# Patient Record
Sex: FEMALE | Race: WHITE | NOT HISPANIC OR LATINO | Employment: FULL TIME | URBAN - METROPOLITAN AREA
[De-identification: names, ages, dates, MRNs, and addresses within clinical notes are randomized per-mention and may not be internally consistent; named-entity substitution may affect disease eponyms.]

---

## 2017-05-18 ENCOUNTER — COMMUNICATION - HEALTHEAST (OUTPATIENT)
Dept: FAMILY MEDICINE | Facility: CLINIC | Age: 42
End: 2017-05-18

## 2018-05-25 ENCOUNTER — OFFICE VISIT - HEALTHEAST (OUTPATIENT)
Dept: FAMILY MEDICINE | Facility: CLINIC | Age: 43
End: 2018-05-25

## 2018-05-25 DIAGNOSIS — R21 RASH: ICD-10-CM

## 2018-05-25 DIAGNOSIS — R05.9 COUGH: ICD-10-CM

## 2018-05-25 DIAGNOSIS — J02.9 SORE THROAT: ICD-10-CM

## 2018-05-25 LAB — DEPRECATED S PYO AG THROAT QL EIA: NORMAL

## 2018-05-26 LAB — GROUP A STREP BY PCR: NORMAL

## 2018-08-14 ENCOUNTER — OFFICE VISIT - HEALTHEAST (OUTPATIENT)
Dept: SURGERY | Facility: CLINIC | Age: 43
End: 2018-08-14

## 2018-08-14 DIAGNOSIS — Z71.3 NUTRITIONAL COUNSELING: ICD-10-CM

## 2018-08-14 DIAGNOSIS — D25.2 FIBROIDS, SUBSEROUS: ICD-10-CM

## 2018-08-14 DIAGNOSIS — E66.9 OBESITY WITH BODY MASS INDEX OF 30.0-39.9: ICD-10-CM

## 2018-08-14 ASSESSMENT — MIFFLIN-ST. JEOR: SCORE: 1380.7

## 2018-10-16 ENCOUNTER — OFFICE VISIT - HEALTHEAST (OUTPATIENT)
Dept: FAMILY MEDICINE | Facility: CLINIC | Age: 43
End: 2018-10-16

## 2018-10-16 DIAGNOSIS — J02.9 VIRAL PHARYNGITIS: ICD-10-CM

## 2018-10-16 DIAGNOSIS — J02.9 SORE THROAT: ICD-10-CM

## 2018-10-16 DIAGNOSIS — B00.9 HERPES SIMPLEX VIRUS (HSV) INFECTION: ICD-10-CM

## 2018-10-16 LAB — DEPRECATED S PYO AG THROAT QL EIA: NORMAL

## 2018-10-17 LAB — GROUP A STREP BY PCR: NORMAL

## 2018-11-27 ENCOUNTER — OFFICE VISIT - HEALTHEAST (OUTPATIENT)
Dept: FAMILY MEDICINE | Facility: CLINIC | Age: 43
End: 2018-11-27

## 2018-11-27 ENCOUNTER — RECORDS - HEALTHEAST (OUTPATIENT)
Dept: GENERAL RADIOLOGY | Facility: CLINIC | Age: 43
End: 2018-11-27

## 2018-11-27 DIAGNOSIS — M79.641 PAIN OF RIGHT HAND: ICD-10-CM

## 2018-11-27 DIAGNOSIS — M79.641 PAIN IN RIGHT HAND: ICD-10-CM

## 2018-12-13 ENCOUNTER — OFFICE VISIT - HEALTHEAST (OUTPATIENT)
Dept: SURGERY | Facility: CLINIC | Age: 43
End: 2018-12-13

## 2018-12-13 DIAGNOSIS — E66.3 OVERWEIGHT (BMI 25.0-29.9): ICD-10-CM

## 2018-12-13 DIAGNOSIS — D25.2 FIBROIDS, SUBSEROUS: ICD-10-CM

## 2018-12-13 DIAGNOSIS — Z71.3 NUTRITIONAL COUNSELING: ICD-10-CM

## 2018-12-13 ASSESSMENT — MIFFLIN-ST. JEOR: SCORE: 1329.9

## 2019-02-25 ENCOUNTER — COMMUNICATION - HEALTHEAST (OUTPATIENT)
Dept: SCHEDULING | Facility: CLINIC | Age: 44
End: 2019-02-25

## 2019-04-01 ENCOUNTER — AMBULATORY - HEALTHEAST (OUTPATIENT)
Dept: SURGERY | Facility: CLINIC | Age: 44
End: 2019-04-01

## 2019-04-01 DIAGNOSIS — E66.9 OBESITY: ICD-10-CM

## 2019-04-01 DIAGNOSIS — R63.2 HYPERPHAGIA: ICD-10-CM

## 2019-04-04 ENCOUNTER — AMBULATORY - HEALTHEAST (OUTPATIENT)
Dept: SURGERY | Facility: CLINIC | Age: 44
End: 2019-04-04

## 2019-04-04 DIAGNOSIS — R63.2 HYPERPHAGIA: ICD-10-CM

## 2019-04-04 DIAGNOSIS — E66.9 OBESITY: ICD-10-CM

## 2019-04-04 DIAGNOSIS — B00.1 RECURRENT COLD SORES: ICD-10-CM

## 2019-05-17 ENCOUNTER — OFFICE VISIT - HEALTHEAST (OUTPATIENT)
Dept: FAMILY MEDICINE | Facility: CLINIC | Age: 44
End: 2019-05-17

## 2019-05-17 ENCOUNTER — COMMUNICATION - HEALTHEAST (OUTPATIENT)
Dept: FAMILY MEDICINE | Facility: CLINIC | Age: 44
End: 2019-05-17

## 2019-05-17 DIAGNOSIS — S99.912A ANKLE INJURY, LEFT, INITIAL ENCOUNTER: ICD-10-CM

## 2019-06-11 ENCOUNTER — AMBULATORY - HEALTHEAST (OUTPATIENT)
Dept: SURGERY | Facility: CLINIC | Age: 44
End: 2019-06-11

## 2019-06-11 ENCOUNTER — COMMUNICATION - HEALTHEAST (OUTPATIENT)
Dept: FAMILY MEDICINE | Facility: CLINIC | Age: 44
End: 2019-06-11

## 2019-06-11 DIAGNOSIS — G43.909 MIGRAINE: ICD-10-CM

## 2019-06-11 DIAGNOSIS — M54.2 NECK PAIN: ICD-10-CM

## 2019-06-11 DIAGNOSIS — B00.9 HERPES SIMPLEX VIRUS (HSV) INFECTION: ICD-10-CM

## 2019-06-26 ENCOUNTER — OFFICE VISIT - HEALTHEAST (OUTPATIENT)
Dept: FAMILY MEDICINE | Facility: CLINIC | Age: 44
End: 2019-06-26

## 2019-06-26 DIAGNOSIS — S99.912D INJURY OF LEFT ANKLE, SUBSEQUENT ENCOUNTER: ICD-10-CM

## 2019-06-26 ASSESSMENT — MIFFLIN-ST. JEOR: SCORE: 1313.57

## 2019-07-10 ENCOUNTER — RECORDS - HEALTHEAST (OUTPATIENT)
Dept: ADMINISTRATIVE | Facility: OTHER | Age: 44
End: 2019-07-10

## 2019-07-14 ENCOUNTER — OFFICE VISIT - HEALTHEAST (OUTPATIENT)
Dept: FAMILY MEDICINE | Facility: CLINIC | Age: 44
End: 2019-07-14

## 2019-07-14 DIAGNOSIS — R50.9 FEVER AND CHILLS: ICD-10-CM

## 2019-07-14 DIAGNOSIS — R52 BODY ACHES: ICD-10-CM

## 2019-07-14 DIAGNOSIS — R07.89 CHEST TIGHTNESS: ICD-10-CM

## 2019-07-14 DIAGNOSIS — J18.9 PNEUMONIA OF LEFT UPPER LOBE DUE TO INFECTIOUS ORGANISM: ICD-10-CM

## 2019-07-14 DIAGNOSIS — R05.9 COUGH: ICD-10-CM

## 2019-07-14 LAB
ALBUMIN SERPL-MCNC: 4.1 G/DL (ref 3.5–5)
ALBUMIN UR-MCNC: ABNORMAL MG/DL
ALP SERPL-CCNC: 64 U/L (ref 45–120)
ALT SERPL W P-5'-P-CCNC: 17 U/L (ref 0–45)
ANION GAP SERPL CALCULATED.3IONS-SCNC: 13 MMOL/L (ref 5–18)
APPEARANCE UR: CLEAR
AST SERPL W P-5'-P-CCNC: 19 U/L (ref 0–40)
BACTERIA #/AREA URNS HPF: ABNORMAL HPF
BASOPHILS # BLD AUTO: 0 THOU/UL (ref 0–0.2)
BASOPHILS NFR BLD AUTO: 1 % (ref 0–2)
BILIRUB SERPL-MCNC: 0.2 MG/DL (ref 0–1)
BILIRUB UR QL STRIP: NEGATIVE
BUN SERPL-MCNC: 8 MG/DL (ref 8–22)
C REACTIVE PROTEIN LHE: 7.2 MG/DL (ref 0–0.8)
CALCIUM SERPL-MCNC: 9.8 MG/DL (ref 8.5–10.5)
CHLORIDE BLD-SCNC: 106 MMOL/L (ref 98–107)
CO2 SERPL-SCNC: 22 MMOL/L (ref 22–31)
COLOR UR AUTO: YELLOW
CREAT SERPL-MCNC: 0.87 MG/DL (ref 0.6–1.1)
D DIMER PPP FEU-MCNC: 0.32 FEU UG/ML
EOSINOPHIL # BLD AUTO: 0.2 THOU/UL (ref 0–0.4)
EOSINOPHIL NFR BLD AUTO: 2 % (ref 0–6)
ERYTHROCYTE [DISTWIDTH] IN BLOOD BY AUTOMATED COUNT: 12.8 % (ref 11–14.5)
ERYTHROCYTE [SEDIMENTATION RATE] IN BLOOD BY WESTERGREN METHOD: 18 MM/HR (ref 0–20)
FLUAV AG SPEC QL IA: NORMAL
FLUBV AG SPEC QL IA: NORMAL
GFR SERPL CREATININE-BSD FRML MDRD: >60 ML/MIN/1.73M2
GLUCOSE BLD-MCNC: 90 MG/DL (ref 70–125)
GLUCOSE UR STRIP-MCNC: NEGATIVE MG/DL
HCT VFR BLD AUTO: 40.4 % (ref 35–47)
HGB BLD-MCNC: 13.6 G/DL (ref 12–16)
HGB UR QL STRIP: ABNORMAL
KETONES UR STRIP-MCNC: NEGATIVE MG/DL
LEUKOCYTE ESTERASE UR QL STRIP: NEGATIVE
LYMPHOCYTES # BLD AUTO: 1.6 THOU/UL (ref 0.8–4.4)
LYMPHOCYTES NFR BLD AUTO: 18 % (ref 20–40)
MCH RBC QN AUTO: 29.5 PG (ref 27–34)
MCHC RBC AUTO-ENTMCNC: 33.6 G/DL (ref 32–36)
MCV RBC AUTO: 88 FL (ref 80–100)
MONOCYTES # BLD AUTO: 0.8 THOU/UL (ref 0–0.9)
MONOCYTES NFR BLD AUTO: 9 % (ref 2–10)
MONOCYTES NFR BLD AUTO: NEGATIVE %
NEUTROPHILS # BLD AUTO: 6.3 THOU/UL (ref 2–7.7)
NEUTROPHILS NFR BLD AUTO: 71 % (ref 50–70)
NITRATE UR QL: NEGATIVE
PH UR STRIP: 5.5 [PH] (ref 5–8)
PLATELET # BLD AUTO: 285 THOU/UL (ref 140–440)
PMV BLD AUTO: 6.7 FL (ref 7–10)
POTASSIUM BLD-SCNC: 4.3 MMOL/L (ref 3.5–5)
PROT SERPL-MCNC: 7.2 G/DL (ref 6–8)
RBC # BLD AUTO: 4.6 MILL/UL (ref 3.8–5.4)
RBC #/AREA URNS AUTO: ABNORMAL HPF
SODIUM SERPL-SCNC: 141 MMOL/L (ref 136–145)
SP GR UR STRIP: 1.02 (ref 1–1.03)
SQUAMOUS #/AREA URNS AUTO: ABNORMAL LPF
UROBILINOGEN UR STRIP-ACNC: ABNORMAL
WBC #/AREA URNS AUTO: ABNORMAL HPF
WBC: 8.9 THOU/UL (ref 4–11)

## 2019-07-15 ENCOUNTER — COMMUNICATION - HEALTHEAST (OUTPATIENT)
Dept: FAMILY MEDICINE | Facility: CLINIC | Age: 44
End: 2019-07-15

## 2019-07-16 LAB — B BURGDOR IGG+IGM SER QL: 0.04 INDEX VALUE

## 2019-07-29 ENCOUNTER — RECORDS - HEALTHEAST (OUTPATIENT)
Dept: ADMINISTRATIVE | Facility: OTHER | Age: 44
End: 2019-07-29

## 2019-08-23 ENCOUNTER — OFFICE VISIT - HEALTHEAST (OUTPATIENT)
Dept: FAMILY MEDICINE | Facility: CLINIC | Age: 44
End: 2019-08-23

## 2019-08-23 DIAGNOSIS — L30.1 DYSHIDROTIC ECZEMA: ICD-10-CM

## 2019-08-23 DIAGNOSIS — B00.9 HERPES SIMPLEX VIRUS (HSV) INFECTION: ICD-10-CM

## 2019-08-23 DIAGNOSIS — T63.441D ANAPHYLACTIC REACTION TO BEE STING, ACCIDENTAL OR UNINTENTIONAL, SUBSEQUENT ENCOUNTER: ICD-10-CM

## 2019-08-23 DIAGNOSIS — Z00.00 ROUTINE GENERAL MEDICAL EXAMINATION AT A HEALTH CARE FACILITY: ICD-10-CM

## 2019-08-23 DIAGNOSIS — T78.2XXD ANAPHYLACTIC REACTION TO BEE STING, ACCIDENTAL OR UNINTENTIONAL, SUBSEQUENT ENCOUNTER: ICD-10-CM

## 2019-08-23 DIAGNOSIS — M54.2 NECK PAIN: ICD-10-CM

## 2019-08-23 DIAGNOSIS — G47.00 INSOMNIA, UNSPECIFIED TYPE: ICD-10-CM

## 2019-08-23 LAB
CHOLEST SERPL-MCNC: 173 MG/DL
FASTING STATUS PATIENT QL REPORTED: NO
HDLC SERPL-MCNC: 65 MG/DL
LDLC SERPL CALC-MCNC: 79 MG/DL
TRIGL SERPL-MCNC: 145 MG/DL

## 2019-08-23 ASSESSMENT — MIFFLIN-ST. JEOR: SCORE: 1308.12

## 2019-09-04 ENCOUNTER — RECORDS - HEALTHEAST (OUTPATIENT)
Dept: ADMINISTRATIVE | Facility: OTHER | Age: 44
End: 2019-09-04

## 2019-10-28 ENCOUNTER — RECORDS - HEALTHEAST (OUTPATIENT)
Dept: ADMINISTRATIVE | Facility: OTHER | Age: 44
End: 2019-10-28

## 2019-12-06 ENCOUNTER — RECORDS - HEALTHEAST (OUTPATIENT)
Dept: ADMINISTRATIVE | Facility: OTHER | Age: 44
End: 2019-12-06

## 2019-12-20 ENCOUNTER — RECORDS - HEALTHEAST (OUTPATIENT)
Dept: ADMINISTRATIVE | Facility: OTHER | Age: 44
End: 2019-12-20

## 2019-12-21 ENCOUNTER — AMBULATORY - HEALTHEAST (OUTPATIENT)
Dept: SURGERY | Facility: CLINIC | Age: 44
End: 2019-12-21

## 2019-12-21 DIAGNOSIS — E66.9 OBESITY: ICD-10-CM

## 2019-12-21 DIAGNOSIS — R63.2 HYPERPHAGIA: ICD-10-CM

## 2019-12-31 ENCOUNTER — OFFICE VISIT - HEALTHEAST (OUTPATIENT)
Dept: SURGERY | Facility: CLINIC | Age: 44
End: 2019-12-31

## 2019-12-31 DIAGNOSIS — E66.3 OVERWEIGHT (BMI 25.0-29.9): ICD-10-CM

## 2019-12-31 DIAGNOSIS — Z71.3 NUTRITIONAL COUNSELING: ICD-10-CM

## 2019-12-31 DIAGNOSIS — D25.2 FIBROIDS, SUBSEROUS: ICD-10-CM

## 2019-12-31 ASSESSMENT — MIFFLIN-ST. JEOR: SCORE: 1354.39

## 2020-01-27 ENCOUNTER — RECORDS - HEALTHEAST (OUTPATIENT)
Dept: ADMINISTRATIVE | Facility: OTHER | Age: 45
End: 2020-01-27

## 2020-02-03 ENCOUNTER — TRANSFERRED RECORDS (OUTPATIENT)
Dept: HEALTH INFORMATION MANAGEMENT | Facility: CLINIC | Age: 45
End: 2020-02-03

## 2020-02-10 ENCOUNTER — RECORDS - HEALTHEAST (OUTPATIENT)
Dept: ADMINISTRATIVE | Facility: OTHER | Age: 45
End: 2020-02-10

## 2020-05-04 ENCOUNTER — AMBULATORY - HEALTHEAST (OUTPATIENT)
Dept: SURGERY | Facility: CLINIC | Age: 45
End: 2020-05-04

## 2020-05-04 DIAGNOSIS — K12.0 RECURRENT ORAL APHTHAE: ICD-10-CM

## 2020-05-04 DIAGNOSIS — M54.2 NECK PAIN: ICD-10-CM

## 2020-10-13 ENCOUNTER — AMBULATORY - HEALTHEAST (OUTPATIENT)
Dept: NURSING | Facility: CLINIC | Age: 45
End: 2020-10-13

## 2020-11-06 ENCOUNTER — TRANSFERRED RECORDS (OUTPATIENT)
Dept: HEALTH INFORMATION MANAGEMENT | Facility: CLINIC | Age: 45
End: 2020-11-06

## 2020-11-22 ENCOUNTER — AMBULATORY - HEALTHEAST (OUTPATIENT)
Dept: SURGERY | Facility: CLINIC | Age: 45
End: 2020-11-22

## 2020-11-22 DIAGNOSIS — R63.2 HYPERPHAGIA: ICD-10-CM

## 2020-11-22 DIAGNOSIS — E66.9 OBESITY: ICD-10-CM

## 2021-01-29 ENCOUNTER — OFFICE VISIT - HEALTHEAST (OUTPATIENT)
Dept: FAMILY MEDICINE | Facility: CLINIC | Age: 46
End: 2021-01-29

## 2021-01-29 DIAGNOSIS — Z12.11 SCREENING FOR COLON CANCER: ICD-10-CM

## 2021-01-29 DIAGNOSIS — Z12.31 VISIT FOR SCREENING MAMMOGRAM: ICD-10-CM

## 2021-01-29 DIAGNOSIS — M25.572 PAIN IN JOINT, ANKLE AND FOOT, LEFT: ICD-10-CM

## 2021-01-29 DIAGNOSIS — Z00.00 ROUTINE GENERAL MEDICAL EXAMINATION AT A HEALTH CARE FACILITY: ICD-10-CM

## 2021-01-29 DIAGNOSIS — Z80.0 FAMILY HISTORY OF COLON CANCER: ICD-10-CM

## 2021-01-29 DIAGNOSIS — Z86.19 HISTORY OF COLD SORES: ICD-10-CM

## 2021-01-29 DIAGNOSIS — M54.2 NECK PAIN: ICD-10-CM

## 2021-01-29 LAB
CHOLEST SERPL-MCNC: 191 MG/DL
FASTING STATUS PATIENT QL REPORTED: NORMAL
FASTING STATUS PATIENT QL REPORTED: NORMAL
GLUCOSE BLD-MCNC: 84 MG/DL (ref 74–125)
HDLC SERPL-MCNC: 61 MG/DL
HGB BLD-MCNC: 14.4 G/DL (ref 12–16)
LDLC SERPL CALC-MCNC: 110 MG/DL
TRIGL SERPL-MCNC: 98 MG/DL

## 2021-01-29 ASSESSMENT — MIFFLIN-ST. JEOR: SCORE: 1307.44

## 2021-02-02 ENCOUNTER — OFFICE VISIT - HEALTHEAST (OUTPATIENT)
Dept: FAMILY MEDICINE | Facility: CLINIC | Age: 46
End: 2021-02-02

## 2021-02-02 ENCOUNTER — COMMUNICATION - HEALTHEAST (OUTPATIENT)
Dept: SCHEDULING | Facility: CLINIC | Age: 46
End: 2021-02-02

## 2021-02-02 DIAGNOSIS — M54.12 RIGHT CERVICAL RADICULOPATHY: ICD-10-CM

## 2021-02-02 ASSESSMENT — MIFFLIN-ST. JEOR: SCORE: 1312.21

## 2021-02-03 ENCOUNTER — COMMUNICATION - HEALTHEAST (OUTPATIENT)
Dept: PHYSICAL MEDICINE AND REHAB | Facility: CLINIC | Age: 46
End: 2021-02-03

## 2021-02-03 ENCOUNTER — OFFICE VISIT - HEALTHEAST (OUTPATIENT)
Dept: NEUROSURGERY | Facility: CLINIC | Age: 46
End: 2021-02-03

## 2021-02-03 ENCOUNTER — COMMUNICATION - HEALTHEAST (OUTPATIENT)
Dept: SCHEDULING | Facility: CLINIC | Age: 46
End: 2021-02-03

## 2021-02-03 ENCOUNTER — HOSPITAL ENCOUNTER (OUTPATIENT)
Dept: PHYSICAL MEDICINE AND REHAB | Facility: CLINIC | Age: 46
Discharge: HOME | End: 2021-02-03
Attending: EMERGENCY MEDICINE

## 2021-02-03 DIAGNOSIS — M54.12 CERVICAL RADICULITIS: ICD-10-CM

## 2021-02-03 DIAGNOSIS — R52 SEVERE PAIN: ICD-10-CM

## 2021-02-03 DIAGNOSIS — M50.20 CERVICAL DISC HERNIATION: ICD-10-CM

## 2021-02-03 DIAGNOSIS — M54.12 CERVICAL RADICULOPATHY: ICD-10-CM

## 2021-02-03 ASSESSMENT — MIFFLIN-ST. JEOR: SCORE: 1312.21

## 2021-02-04 ENCOUNTER — COMMUNICATION - HEALTHEAST (OUTPATIENT)
Dept: PHYSICAL MEDICINE AND REHAB | Facility: CLINIC | Age: 46
End: 2021-02-04

## 2021-02-04 ENCOUNTER — HOSPITAL ENCOUNTER (OUTPATIENT)
Dept: PHYSICAL MEDICINE AND REHAB | Facility: CLINIC | Age: 46
Discharge: HOME | End: 2021-02-04
Attending: PAIN MEDICINE

## 2021-02-04 DIAGNOSIS — R52 SEVERE PAIN: ICD-10-CM

## 2021-02-04 DIAGNOSIS — M54.12 CERVICAL RADICULITIS: ICD-10-CM

## 2021-02-04 DIAGNOSIS — M50.20 CERVICAL DISC HERNIATION: ICD-10-CM

## 2021-02-06 ENCOUNTER — COMMUNICATION - HEALTHEAST (OUTPATIENT)
Dept: NEUROLOGY | Facility: CLINIC | Age: 46
End: 2021-02-06

## 2021-02-06 DIAGNOSIS — M54.12 CERVICAL RADICULOPATHY: ICD-10-CM

## 2021-02-08 ENCOUNTER — COMMUNICATION - HEALTHEAST (OUTPATIENT)
Dept: NEUROSURGERY | Facility: CLINIC | Age: 46
End: 2021-02-08

## 2021-02-10 ENCOUNTER — OFFICE VISIT - HEALTHEAST (OUTPATIENT)
Dept: NEUROSURGERY | Facility: CLINIC | Age: 46
End: 2021-02-10

## 2021-02-10 ENCOUNTER — HOSPITAL ENCOUNTER (OUTPATIENT)
Dept: RADIOLOGY | Facility: HOSPITAL | Age: 46
Discharge: HOME | End: 2021-02-10
Attending: SURGERY

## 2021-02-10 ENCOUNTER — RECORDS - HEALTHEAST (OUTPATIENT)
Dept: ADMINISTRATIVE | Facility: OTHER | Age: 46
End: 2021-02-10

## 2021-02-10 ENCOUNTER — SURGERY - HEALTHEAST (OUTPATIENT)
Dept: NEUROSURGERY | Facility: CLINIC | Age: 46
End: 2021-02-10

## 2021-02-10 ENCOUNTER — AMBULATORY - HEALTHEAST (OUTPATIENT)
Dept: SURGERY | Facility: HOSPITAL | Age: 46
End: 2021-02-10

## 2021-02-10 ENCOUNTER — AMBULATORY - HEALTHEAST (OUTPATIENT)
Dept: LAB | Facility: HOSPITAL | Age: 46
End: 2021-02-10

## 2021-02-10 DIAGNOSIS — Z11.59 ENCOUNTER FOR SCREENING FOR OTHER VIRAL DISEASES: ICD-10-CM

## 2021-02-10 DIAGNOSIS — M54.12 CERVICAL RADICULOPATHY: ICD-10-CM

## 2021-02-10 LAB
ANION GAP SERPL CALCULATED.3IONS-SCNC: 9 MMOL/L (ref 5–18)
APTT PPP: 30 SECONDS (ref 24–37)
BUN SERPL-MCNC: 13 MG/DL (ref 8–22)
CALCIUM SERPL-MCNC: 9.1 MG/DL (ref 8.5–10.5)
CHLORIDE BLD-SCNC: 107 MMOL/L (ref 98–107)
CLOSURE TME COLL+EPINEP BLD: 139 SEC (ref 1–180)
CO2 SERPL-SCNC: 22 MMOL/L (ref 22–31)
CREAT SERPL-MCNC: 0.74 MG/DL (ref 0.6–1.1)
ERYTHROCYTE [DISTWIDTH] IN BLOOD BY AUTOMATED COUNT: 12.8 % (ref 11–14.5)
GFR SERPL CREATININE-BSD FRML MDRD: >60 ML/MIN/1.73M2
GLUCOSE BLD-MCNC: 96 MG/DL (ref 70–125)
HCT VFR BLD AUTO: 39.9 % (ref 35–47)
HGB BLD-MCNC: 13.2 G/DL (ref 12–16)
INR PPP: 0.85 (ref 0.9–1.1)
MCH RBC QN AUTO: 30.1 PG (ref 27–34)
MCHC RBC AUTO-ENTMCNC: 33.1 G/DL (ref 32–36)
MCV RBC AUTO: 91 FL (ref 80–100)
PLATELET # BLD AUTO: 330 THOU/UL (ref 140–440)
PMV BLD AUTO: 8.5 FL (ref 8.5–12.5)
POTASSIUM BLD-SCNC: 4 MMOL/L (ref 3.5–5)
RBC # BLD AUTO: 4.38 MILL/UL (ref 3.8–5.4)
SODIUM SERPL-SCNC: 138 MMOL/L (ref 136–145)
WBC: 10.2 THOU/UL (ref 4–11)

## 2021-02-10 ASSESSMENT — MIFFLIN-ST. JEOR: SCORE: 1312.21

## 2021-02-11 ENCOUNTER — COMMUNICATION - HEALTHEAST (OUTPATIENT)
Dept: NEUROSURGERY | Facility: CLINIC | Age: 46
End: 2021-02-11

## 2021-02-11 ENCOUNTER — AMBULATORY - HEALTHEAST (OUTPATIENT)
Dept: LAB | Facility: CLINIC | Age: 46
End: 2021-02-11

## 2021-02-11 ENCOUNTER — OFFICE VISIT - HEALTHEAST (OUTPATIENT)
Dept: FAMILY MEDICINE | Facility: CLINIC | Age: 46
End: 2021-02-11

## 2021-02-11 DIAGNOSIS — Z11.59 ENCOUNTER FOR SCREENING FOR OTHER VIRAL DISEASES: ICD-10-CM

## 2021-02-11 DIAGNOSIS — Z01.818 PREOP GENERAL PHYSICAL EXAM: ICD-10-CM

## 2021-02-11 DIAGNOSIS — M54.12 CERVICAL RADICULOPATHY: ICD-10-CM

## 2021-02-11 DIAGNOSIS — E04.1 THYROID CYST: ICD-10-CM

## 2021-02-11 ASSESSMENT — MIFFLIN-ST. JEOR: SCORE: 1312.21

## 2021-02-12 LAB
SARS-COV-2 PCR COMMENT: NORMAL
SARS-COV-2 RNA SPEC QL NAA+PROBE: NEGATIVE
SARS-COV-2 VIRUS SPECIMEN SOURCE: NORMAL

## 2021-02-13 ENCOUNTER — COMMUNICATION - HEALTHEAST (OUTPATIENT)
Dept: SCHEDULING | Facility: CLINIC | Age: 46
End: 2021-02-13

## 2021-02-15 ENCOUNTER — ANESTHESIA - HEALTHEAST (OUTPATIENT)
Dept: SURGERY | Facility: HOSPITAL | Age: 46
End: 2021-02-15

## 2021-02-15 ENCOUNTER — SURGERY - HEALTHEAST (OUTPATIENT)
Dept: SURGERY | Facility: HOSPITAL | Age: 46
End: 2021-02-15

## 2021-02-15 ASSESSMENT — MIFFLIN-ST. JEOR
SCORE: 1312.21
SCORE: 1362.1

## 2021-02-16 ENCOUNTER — COMMUNICATION - HEALTHEAST (OUTPATIENT)
Dept: NEUROSURGERY | Facility: CLINIC | Age: 46
End: 2021-02-16

## 2021-02-17 ENCOUNTER — COMMUNICATION - HEALTHEAST (OUTPATIENT)
Dept: NEUROSURGERY | Facility: CLINIC | Age: 46
End: 2021-02-17

## 2021-02-17 DIAGNOSIS — Z98.1 S/P CERVICAL SPINAL FUSION: ICD-10-CM

## 2021-02-22 ENCOUNTER — RECORDS - HEALTHEAST (OUTPATIENT)
Dept: ADMINISTRATIVE | Facility: OTHER | Age: 46
End: 2021-02-22

## 2021-02-22 ENCOUNTER — AMBULATORY - HEALTHEAST (OUTPATIENT)
Dept: NEUROSURGERY | Facility: CLINIC | Age: 46
End: 2021-02-22

## 2021-02-22 ENCOUNTER — COMMUNICATION - HEALTHEAST (OUTPATIENT)
Dept: NEUROSURGERY | Facility: CLINIC | Age: 46
End: 2021-02-22

## 2021-02-22 DIAGNOSIS — M54.2 NECK PAIN: ICD-10-CM

## 2021-02-22 DIAGNOSIS — Z98.1 S/P CERVICAL SPINAL FUSION: ICD-10-CM

## 2021-02-23 ENCOUNTER — COMMUNICATION - HEALTHEAST (OUTPATIENT)
Dept: NEUROSURGERY | Facility: CLINIC | Age: 46
End: 2021-02-23

## 2021-03-01 ENCOUNTER — COMMUNICATION - HEALTHEAST (OUTPATIENT)
Dept: NEUROSURGERY | Facility: CLINIC | Age: 46
End: 2021-03-01

## 2021-03-01 ENCOUNTER — RECORDS - HEALTHEAST (OUTPATIENT)
Dept: ADMINISTRATIVE | Facility: OTHER | Age: 46
End: 2021-03-01

## 2021-03-01 DIAGNOSIS — M54.2 NECK PAIN: ICD-10-CM

## 2021-03-02 ENCOUNTER — COMMUNICATION - HEALTHEAST (OUTPATIENT)
Dept: NEUROSURGERY | Facility: CLINIC | Age: 46
End: 2021-03-02

## 2021-03-03 ENCOUNTER — RECORDS - HEALTHEAST (OUTPATIENT)
Dept: ADMINISTRATIVE | Facility: OTHER | Age: 46
End: 2021-03-03

## 2021-03-03 ENCOUNTER — AMBULATORY - HEALTHEAST (OUTPATIENT)
Dept: FAMILY MEDICINE | Facility: CLINIC | Age: 46
End: 2021-03-03

## 2021-03-03 ENCOUNTER — HOSPITAL ENCOUNTER (OUTPATIENT)
Dept: ULTRASOUND IMAGING | Facility: CLINIC | Age: 46
Discharge: HOME | End: 2021-03-03
Attending: FAMILY MEDICINE

## 2021-03-03 DIAGNOSIS — E04.1 THYROID NODULE: ICD-10-CM

## 2021-03-03 DIAGNOSIS — M54.12 CERVICAL RADICULOPATHY: ICD-10-CM

## 2021-03-03 DIAGNOSIS — E04.1 THYROID CYST: ICD-10-CM

## 2021-03-04 ENCOUNTER — AMBULATORY - HEALTHEAST (OUTPATIENT)
Dept: FAMILY MEDICINE | Facility: CLINIC | Age: 46
End: 2021-03-04

## 2021-03-04 DIAGNOSIS — Z11.59 ENCOUNTER FOR SCREENING FOR OTHER VIRAL DISEASES: ICD-10-CM

## 2021-03-05 ENCOUNTER — COMMUNICATION - HEALTHEAST (OUTPATIENT)
Dept: NEUROSURGERY | Facility: CLINIC | Age: 46
End: 2021-03-05

## 2021-03-05 DIAGNOSIS — M54.12 CERVICAL RADICULOPATHY: ICD-10-CM

## 2021-03-05 DIAGNOSIS — Z98.1 S/P CERVICAL SPINAL FUSION: ICD-10-CM

## 2021-03-19 ENCOUNTER — RECORDS - HEALTHEAST (OUTPATIENT)
Dept: ADMINISTRATIVE | Facility: OTHER | Age: 46
End: 2021-03-19

## 2021-03-21 ENCOUNTER — AMBULATORY - HEALTHEAST (OUTPATIENT)
Dept: FAMILY MEDICINE | Facility: CLINIC | Age: 46
End: 2021-03-21

## 2021-03-21 DIAGNOSIS — Z11.59 ENCOUNTER FOR SCREENING FOR OTHER VIRAL DISEASES: ICD-10-CM

## 2021-03-23 ENCOUNTER — COMMUNICATION - HEALTHEAST (OUTPATIENT)
Dept: SCHEDULING | Facility: CLINIC | Age: 46
End: 2021-03-23

## 2021-03-23 ENCOUNTER — COMMUNICATION - HEALTHEAST (OUTPATIENT)
Dept: ADMINISTRATIVE | Facility: CLINIC | Age: 46
End: 2021-03-23

## 2021-03-23 DIAGNOSIS — M48.02 CERVICAL STENOSIS OF SPINE: ICD-10-CM

## 2021-03-23 DIAGNOSIS — M54.12 CERVICAL RADICULOPATHY: ICD-10-CM

## 2021-03-25 ENCOUNTER — HOSPITAL ENCOUNTER (OUTPATIENT)
Dept: ULTRASOUND IMAGING | Facility: CLINIC | Age: 46
Discharge: HOME | End: 2021-03-25
Attending: FAMILY MEDICINE | Admitting: RADIOLOGY

## 2021-03-25 DIAGNOSIS — E04.1 THYROID NODULE: ICD-10-CM

## 2021-03-29 ENCOUNTER — COMMUNICATION - HEALTHEAST (OUTPATIENT)
Dept: FAMILY MEDICINE | Facility: CLINIC | Age: 46
End: 2021-03-29

## 2021-03-29 LAB
CAP COMMENT: NORMAL
LAB AP CHARGES (HE HISTORICAL CONVERSION): NORMAL
LAB AP INITIAL CYTO EVAL (HE HISTORICAL CONVERSION): NORMAL
LAB MED GENERAL PATH INTERP (HE HISTORICAL CONVERSION): NORMAL
PATH REPORT.COMMENTS IMP SPEC: NORMAL
PATH REPORT.COMMENTS IMP SPEC: NORMAL
PATH REPORT.FINAL DX SPEC: NORMAL
PATH REPORT.RELEVANT HX SPEC: NORMAL
SPECIMEN DESCRIPTION: NORMAL

## 2021-03-30 ENCOUNTER — OFFICE VISIT - HEALTHEAST (OUTPATIENT)
Dept: NEUROSURGERY | Facility: CLINIC | Age: 46
End: 2021-03-30

## 2021-03-30 ENCOUNTER — RECORDS - HEALTHEAST (OUTPATIENT)
Dept: GENERAL RADIOLOGY | Facility: CLINIC | Age: 46
End: 2021-03-30

## 2021-03-30 DIAGNOSIS — Z98.1 ARTHRODESIS STATUS: ICD-10-CM

## 2021-03-30 DIAGNOSIS — M54.12 CERVICAL RADICULOPATHY: ICD-10-CM

## 2021-03-30 RX ORDER — METHOCARBAMOL 500 MG/1
500 TABLET, FILM COATED ORAL 3 TIMES DAILY
Qty: 56 TABLET | Refills: 0 | Status: SHIPPED | OUTPATIENT
Start: 2021-03-30 | End: 2021-09-10

## 2021-03-31 ENCOUNTER — COMMUNICATION - HEALTHEAST (OUTPATIENT)
Dept: NEUROSURGERY | Facility: CLINIC | Age: 46
End: 2021-03-31

## 2021-04-02 ENCOUNTER — RECORDS - HEALTHEAST (OUTPATIENT)
Dept: ADMINISTRATIVE | Facility: OTHER | Age: 46
End: 2021-04-02

## 2021-04-04 ENCOUNTER — COMMUNICATION - HEALTHEAST (OUTPATIENT)
Dept: NEUROSURGERY | Facility: CLINIC | Age: 46
End: 2021-04-04

## 2021-04-09 ENCOUNTER — COMMUNICATION - HEALTHEAST (OUTPATIENT)
Dept: NEUROSURGERY | Facility: CLINIC | Age: 46
End: 2021-04-09

## 2021-04-23 ENCOUNTER — HOSPITAL ENCOUNTER (OUTPATIENT)
Dept: MAMMOGRAPHY | Facility: CLINIC | Age: 46
Discharge: HOME | End: 2021-04-23
Attending: FAMILY MEDICINE

## 2021-04-23 DIAGNOSIS — Z00.00 ROUTINE GENERAL MEDICAL EXAMINATION AT A HEALTH CARE FACILITY: ICD-10-CM

## 2021-04-23 DIAGNOSIS — Z12.31 VISIT FOR SCREENING MAMMOGRAM: ICD-10-CM

## 2021-05-11 ENCOUNTER — COMMUNICATION - HEALTHEAST (OUTPATIENT)
Dept: SURGERY | Facility: CLINIC | Age: 46
End: 2021-05-11

## 2021-05-11 ENCOUNTER — RECORDS - HEALTHEAST (OUTPATIENT)
Dept: GENERAL RADIOLOGY | Facility: CLINIC | Age: 46
End: 2021-05-11

## 2021-05-11 ENCOUNTER — OFFICE VISIT - HEALTHEAST (OUTPATIENT)
Dept: NEUROSURGERY | Facility: CLINIC | Age: 46
End: 2021-05-11

## 2021-05-11 DIAGNOSIS — M54.12 CERVICAL RADICULOPATHY: ICD-10-CM

## 2021-05-11 DIAGNOSIS — Z98.1 S/P CERVICAL SPINAL FUSION: ICD-10-CM

## 2021-05-11 DIAGNOSIS — M54.12 RADICULOPATHY, CERVICAL REGION: ICD-10-CM

## 2021-05-11 DIAGNOSIS — Z86.19 HISTORY OF COLD SORES: ICD-10-CM

## 2021-05-11 RX ORDER — VALACYCLOVIR HYDROCHLORIDE 500 MG/1
TABLET, FILM COATED ORAL
Qty: 60 TABLET | Refills: 0 | Status: SHIPPED | OUTPATIENT
Start: 2021-05-11 | End: 2022-02-04

## 2021-05-11 RX ORDER — CYCLOBENZAPRINE HCL 10 MG
5-10 TABLET ORAL
Qty: 30 TABLET | Refills: 0 | Status: SHIPPED | OUTPATIENT
Start: 2021-05-11 | End: 2021-09-24

## 2021-05-11 ASSESSMENT — MIFFLIN-ST. JEOR: SCORE: 1362.1

## 2021-05-25 ENCOUNTER — RECORDS - HEALTHEAST (OUTPATIENT)
Dept: ADMINISTRATIVE | Facility: CLINIC | Age: 46
End: 2021-05-25

## 2021-05-26 ENCOUNTER — RECORDS - HEALTHEAST (OUTPATIENT)
Dept: ADMINISTRATIVE | Facility: CLINIC | Age: 46
End: 2021-05-26

## 2021-05-26 ENCOUNTER — RECORDS - HEALTHEAST (OUTPATIENT)
Dept: ADMINISTRATIVE | Facility: OTHER | Age: 46
End: 2021-05-26

## 2021-05-27 VITALS
HEART RATE: 96 BPM | BODY MASS INDEX: 29.41 KG/M2 | SYSTOLIC BLOOD PRESSURE: 126 MMHG | WEIGHT: 166 LBS | OXYGEN SATURATION: 97 % | HEIGHT: 63 IN | RESPIRATION RATE: 16 BRPM | DIASTOLIC BLOOD PRESSURE: 74 MMHG

## 2021-05-27 VITALS — SYSTOLIC BLOOD PRESSURE: 112 MMHG | HEART RATE: 68 BPM | RESPIRATION RATE: 18 BRPM | DIASTOLIC BLOOD PRESSURE: 72 MMHG

## 2021-05-27 VITALS — DIASTOLIC BLOOD PRESSURE: 84 MMHG | SYSTOLIC BLOOD PRESSURE: 123 MMHG | HEART RATE: 78 BPM | OXYGEN SATURATION: 98 %

## 2021-05-28 NOTE — PROGRESS NOTES
ASSESSMENT:  1. Ankle injury, left, initial encounter  X-ray negative for fracture, signs and symptoms consistent with an acute ankle sprain.  - XR Ankle Left 3 or More VWS        PLAN:  1.  X-ray of the left ankle discussed with the patient.  2.  Conservative measures, patient to be fitted with an ankle brace.  3.  Given her prior surgical history, if she is not experiencing significant improvement within the next 7 to 10 days or so then I think it is warranted that she see either her prior surgeon or a podiatrist.       Orders Placed This Encounter   Procedures     XR Ankle Left 3 or More VWS     Order Specific Question:   Reason for Exam (Describe Symptoms):     Answer:   Patient with prior left ankle surgery, twisted ankle yesterday, lateral ankle swelling     Order Specific Question:   Is the patient pregnant?     Answer:   No     Order Specific Question:   Can the procedure be changed per Radiologist protocol?     Answer:   Yes     Medications Discontinued During This Encounter   Medication Reason     phentermine 37.5 MG capsule Therapy completed       Return in about 1 week (around 5/24/2019) for w/ PCP if symptoms persist or worsen.    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 She has had problems in the past with this ankle        SUBJECTIVE:  Nora is a 44 y.o. female who presents with left ankle pain. 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 Her left ankle is swollen and she is using crutches to get around. She iced her ankle last night. She is able to bear some weight but it is painful. She notes that she had surgery to repair ligaments in her left ankle 4-6 years ago which was successful.     REVIEW OF SYSTEMS:   Patient complains of left ankle pain.  All other systems are negative.    PFSH:  Immunization History   Administered Date(s) Administered      DT (pediatric) 06/06/2000     Hep A, historic 12/14/2000, 08/13/2007     Influenza, Seasonal, Inj PF IIV3 10/10/2014     Influenza, seasonal,quad inj 36+ mos 10/14/2016     Influenza,seasonal quad, PF, 36+MOS 10/28/2015     Td,adult,historic,unspecified 06/26/2006     Tdap 10/14/2016     Social History     Socioeconomic History     Marital status: Single     Spouse name: Not on file     Number of children: 2     Years of education: Not on file     Highest education level: Not on file   Occupational History     Occupation:    Social Needs     Financial resource strain: Not on file     Food insecurity:     Worry: Not on file     Inability: Not on file     Transportation needs:     Medical: Not on file     Non-medical: Not on file   Tobacco Use     Smoking status: Former Smoker     Smokeless tobacco: Never Used   Substance and Sexual Activity     Alcohol use: No     Drug use: No     Sexual activity: Yes     Partners: Male   Lifestyle     Physical activity:     Days per week: Not on file     Minutes per session: Not on file     Stress: Not on file   Relationships     Social connections:     Talks on phone: Not on file     Gets together: Not on file     Attends Mosque service: Not on file     Active member of club or organization: Not on file     Attends meetings of clubs or organizations: Not on file     Relationship status: Not on file     Intimate partner violence:     Fear of current or ex partner: Not on file     Emotionally abused: Not on file     Physically abused: Not on file     Forced sexual activity: Not on file   Other Topics Concern     Not on file   Social History Narrative    Lives with family.      Past Medical History:   Diagnosis Date     Fibroids      No family history on file.    MEDICATIONS:  Current Outpatient Medications   Medication Sig Dispense Refill     aspirin-acetaminophen-caffeine (EXCEDRIN MIGRAINE) 250-250-65 mg per tablet Take 1 tablet by mouth every 6 (six) hours as  needed for pain.       cyclobenzaprine (FLEXERIL) 5 MG tablet 1 tablet at bedtime as needed for neck pain 30 tablet 0     EPINEPHrine (EPIPEN) 0.3 mg/0.3 mL (1:1,000) atIn Inject into the shoulder, thigh, or buttocks once.       hydrocortisone 2.5 % cream Use qd to bid to eczema prn 30 g 1     phentermine (ADIPEX-P) 37.5 mg tablet Take 1/2 to 1 tablet in the morning. 90 tablet 1     No current facility-administered medications for this visit.        TOBACCO USE:  Social History     Tobacco Use   Smoking Status Former Smoker   Smokeless Tobacco Never Used       VITALS:  Vitals:    05/17/19 0846   BP: 110/74   Pulse: 86   SpO2: 97%   Weight: 159 lb 12.8 oz (72.5 kg)     Wt Readings from Last 3 Encounters:   05/17/19 159 lb 12.8 oz (72.5 kg)   12/13/18 158 lb 14.4 oz (72.1 kg)   11/27/18 162 lb (73.5 kg)       PHYSICAL EXAM:  Constitutional:   Reveals a healthy appearing female.  Vitals: per nursing notes.  HEENT:  Ears:  External canals, TMs clear.    Eyes:  EOMs full, PERRL.  Lungs: Clear to A&P without rales or wheezes.  Respiratory effort normal.  Cardiac:   Regular rate and rhythm, normal S1, S2, no murmur or gallop.  Musculoskeletal: Lateral left ankle: Diffusely swollen and tender, tendinitis over the left achilles, patients ROM is guarded but still flexible.   Neuro:  Alert and oriented. Cranial nerves, motor, sensory exams are intact.  No gross focal deficits.  Psychiatric:  Memory intact, mood appropriate.    QUALITY MEASURES:      DATA REVIEWED:  Additional History from Old Records Summarized (2):   Decision to Obtain Records (1):   Radiology Tests Summarized or Ordered (1): Ordered X-RAY of left ankle.  Labs Reviewed or Ordered (1):   Medicine Test Summarized or Ordered (1):   Independent Review of EKG, X-RAY, or RAPID STREP (2 each): Reviewed X-RAY of left ankle: Preliminary interpretation is negative for fracture.    The visit lasted a total of 14 minutes face to face with the patient. Over 50% of the  time was spent counseling and educating the patient about her ankle pain.    By signing my name below, I, Cristofer Gates, attest that this documentation has been prepared under the direction and in the presence of Dr. Jony Horne.  Electronic Signature: Jazmín Alvarado. 5/17/2019 8:45 AM.    I, Dr. Horne, personally performed the services described in this documentation. All medical record entries made by the scribe were at my direction and in my presence. I have reviewed the chart and discharge instructions (if applicable) and agree that the record reflects my personal performance and is accurate and complete.      Total data points: 3

## 2021-05-28 NOTE — PATIENT INSTRUCTIONS - HE
If within 7 to 10 days you are not experiencing significant improvement with the left ankle, then I do think you should see your prior surgeon or we can refer if needed.

## 2021-05-29 ENCOUNTER — RECORDS - HEALTHEAST (OUTPATIENT)
Dept: ADMINISTRATIVE | Facility: CLINIC | Age: 46
End: 2021-05-29

## 2021-05-29 NOTE — TELEPHONE ENCOUNTER
RN cannot approve Refill Request    RN can NOT refill this medication overdue for office visits and/or labs.    Larry Campo, [facility] Triage/Med Refill 6/12/2019    Requested Prescriptions   Pending Prescriptions Disp Refills     valACYclovir (VALTREX) 500 MG tablet [Pharmacy Med Name: VALACYCLOVIR 500MG TABLETS] 12 tablet 0     Sig: TAKE 1 TABLET(500 MG) BY MOUTH TWICE DAILY FOR 3 DAYS       Antivirals Refill Protocol Failed - 6/11/2019  1:25 AM        Failed - Renal function done in last year     Creatinine   Date Value Ref Range Status   12/28/2016 0.79 0.60 - 1.10 mg/dL Final             Passed - Visit with PCP or prescribing provider visit in past 12 months or next 3 months     Last office visit with prescriber/PCP: 10/16/2018 Abby Denis NP OR same dept: 5/17/2019 Jony Horne MD OR same specialty: 5/17/2019 Jony Horne MD  Last physical: Visit date not found Last MTM visit: Visit date not found   Next visit within 3 mo: Visit date not found  Next physical within 3 mo: Visit date not found  Prescriber OR PCP: Abby Denis NP  Last diagnosis associated with med order: 1. Herpes Simplex Type I  - valACYclovir (VALTREX) 500 MG tablet [Pharmacy Med Name: VALACYCLOVIR 500MG TABLETS]; TAKE 1 TABLET(500 MG) BY MOUTH TWICE DAILY FOR 3 DAYS  Dispense: 12 tablet; Refill: 0    If protocol passes may refill for 12 months if within 3 months of last provider visit (or a total of 15 months).             Passed - Patient does not have active pregnancy episode        Passed - Patient has not had positive pregnancy test in last 280 days     Pregnancy Test, Urine   Date Value Ref Range Status   10/19/2015 Negative Negative Final

## 2021-05-30 ENCOUNTER — RECORDS - HEALTHEAST (OUTPATIENT)
Dept: ADMINISTRATIVE | Facility: CLINIC | Age: 46
End: 2021-05-30

## 2021-05-30 NOTE — PROGRESS NOTES
Walk In Care Note                                                                                 Date of Visit: 7/14/2019     Chief Complaint   Nora Jay is a(n) 44 y.o. Other female who presents to Walk In Care with the following complaint(s):  Illness (since 7/1/19, started to not feel good, was told had an infected tooth 7/8 tooth was taken out and try to get infection out, has been getting worse since then, fever 102-103, feeling pressure in chest and feels like needs to breath in shallow, body aches, still has pain in face and neck taking tylenol and ibuprofen)       Assessment and Plan   1. Pneumonia of left upper lobe due to infectious organism (H)  - doxycycline (MONODOX) 100 MG capsule; Take 1 capsule (100 mg total) by mouth 2 (two) times a day for 10 days.  Dispense: 20 capsule; Refill: 0    2. Fever and chills  - HM1(CBC and Differential)  - Comprehensive Metabolic Panel  - D-dimer, Quantitative  - Urinalysis-UC if Indicated  - XR Chest 2 Views  - C-Reactive Protein(CRP)  - Sedimentation Rate  - Influenza A/B Rapid Test- Nasal Swab  - Mononucleosis Screen  - Lyme Antibody Cascade  - HM1 (CBC with Diff)  - acetaminophen tablet 975 mg (TYLENOL)    3. Body aches  - HM1(CBC and Differential)  - Comprehensive Metabolic Panel  - D-dimer, Quantitative  - Urinalysis-UC if Indicated  - XR Chest 2 Views  - C-Reactive Protein(CRP)  - Sedimentation Rate  - Influenza A/B Rapid Test- Nasal Swab  - Mononucleosis Screen  - Lyme Antibody Cascade  - HM1 (CBC with Diff)  - acetaminophen tablet 975 mg (TYLENOL)    4. Cough  - HM1(CBC and Differential)  - Comprehensive Metabolic Panel  - D-dimer, Quantitative  - Urinalysis-UC if Indicated  - XR Chest 2 Views  - C-Reactive Protein(CRP)  - Sedimentation Rate  - Influenza A/B Rapid Test- Nasal Swab  - Mononucleosis Screen  - Lyme Antibody Cascade  - HM1 (CBC with Diff)    5. Chest tightness  - HM1(CBC and Differential)  - Comprehensive Metabolic Panel  - D-dimer,  Quantitative  - Urinalysis-UC if Indicated  - XR Chest 2 Views  - C-Reactive Protein(CRP)  - Sedimentation Rate  - Influenza A/B Rapid Test- Nasal Swab  - Mononucleosis Screen  - Lyme Antibody Cascade  - HM1 (CBC with Diff)      Extensive evaluation of febrile illness completed as listed above. CBC is normal. Urinalysis is negative. Influenza testing is negative. Mono Screen is negative. Lyme screening in process. CMP is normal. D-Dimer is negative, effectively ruling out pulmonary embolism. Inflammatory markers are in process at the time of discharge from clinic. Chest x-ray shows lingular pneumonia. Treating with doxycycline as listed above. Discussed symptomatic / supportive cares. A dose of acetaminophen was administered in clinic due to fever / chills.     Counseled patient regarding assessment and plan for evaluation and treatment. Questions were answered. See AVS for the specific written instructions and educational handout(s) regarding pneumonia that were provided at the conclusion of the visit.     Discussed signs / symptoms that warrant urgent / emergent medical attention.     Follow up with Primary Care for recheck within 48 hours.      History of Present Illness   Primary symptom: Flu / Cold / Cough  Onset: 2 weeks  Progression: Worsening  Fevers: Yes, for the past 3-4 days, Tmax 102.8 F (oral thermometer)  Chills: Yes, for the past 3-4 days  Sore throat: No  Nasal congestion: No  Rhinorrhea: No  Sinus pain / pressure: No, but has ongoing pain near the tooth extraction site on the left.   Ear pain: No  Headache: Yes  Body aches: Yes  Cough: Yes, for the past 2-3 days.   Shortness of breath: Yes, also feels tightness in the anterior mid chest with deep breaths.   Sputum production: No  Hemoptysis: No  Rash: No  GI symptoms: Was nauseated while taking clindamycin. No ongoing nausea, vomiting, or diarrhea.   Additional symptoms: None  Home therapies utilized: Alternating doses of acetaminophen and  ibuprofen.   History of asthma: No  History of pneumonia: Yes  Exposure to influenza: No  Exposure to strep: No  Other ill contacts: No  Recent travel: Flew to / from [place] in mid June. No calf pain or swelling noted.   Tobacco use / exposure: Former smoker  Additional pertinent history: Had a cracked / infected left upper molar last month. Was seen by her Endodontist on 7/1/2019 and had a CT scan completed that showed infection; was prescribed clindamycin. The tooth was extracted on 7/8/2019. Antibiotics were discontinued at that time. Has continued feeling more ill since then. Is taking alternating doses of acetaminophen and ibuprofen for pain and now for fever. No known tick bites.      Review of Systems   Review of Systems   All other systems reviewed and are negative.       Physical Exam   Vitals:    07/14/19 1148   BP: 120/79   Patient Site: Right Arm   Patient Position: Sitting   Cuff Size: Adult Regular   Pulse: (!) 109   Resp: 18   Temp: 99.5  F (37.5  C)   TempSrc: Oral   SpO2: 98%   Weight: 157 lb 14.4 oz (71.6 kg)     Physical Exam   Constitutional: She is oriented to person, place, and time. She appears well-developed and well-nourished.  Non-toxic appearance. No distress.   HENT:   Head: Normocephalic and atraumatic.   Right Ear: Tympanic membrane, external ear and ear canal normal.   Left Ear: Tympanic membrane, external ear and ear canal normal.   Nose: No mucosal edema or rhinorrhea. Right sinus exhibits no maxillary sinus tenderness and no frontal sinus tenderness. Left sinus exhibits no maxillary sinus tenderness and no frontal sinus tenderness.   Mouth/Throat: Uvula is midline, oropharynx is clear and moist and mucous membranes are normal. No oral lesions.   Left upper molar extraction site appears to be well healed without erythema or swelling. No swelling or erythema in the left cheek.    Eyes: Conjunctivae and lids are normal.   Neck: Neck supple. No edema and no erythema present.    Cardiovascular: Normal rate, regular rhythm, S1 normal and S2 normal. Exam reveals no gallop and no friction rub.   No murmur heard.  Pulmonary/Chest: Effort normal and breath sounds normal. No stridor. She has no wheezes. She has no rhonchi. She has no rales.   Lymphadenopathy:        Head (right side): No submental, no submandibular, no tonsillar and no preauricular adenopathy present.        Head (left side): No submental, no submandibular, no tonsillar and no preauricular adenopathy present.     She has no cervical adenopathy.   Neurological: She is alert and oriented to person, place, and time. She has normal strength. No cranial nerve deficit or sensory deficit.   Skin: Skin is warm and dry. No rash noted. She is not diaphoretic. No pallor.   Nursing note and vitals reviewed.       Diagnostic Studies   Laboratory:  Results for orders placed or performed in visit on 07/14/19   Influenza A/B Rapid Test- Nasal Swab   Result Value Ref Range    Influenza  A, Rapid Antigen No Influenza A antigen detected No Influenza A antigen detected    Influenza B, Rapid Antigen No Influenza B antigen detected No Influenza B antigen detected   Comprehensive Metabolic Panel   Result Value Ref Range    Sodium 141 136 - 145 mmol/L    Potassium 4.3 3.5 - 5.0 mmol/L    Chloride 106 98 - 107 mmol/L    CO2 22 22 - 31 mmol/L    Anion Gap, Calculation 13 5 - 18 mmol/L    Glucose 90 70 - 125 mg/dL    BUN 8 8 - 22 mg/dL    Creatinine 0.87 0.60 - 1.10 mg/dL    GFR MDRD Af Amer >60 >60 mL/min/1.73m2    GFR MDRD Non Af Amer >60 >60 mL/min/1.73m2    Bilirubin, Total 0.2 0.0 - 1.0 mg/dL    Calcium 9.8 8.5 - 10.5 mg/dL    Protein, Total 7.2 6.0 - 8.0 g/dL    Albumin 4.1 3.5 - 5.0 g/dL    Alkaline Phosphatase 64 45 - 120 U/L    AST 19 0 - 40 U/L    ALT 17 0 - 45 U/L   D-dimer, Quantitative   Result Value Ref Range    D-Dimer, Quant 0.32 <=0.50 FEU ug/mL   Urinalysis-UC if Indicated   Result Value Ref Range    Color, UA Yellow Colorless, Yellow,  Straw, Light Yellow    Clarity, UA Clear Clear    Glucose, UA Negative Negative    Bilirubin, UA Negative Negative    Ketones, UA Negative Negative    Specific Gravity, UA 1.025 1.005 - 1.030    Blood, UA Moderate (!) Negative    pH, UA 5.5 5.0 - 8.0    Protein, UA Trace (!) Negative mg/dL    Urobilinogen, UA 0.2 E.U./dL 0.2 E.U./dL, 1.0 E.U./dL    Nitrite, UA Negative Negative    Leukocytes, UA Negative Negative    Bacteria, UA Few (!) None Seen hpf    RBC, UA 5-10 (!) None Seen, 0-2 hpf    WBC, UA 0-5 None Seen, 0-5 hpf    Squam Epithel, UA 10-25 (!) None Seen, 0-5 lpf   Mononucleosis Screen   Result Value Ref Range    Mono Screen Negative Negative   HM1 (CBC with Diff)   Result Value Ref Range    WBC 8.9 4.0 - 11.0 thou/uL    RBC 4.60 3.80 - 5.40 mill/uL    Hemoglobin 13.6 12.0 - 16.0 g/dL    Hematocrit 40.4 35.0 - 47.0 %    MCV 88 80 - 100 fL    MCH 29.5 27.0 - 34.0 pg    MCHC 33.6 32.0 - 36.0 g/dL    RDW 12.8 11.0 - 14.5 %    Platelets 285 140 - 440 thou/uL    MPV 6.7 (L) 7.0 - 10.0 fL    Neutrophils % 71 (H) 50 - 70 %    Lymphocytes % 18 (L) 20 - 40 %    Monocytes % 9 2 - 10 %    Eosinophils % 2 0 - 6 %    Basophils % 1 0 - 2 %    Neutrophils Absolute 6.3 2.0 - 7.7 thou/uL    Lymphocytes Absolute 1.6 0.8 - 4.4 thou/uL    Monocytes Absolute 0.8 0.0 - 0.9 thou/uL    Eosinophils Absolute 0.2 0.0 - 0.4 thou/uL    Basophils Absolute 0.0 0.0 - 0.2 thou/uL     Radiology:  EXAM: XR CHEST 2 VIEWS  LOCATION: [facility]  DATE/TIME: 07/14/2019, 12:54 PM  INDICATION: Cough and fever.  COMPARISON: None.  FINDINGS: There is a small amount of patchy consolidation involving the lingula, consistent with pneumonia. The right lung is clear. No pleural effusion or pneumothorax. Heart and mediastinal size are normal.    Electrocardiogram:  N/A     Procedure Note   N/A     Pertinent History   The following portions of the patient's history were reviewed and updated as appropriate: allergies, current medications,  past family history, past medical history, past social history, past surgical history and problem list.     Trace Jett MD  [facility]

## 2021-05-30 NOTE — TELEPHONE ENCOUNTER
Patient Returning Call  Reason for call:  Returning VM   Information relayed to patient:  Informed patient Dr. Armas not accepting new patients, will have to reschedule appt.I explained we have openings with Abby Denis on this Thursday. Patient does not want to wait until Thursday. Patient hung up   Patient has additional questions:  No  If YES, what are your questions/concerns:  n/a  Okay to leave a detailed message?: Yes

## 2021-05-30 NOTE — PROGRESS NOTES
"Assessment:   1. Injury of left ankle, subsequent encounter  Recommend that patient continue to use the ankle brace and rest as prescribed. Follow up with orthopedic to review previous and current injury.   Provided work note for some restriction.  - Ambulatory referral to Orthopedics     Plan:   Natural history and expected course discussed. Questions answered.  Rest, ice, compression, and elevation (RICE)  therapy.  Educational materials distributed.  Reduced exercise program prescribed.  Home exercise plan outlined.  NSAIDs per medication orders.  OTC analgesics as needed.     Subjective:   Nora Jay is a 44 y.o. female who presents for follow up of left lower leg pain. This is a work injury. Patient was seen at the clinic 5 weeks ago by Dr Horne. 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 The ankle was swollen and was using crutches to get around. Xray was unremarkable and she has been using leg brace. 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 She reports that she has been applying ice. She is able to bear weight but it is painful. She is concerned that the injury might have affected her old ankle injury that lead to a surgery 4-6 years ago which was successful. She also noticed that her ankle is more swollen when she stands for a long time.     The following portions of the patient's history were reviewed and updated as appropriate: allergies, current medications, past family history, past medical history, past social history, past surgical history and problem list.    Review of Systems  Pertinent items are noted in HPI.       Objective:      /79 (Patient Site: Right Arm, Patient Position: Sitting, Cuff Size: Adult Regular)   Pulse (!) 102   Ht 5' 3\" (1.6 m)   Wt 155 lb 4.8 oz (70.4 kg)   LMP 10/23/2015   SpO2 98%   BMI 27.51 kg/m  "   Right leg:  negative exam findings: no effusion   Left leg:  positive exam findings: effusion

## 2021-05-31 ENCOUNTER — RECORDS - HEALTHEAST (OUTPATIENT)
Dept: ADMINISTRATIVE | Facility: CLINIC | Age: 46
End: 2021-05-31

## 2021-05-31 NOTE — PATIENT INSTRUCTIONS - HE
"MINDFULNESS    \"Mindfulness is about being fully awake in our lives. It is about perceiving the exquisite vividness of each moment.\"      - Jagdish Montiel  Mindfulness-Based Stress Reduction (MBSR) is a blend of meditation, body awareness, and yoga:   learning through practice and study how your body handles (and can resolve) stress neurologically.     Mindfulness Resources (all are free):  1. \"Calm\" scarlett- free trial has guided 10min sessions on anxiety, gratitude, sleep, happiness, managing stress, etc.   2. \"Smiling Minds\" scarlett- short guided sessions for children and adults  3. \"Curable Pain Relief\" scarlett- for chronic pain  4. \"Insight Timer\" scarlett- free meditation timer with musical or bell tones, can also stream guided meditations    5. Free 8 week MBSR program online: [email or web address]/      Thank you for discussing your sleep concerns in clinic today. In order to improve your sleep, I recommend:     --No pets in the bedroom  --No caffeine consumption after 4 p.m.  --Keep bedroom cool and conducive to sleep  --No watching the bedroom clock  --No nicotine use, especially in the evening  --No exercising within 2 to 3 hours before bedtime    Avoid excessive stimulation by doing the following:  --Go to bed only when sleepy  --Use the bedroom only for sleep and sex  --Go to another room if unable to fall asleep within 15 to 20 minutes  --Read or engage in other quiet activities and return to bed only when sleepy    It may be helpful to keep a sleep journal and record:  --How long it took to fall asleep the previous night  --Whether you took any sleep aid  --How many times you woke up during the night  --What time you woke up in the morning  --How restful you felt your sleep was the previous night  --When and if you took naps during the day    "

## 2021-06-01 VITALS — BODY MASS INDEX: 29.21 KG/M2 | WEIGHT: 164.9 LBS

## 2021-06-01 VITALS — HEIGHT: 63 IN | WEIGHT: 170.1 LBS | BODY MASS INDEX: 30.14 KG/M2

## 2021-06-02 VITALS — WEIGHT: 165.3 LBS | BODY MASS INDEX: 29.28 KG/M2

## 2021-06-02 VITALS — WEIGHT: 158.9 LBS | BODY MASS INDEX: 28.16 KG/M2 | HEIGHT: 63 IN

## 2021-06-02 VITALS — BODY MASS INDEX: 28.7 KG/M2 | WEIGHT: 162 LBS

## 2021-06-03 VITALS — BODY MASS INDEX: 28.31 KG/M2 | WEIGHT: 159.8 LBS

## 2021-06-03 VITALS — WEIGHT: 154.1 LBS | BODY MASS INDEX: 27.3 KG/M2 | HEIGHT: 63 IN

## 2021-06-03 VITALS — WEIGHT: 157.9 LBS | BODY MASS INDEX: 27.97 KG/M2

## 2021-06-03 VITALS — HEIGHT: 63 IN | WEIGHT: 155.3 LBS | BODY MASS INDEX: 27.52 KG/M2

## 2021-06-04 VITALS — HEIGHT: 63 IN | BODY MASS INDEX: 29.11 KG/M2 | WEIGHT: 164.3 LBS

## 2021-06-04 NOTE — PROGRESS NOTES
Medical  Weight Loss Follow-Up Diet Evaluation  Assessment:  Nora is presenting today for a follow up weight management nutrition consultation.   Weight loss medication: Phentermine.   Pt's Initial Weight: 170.1 lbs  Weight: 164 lb 4.8 oz (74.5 kg)  Weight loss from initial: 5.8  % Weight loss: 3.41 %    BMI: Body mass index is 29.1 kg/m .  IBW: 115 lbs    Estimated RMR ([place]-St Jeor equation):  1398 kcal  Recommended Protein Intake: 60-80 grams of protein/day  Patient Active Problem List:     Patient Active Problem List   Diagnosis     Herpes Simplex Type I     Migraine Headache     Ankle Sprain     Fibroids, subserous     Diabetes: No     Progress on goals from last visit: Pt reports high stress causing binge/emotional eating. 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 She's interested in learning about the 24 week program.     Dietary Recall:  Breakfast: none or banana  Snack:none   Lunch: fast food entree   Snack: none   Dinner:leftover meal  Snack: various items depending on emotion  Overnight eating: No  Eating out (frequency/week): 0-3x/week   Hydration (type/oz. per day):  Water: 50-60 oz   Caffeine:1 cup coffee, diet soda  Exercise:  Routine exercise established: No  Pt reports having ankle pain that limits exercise.      Nutrition Diagnosis:    Disordered Eating Pattern (NB 1.5) related to obsessive desire, intake of food exceeding RMR as evidenced by binge eating habits, frequent grazing, skipping meals, BMI 29.     Intervention:  1. Food and/or nutrient delivery: Discussed the 24 week program, with emphasis on using [employer] Meal Replacement products.   2. Nutrition education: Reviewed importance of mental health, stress management, and quality sleep for weight loss.   3. Nutrition counseling: Encouraged pt using motivational interviewing techniques.     Monitoring/Evaluation:    Goals:  1. Try bariatrix products (smoothie, pasta) a la carte  via pharmacy store. (Pt limited in usage d/t onion allergy)     Follow up:  Pt will follow up in 1 month(s) with bariatrician and 1 month(s) with dietitian.     Time spent with patient: 30 minutes  Joanne Voss RD     ABN signed: Yes

## 2021-06-05 VITALS
OXYGEN SATURATION: 97 % | WEIGHT: 155 LBS | SYSTOLIC BLOOD PRESSURE: 149 MMHG | RESPIRATION RATE: 18 BRPM | HEIGHT: 63 IN | HEART RATE: 88 BPM | DIASTOLIC BLOOD PRESSURE: 100 MMHG | BODY MASS INDEX: 27.46 KG/M2

## 2021-06-05 VITALS
TEMPERATURE: 98.3 F | HEART RATE: 86 BPM | RESPIRATION RATE: 16 BRPM | WEIGHT: 155 LBS | DIASTOLIC BLOOD PRESSURE: 66 MMHG | OXYGEN SATURATION: 97 % | SYSTOLIC BLOOD PRESSURE: 108 MMHG | BODY MASS INDEX: 27.46 KG/M2 | HEIGHT: 63 IN

## 2021-06-05 VITALS
DIASTOLIC BLOOD PRESSURE: 68 MMHG | HEIGHT: 63 IN | BODY MASS INDEX: 27.59 KG/M2 | SYSTOLIC BLOOD PRESSURE: 124 MMHG | HEART RATE: 76 BPM | WEIGHT: 155.7 LBS

## 2021-06-05 VITALS
HEART RATE: 100 BPM | RESPIRATION RATE: 18 BRPM | DIASTOLIC BLOOD PRESSURE: 82 MMHG | BODY MASS INDEX: 27.46 KG/M2 | OXYGEN SATURATION: 98 % | SYSTOLIC BLOOD PRESSURE: 132 MMHG | HEIGHT: 63 IN | WEIGHT: 155 LBS

## 2021-06-05 VITALS — HEIGHT: 63 IN | BODY MASS INDEX: 29.41 KG/M2 | WEIGHT: 166 LBS

## 2021-06-05 VITALS
WEIGHT: 155 LBS | DIASTOLIC BLOOD PRESSURE: 96 MMHG | SYSTOLIC BLOOD PRESSURE: 151 MMHG | HEIGHT: 63 IN | OXYGEN SATURATION: 100 % | HEART RATE: 90 BPM | BODY MASS INDEX: 27.46 KG/M2

## 2021-06-10 ENCOUNTER — COMMUNICATION - HEALTHEAST (OUTPATIENT)
Dept: SURGERY | Facility: CLINIC | Age: 46
End: 2021-06-10

## 2021-06-10 DIAGNOSIS — Z86.19 HISTORY OF COLD SORES: ICD-10-CM

## 2021-06-11 ENCOUNTER — AMBULATORY - HEALTHEAST (OUTPATIENT)
Dept: FAMILY MEDICINE | Facility: CLINIC | Age: 46
End: 2021-06-11

## 2021-06-11 DIAGNOSIS — Z86.19 HISTORY OF COLD SORES: ICD-10-CM

## 2021-06-11 RX ORDER — VALACYCLOVIR HYDROCHLORIDE 500 MG/1
1000 TABLET, FILM COATED ORAL 2 TIMES DAILY
Qty: 20 TABLET | Refills: 6 | Status: SHIPPED | OUTPATIENT
Start: 2021-06-11 | End: 2022-05-17

## 2021-06-14 NOTE — PATIENT INSTRUCTIONS - HE
Options for constipation:    1. Increase fluids (60-80oz fluids/24hr)  2. Increase dietary fiber (20-35 grams/day). Prunes, raisins or 4oz of prune/apple  juice daily are great options.  3. Try Medication options available over the counter at your pharmacy:    Start medication version of fiber: metamucil/psyllium once daily in a large 8oz glass of water    Miralax (laxative): Take 1 capful daily to maintain soft regular stools, and take up to 2-3 times daily for a few days,if constipation is worsening    Colace (stool softener): Take 1 or 2 times daily to keep stools soft    Ольга-colace (Colace softener + Senna stimulant) once daily- this would be used INSTEAD of the plain colace if you need stimulant      Milk of Magnesium or magnesium citrate as needed every few days    Bowel Cleanse option if severe:  Mix 64 ounces of Gatorade with 8.3 ounces of MiraLAX.  Drink 1 cup every 15 minutes until finished.  After the cleanse, please start MiraLAX 1 capful daily.        Podiatry options    [facility]: Dr. Larry Lemos, DPM  [facility]  [address]  [place], [place] [id number] [phone]

## 2021-06-14 NOTE — PATIENT INSTRUCTIONS - HE
Prescribed Gabapentin today, 300 mg tablets, to be titrated up to 3 tablets 3 times a day as tolerated for your nerve pain. Please follow Gabapentin dosing chart below.    Gabapentin 300mg Dosing Chart    DATE  MORNING AFTERNOON BEDTIME                                              Day 7 1 1 1    Day 8 1 1 1    Day 9 1 1 1    Day 10 1 1 2    Day 11 1 1 2    Day 12 1 1 2    Day 13 2 1 2    Day 14 2 1 2    Day 15 2 1 2    Day 16 2 2 2    Day 17 2 2 2    Day 18 2 2 2    Day 19 2 2 3    Day 20 2 2 3    Day 21 2 2 3    Day 22 3 2 3    Day 23 3 2 3    Day 24 3 2 3    Day 25 3 3 3    Day 26 3 3 3    Day 27 3 3 3     Continue medication, taking 3 capsules three times daily    Please call if you have any questions regarding how to take your medication  Clinic Phone # [phone]      Discussed the importance of core strengthening, ROM, stretching exercises with the patient and how each of these entities is important in decreasing pain.  Explained to the patient that the purpose of physical therapy is to teach the patient a home exercise program.  These exercises need to be performed every day in order to decrease pain and prevent future occurrences of pain.        You are scheduled with Dr. Mckeon in neurosurgery today.  Please be here at 320.    Is fine to take your other medications with the gabapentin.  Including cyclobenzaprine, the Medrol Dosepak, oxycodone acetaminophen and tramadol.    ~Please call Nurse Navigation line [phone] with any questions or concerns about your treatment plan, if symptoms worsen and you would like to be seen urgently, or if you have problems controlling bladder and bowel function.

## 2021-06-14 NOTE — PROGRESS NOTES
ASSESSMENT: Nora Jay is a 45 y.o. female presents for consultation at the request of HE PCP Amanda Lin MD, with past medical history significant for herpes simplex type I, migraine headache, ankle sprain, fibroids, who presents today for new patient evaluation of neck pain and right upper extremity pain:     -Patient is in severe pain.  Pain is likely secondary to right C6 radiculopathy.  She is in severe pain, however has normal reflexes and strength in her upper extremities.  I spoken with Dr. Mckeon and the patient will be seen by her this afternoon.    Patient is neurologically intact on exam. No myelopathic or red flag symptoms.      NDI Score: 74    WHO 5: 12     Diagnoses and all orders for this visit:    Cervical radiculitis  -     gabapentin (NEURONTIN) 300 MG capsule; Take 1 capsule (300 mg total) by mouth 3 (three) times a day. Increase to 3 tablets three times a day as instructed.  Dispense: 270 capsule; Refill: 1  -     Ambulatory referral to Physical Therapy  -     OPS TFESI C/T Spine Unilateral; Future; Expected date: 02/04/2021  -     Ambulatory referral to Neurosurgery    Cervical disc herniation  -     gabapentin (NEURONTIN) 300 MG capsule; Take 1 capsule (300 mg total) by mouth 3 (three) times a day. Increase to 3 tablets three times a day as instructed.  Dispense: 270 capsule; Refill: 1  -     Ambulatory referral to Physical Therapy  -     OPS TFESI C/T Spine Unilateral; Future; Expected date: 02/04/2021  -     Ambulatory referral to Neurosurgery    Severe pain  -     gabapentin (NEURONTIN) 300 MG capsule; Take 1 capsule (300 mg total) by mouth 3 (three) times a day. Increase to 3 tablets three times a day as instructed.  Dispense: 270 capsule; Refill: 1  -     Ambulatory referral to Physical Therapy  -     OPS TFESI C/T Spine Unilateral; Future; Expected date: 02/04/2021  -     Ambulatory referral to Neurosurgery       PLAN:  Reviewed spine anatomy and disease process.  Discussed diagnosis and treatment options with the patient today. A shared decision making model was used. The patient's values and choices were respected. The following represents what was discussed and decided upon by the provider and the patient.     -DIAGNOSTIC TESTS:  Images were personally reviewed and interpreted and explained to patient today using spine model.   --MRI of the cervical spine dated 2/2/2021 is personally viewed images interpreted discussed with patient.  There is a moderate sized right paracentral disc protrusion that is more rightward.  Cystlike lesion in the left thyroid with thyroid ultrasound recommended for further evaluation.    -PHYSICAL THERAPY: Ordered physical therapy for the patient.  Discussed the importance of core strengthening, ROM, stretching exercises with the patient and how each of these entities is important in decreasing pain.  Explained to the patient that the purpose of physical therapy is to teach the patient a home exercise program.  These exercises need to be performed every day in order to decrease pain and prevent future occurrences of pain.  Likened it to brushing one's teeth.      -MEDICATIONS: For gabapentin I will have her start at 300 mg 3 times a day and increase per chart to 5 given her.  It is fine for her to also take the oxycodone, tramadol, Medrol Dosepak and cyclobenzaprine that have been ordered from her.  -  Discussed multiple medication options today with patient. Discussed risks, side effects, and proper use of medications. Patient verbalized understanding.    -INTERVENTIONS: Ordered a right C5-6 transforaminal epidural steroid injection with Dr. Henderson for tomorrow.  Urgent approval will be needed.  Discussed risks and benefits of injections with patient today.    -PATIENT EDUCATION: We discussed pain management in a multimodal fashion including physical therapy, medication management, possible future injections.    -FOLLOW-UP:   Patient will  follow up after injection    Advised patient to call the [facility] if symptoms worsen or you have problems controlling bladder and bowel function.   ______________________________________________________________________    SUBJECTIVE:   Nora Jay  is a 45 y.o. female who presents today for new patient evaluation of neck pain and right upper extremity pain.  Patient is in severe pain.  She notes that this started 2 days ago.  She has been in constant pain since then.  It first started with neck pain and she has had this before where she will have a stiff neck, however after a few hours she started feeling pain down the back of her neck arm and into her hand.  She has numbness and tingling from her forearm into her hand.  Her pain today is 10/10 at its worst is 10/10 is best a 7/10.  She denies any bowel or bladder changes, fevers, chills, unintentional weight loss.  In emergency department she was giving oxycodone acetaminophen and also prescription for Medrol Dosepak and naproxen.  These have not been helpful as of yet.  He is also been to the urgent care prior to that and was prescribed cyclobenzaprine and tramadol.  She has not tried these yet.  The past has been on gabapentin with no side effects, however continue to be helpful for her migraines.  She is wearing a brace that was given to her in the emergency department.  Of asked her to take that off for now and let her know that she does not need to wear it at this time.  She denies any bowel or bladder changes, fevers, chills, unintentional weight loss.  She reports any sort of movement with her right arm or turning her head will increase her pain.    -Treatment to Date: MRI of the cervical spine dated 2/2/2021.    -Medications:    Current Outpatient Medications on File Prior to Encounter   Medication Sig Dispense Refill     aspirin-acetaminophen-caffeine (EXCEDRIN MIGRAINE) 250-250-65 mg per tablet Take 1 tablet by mouth every 6 (six) hours as needed  for pain.       cyclobenzaprine (FLEXERIL) 10 MG tablet Take 1 tablet (10 mg total) by mouth at bedtime for 10 days. 10 tablet 0     methylPREDNISolone (MEDROL DOSEPACK) 4 mg tablet Follow package directions 21 tablet 0     naproxen (NAPROSYN) 500 MG tablet Take 1 tablet (500 mg total) by mouth 2 (two) times a day with meals for 14 days. 28 tablet 0     omeprazole (PRILOSEC) 20 MG capsule Take 1 capsule (20 mg total) by mouth daily before breakfast. 14 capsule 0     oxyCODONE-acetaminophen (PERCOCET/ENDOCET) 5-325 mg per tablet Take 1 tablet by mouth every 6 (six) hours as needed for pain. 8 tablet 0     phentermine (ADIPEX-P) 37.5 mg tablet Take 1/2 to 1 tablet in the morning. 90 tablet 1     TiZANidine (ZANAFLEX) 2 MG capsule Take 1-2 capsules (2-4 mg total) by mouth at bedtime as needed for muscle spasms. 90 capsule 1     valACYclovir (VALTREX) 500 MG tablet Take 2 tablets (1,000 mg total) by mouth 2 (two) times a day. For 1 day. Save remaining tabs for subsequent flares 16 tablet 1     traMADoL (ULTRAM) 50 mg tablet Take 1 tablet (50 mg total) by mouth 4 (four) times a day for 3 days. Take with Tylenol for improved pain control 12 tablet 0     Current Facility-Administered Medications on File Prior to Encounter   Medication Dose Route Frequency Provider Last Rate Last Admin     [COMPLETED] HYDROmorphone injection 0.5 mg (DILAUDID)  0.5 mg Intravenous Once Sessions, Parth HALL MD   0.5 mg at 02/02/21 2032     [COMPLETED] ketorolac injection 60 mg (TORADOL)  60 mg Intramuscular Once Vinh Clemons PA-C   60 mg at 02/02/21 1708     [COMPLETED] LORazepam 0.5 mg injection (diluted concentration)  0.5 mg Intravenous Once Sessions, Parth HALL MD   0.5 mg at 02/02/21 1906     [COMPLETED] morphine injection 4 mg  4 mg Intravenous Once Sessions, Parth HALL MD   4 mg at 02/02/21 1835     [COMPLETED] ondansetron injection 4 mg (ZOFRAN)  4 mg Intravenous Once Sessions, Parth HALL MD   4 mg at 02/02/21 2465     [COMPLETED]  predniSONE tablet 60 mg (DELTASONE)  60 mg Oral Once Sessions, Parth HALL MD   60 mg at 21       Allergies   Allergen Reactions     Penicillins Hives     Venom-Honey Bee      Iohexol Rash     Post 10/29/15 CT IV contrast injection pt. Noted rash on stomach at home.  Unknown to what caused this/thought possible IV contrast reaction.       Past Medical History:   Diagnosis Date     Fibroids         Patient Active Problem List   Diagnosis     Herpes Simplex Type I     Migraine Headache     Ankle Sprain     Fibroids, subserous       Past Surgical History:   Procedure Laterality Date     ANKLE SURGERY Left     ligaments     HYSTERECTOMY       PA  DELIVERY ONLY      Description:  Section;  Recorded: 2008;     PA LAP,VAG HYST,UTERUS 250GMS/< N/A 10/27/2015    Procedure: LAPAROSCOPIC ASSISTED VAGINAL HYSTERECTOMY BILATERAL SALPINGECTOMY;  Surgeon: Av Lara MD;  Location: [facility];  Service: Gynecology       Family History   Problem Relation Age of Onset     Hypertension Father      Diabetes Father      Colon cancer Maternal Grandmother        Reviewed past medical, surgical, and family history with patient found on new patient intake packet located in EMR Media tab.     SOCIAL HX: She denies smoking, drinking alcohol or using recreational drugs.    ROS: Specifically negative for bowel/bladder dysfunction, balance changes, headache, dizziness, foot drop, fevers, chills, appetite changes, nausea/vomiting, unexplained weight loss. Otherwise 13 systems reviewed are negative. Please see the patient's intake questionnaire from today for details.    OBJECTIVE:  Pulse 96   Temp 98.2  F (36.8  C) (Oral)   LMP 10/23/2015   SpO2 97%     PHYSICAL EXAMINATION:  --CONSTITUTIONAL: Vital signs as above. No acute distress. The patient is well nourished and well groomed.  --PSYCHIATRIC: The patient is awake, alert, oriented to person, place, time and answering questions appropriately with  clear speech. Appropriate mood and affect   --HEENT: Sclera are non-injected.   --SKIN: Skin over the face, bilateral upper extremities, and posterior torso is clean, dry, intact without rashes.  --RESPIRATORY: Normal rhythm and effort. No abnormal accessory muscle breathing patterns noted.   --GROSS MOTOR: Easily arises from a seated position. Toe walking and heel walking are normal.    --CERVICAL SPINE: Inspection reveals no evidence of deformity. Range of motion is mildly limited in cervical flexion, extension, lateral rotation. No tenderness to palpation cervical spine.  Spurling maneuver negative bilaterally.  --SHOULDERS: Full range of motion bilaterally.  Increased pain with movement over her head or any movement of the arm.  --UPPER EXTREMITY MOTOR TESTING:  Wrist flexion left 5/5, right 5/5  Wrist extension left 5/5, right 5/5  Pronators left 5/5, right 5/5  Biceps left 5/5, right 5/5   Triceps left 5/5, right 5/5   Shoulder abduction left 5/5, right 5/5   left 5/5, right 5/5  --NEUROLOGIC: 2/4 symmetric biceps, brachioradialis, triceps reflexes bilaterally. Sensation to upper extremities is decreased in her right hand.  Negative Barone's bilaterally.    --VASCULAR: 2/4 radial pulses bilaterally.     RESULTS: Prior medical records from [facility] emergency department were reviewed today.     Imaging:  Cervical spine Imaging was personally reviewed and interpreted today. The images were shown to the patient and the findings were explained using a spine model.       Xr Cervical Spine 2 - 3 Vws    Result Date: 2/2/2021  EXAM: XR CERVICAL SPINE 2 - 3 VWS LOCATION: [facility] DATE/TIME: 2/2/2021 5:05 PM INDICATION: cervical radiculopathy right sided neck pain COMPARISON: None. TECHNIQUE: CR Cervical Spine.     Mild reversal normal lordotic curvature. Mild degenerative changes worse at C5-C6. No radiographic acute fractures. Vertebral heights maintained. No significant subluxations.  No prevertebral soft tissue swelling. Predental space within normal  limits. Limited evaluation dens appears intact.     Mr Cervical Spine Without Contrast    Result Date: 2/2/2021  EXAM: MR CERVICAL SPINE WO CONTRAST LOCATION: [facility] DATE/TIME: 2/2/2021 7:40 PM INDICATION: Neck pain, normal neuro exam. Sudden onset severe atraumatic neck pain with radiation to right arm. COMPARISON: 2/2/2021 cervical radiographs. 10/14/2016 cervical radiographs. TECHNIQUE: MRI Cervical Spine without IV contrast. FINDINGS: Normal vertebral body heights. Reversal of the normal cervical lordosis at C5-C6 noted. Chronic appearing Schmorl's node at the lower endplate of C5. Negative for bone or ligamentous edema. No abnormal cord signal. 13 mm cystlike area within the left thyroid lobe is present. Recommend dedicated ultrasound of the thyroid for further evaluation. This workup can be performed on an outpatient basis. Craniovertebral junction and C1-C2: Normal. C2-C3: Normal disc height. No herniation. Normal facets. No spinal canal or neural foraminal stenosis. C3-C4: Normal disc height. No herniation. Normal facets. No spinal canal or neural foraminal stenosis. C4-C5: Normal disc height. No herniation. Normal facets. No spinal canal or neural foraminal stenosis. C5-C6: Moderate sized right paracentral disc extrusion is present measuring 12 mm mediolateral by 5 mm AP by 6.5 mm cephalocaudad and extending above and below the adjacent endplates. Ventral cord flattening with moderate spinal central canal stenosis identified. No evidence for cord edema, syrinx or hemorrhage. Slight narrowing of the medial aspect of the right neural foramen with mild contact upon the exiting right C6 nerve. Left foramen patent. C6-C7: Disc desiccation and minimal posterior annular bulge without focal protrusion. Patent central canal and foramen. C7-T1: Normal disc height. No herniation. Normal facets. No spinal canal or neural  foraminal stenosis.     1.  Moderate-sized right paracentral C5-C6 disc extrusion with ventral cord flattening and central canal stenosis. This appears to contact the medial aspect of the exiting right C6 nerve. 2.  Cystlike lesion within the left thyroid lobe. Dedicated thyroid ultrasound recommended for further evaluation. (Outpatient).

## 2021-06-14 NOTE — TELEPHONE ENCOUNTER
Leila was seen at [facility] yesterday and then went to ER and had an MRI and has a cervical disc herniation in her neck.  Leila is in severe pain.  Pain medication she is taking is percocet.  FNA advised to return to ED and patient does not want to return.  Patient asking if we can contact MD at hospital.  FNA advised that MD at hospital does not follow patient once they are discharged.  Today Leila is requesting to speak with primary MD Amanda Lin about getting her pain under control.  Leila is going to reach out to a neurosurgeon that she was referred to.  Leila is requesting primary MD phone her back as soon as possible and is requesting a high priority message be sent.       COVID 19 Nurse Triage Plan/Patient Instructions    Please be aware that novel coronavirus (COVID-19) may be circulating in the community. If you develop symptoms such as fever, cough, or SOB or if you have concerns about the presence of another infection including coronavirus (COVID-19), please contact your health care provider or visit [email or web address].     Disposition/Instructions    In-Person Visit with provider recommended. Reference Visit Selection Guide.    Thank you for taking steps to prevent the spread of this virus.  o Limit your contact with others.  o Wear a simple mask to cover your cough.  o Wash your hands well and often.    Resources    [facility]: About COVID-19: [email or web address]    CDC: What to Do If You're Sick: www.cdc.gov/coronavirus/2019-ncov/about/steps-when-sick.html    CDC: Ending Home Isolation: www.cdc.gov/coronavirus/2019-ncov/hcp/disposition-in-home-patients.html     CDC: Caring for Someone: www.cdc.gov/coronavirus/2019-ncov/if-you-are-sick/care-for-someone.html     [facility]: Interim Guidance for Hospital Discharge to Home: www.health.[facility].[place]/diseases/coronavirus/hcp/hospdischarge.pdf    [facility] clinical trials (COVID-19 research studies):  [email or web address]     Below are the COVID-19 hotlines at the [place] Department of Health ([facility]). Interpreters are available.   o For health questions: Call 810-578-1727 or 1-730.872.9437 (7 a.m. to 7 p.m.)  o For questions about schools and childcare: Call 491-254-1371 or 1-644.739.9914 (7 a.m. to 7 p.m.)    Please phone Leila.      Reason for Disposition    SEVERE pain (e.g., excruciating, unable to do any normal activities)    Additional Information    Negative: Shock suspected (e.g., cold/pale/clammy skin, too weak to stand, low BP, rapid pulse)    Negative: Similar pain previously and it was from 'heart attack'    Negative: Similar pain previously from 'angina' and not relieved by nitroglycerin    Negative: Difficult to awaken or acting confused (e.g., disoriented, slurred speech)    Negative: Sounds like a life-threatening emergency to the triager    Negative: Difficulty breathing or unusual sweating (e.g., sweating without exertion)    Negative: Chest pain lasting longer than 5 minutes    Negative: Stiff neck (can't touch chin to chest) and has headache    Negative: Stiff neck (can't touch chin to chest) and fever    Negative: Weakness of an arm or hand    Negative: Problems with bowel or bladder control    Negative: Patient sounds very sick or weak to the triager    Protocols used: NECK PAIN OR [id number]

## 2021-06-14 NOTE — TELEPHONE ENCOUNTER
It looks like pt has appt scheduled with spine clinic today at 11am; appreciate they were able to get her in so quickly. Will wait eval/recs

## 2021-06-14 NOTE — TELEPHONE ENCOUNTER
Leila reports she has severe neck pain, has shooting pain to her right arm and fingers.    Pain started yesterday but got worse today. Took ibuprofen this morning which helped her at work.     She also states she just took 800 mg of ibuprofen at about 2-3 pm, but has not helped much.  Has tried warm packs but has not helped.    PLAN:  - clinic visit today per protocol, FNA advised [facility].  - care advice reviewed  - call back for further concerns  - patient verbalized understanding and will head to [facility]    Kecia Gibson RN/[place] Nurse Advisor      Reason for Disposition    SEVERE pain (e.g., excruciating, unable to do any normal activities)    Additional Information    Negative: Shock suspected (e.g., cold/pale/clammy skin, too weak to stand, low BP, rapid pulse)    Negative: Similar pain previously and it was from 'heart attack'    Negative: Similar pain previously from 'angina' and not relieved by nitroglycerin    Negative: Difficult to awaken or acting confused (e.g., disoriented, slurred speech)    Negative: Sounds like a life-threatening emergency to the triager    Negative: Difficulty breathing or unusual sweating (e.g., sweating without exertion)    Negative: Chest pain lasting longer than 5 minutes    Negative: Stiff neck (can't touch chin to chest) and has headache    Negative: Weakness of an arm or hand    Negative: Stiff neck (can't touch chin to chest) and fever    Negative: Problems with bowel or bladder control    Negative: Patient sounds very sick or weak to the triager    Protocols used: NECK PAIN OR [id number]

## 2021-06-14 NOTE — TELEPHONE ENCOUNTER
PSP:  New patient scheduled for tomorrow  Last clinic visit:  ER yesterday with MRI done  Reason for call: Severe pain and numbness in right arm  Clinical information:  Patient calling as she is not getting relief with medications she received through ER last night. Wondering what else she can do. Reviewed chart.  Advice given to patient: Discussed that she should take the Medrol Pack as prescribed. Typically don't take NSAIDs when on oral steroids so explained she should hold on that. Explained she could try taking 1 ES Tylenol with each dose of Percocet. Opening/cancellation noted on Dr. Espinoza' schedule for late this AM. Transferred to scheduling to change her appointment date.

## 2021-06-15 NOTE — PATIENT INSTRUCTIONS - HE
DISCHARGE INSTRUCTIONS    During office hours (8:00 a.m.- 4:00 p.m.) questions or concerns may be answered  by calling [facility] Navigation Nurses at  [phone].  Messages received after hours will be returned the following business day.      In the case of an emergency, please dial 911 or seek assistance at the nearest Emergency Room/Urgent Care facility.     All Patients:    ? You may experience an increase in your symptoms for the first 2 days (It may take anywhere between 2 days- 2 weeks for the steroid to have maximum effect).    ? You may use ice on the injection site, as frequently as 20 minutes each hour if needed.    ? You may take your pain medicine.    ? You may continue taking your regular medication after your injection. If you have had a Medial Branch Block you may resume pain medication once your pain diary is completed.    ? You may shower. No swimming, tub bath or hot tub for 48 hours.  You may remove your bandaid/bandage as soon as you are home.    ? You may resume light activities, as tolerated.    ? Resume your usual diet as tolerated.    ? It is strongly advised that you do not drive for 1-3 hours post injection.    ? If you have had oral sedation:  Do not drive for 8 hours post injection.      ? If you have had IV sedation:  Do not drive for 24 hours post injection.  Do not operate hazardous machinery or make important personal/business decisions for 24 hours.      POSSIBLE STEROID SIDE EFFECTS (If steroid/cortisone was used for your procedure)    -If you experience these symptoms, it should only last for a short period      Swelling of the legs                Skin redness (flushing)       Mouth (oral) irritation     Blood sugar (glucose) levels              Sweats                      Mood changes    Headache    Sleeplessness    Weakened immune system for up to 14 days, which could increase the risk of sudhir the COVID-19 virus and/or experiencing more severe symptoms of the  disease, if exposed.    Decreased effectiveness of the flu vaccine if given within 2 weeks of the steroid.         POSSIBLE PROCEDURE SIDE EFFECTS  -Call the Spine Center if you are concerned    Increased Pain             Increased numbness/tingling        Nausea/Vomiting            Bruising/bleeding at site        Redness or swelling                                                Difficulty walking        Weakness             Fever greater than 100.5    *In the event of a severe headache after an epidural steroid injection that is relieved by lying down, please call the [facility] to speak with a clinical staff member*

## 2021-06-15 NOTE — PATIENT INSTRUCTIONS - HE
A dressing is not required.    Keep the wound clean.    Wash your hands before touching the wound.  Ensure that anyone assisting you in the care of your wound washes her/his hands before touching the wound. Good handwashing can decrease the risk of serious infection.    If you are unable to see your wound, have someone check the wound daily for redness, swelling,or drainage. A small amount of drainage is normal.    You may shower.  Pat the wound dry. Do not rub.    No tub baths until the wound is well healed.  Usually 5-6 weeks.     If you develop redness, swelling, drainage, or temp 101 or greater, call our office at [phone].        * No lifting, pushing or pulling greater than 5-10 pound (this is about a gallon of milk) for the first 6 weeks after surgery .  *No repetitive bending, twisting, or jarring activities for 6 weeks.  *No overhead work  *No aerobic or strenuous activity  *No activities with increased risk of falls  *You may move about your home as tolerated  *You may walk up and down stairs as tolerated  *You may increase your activity slowly over the next 6 weeks    WALKING PROGRAM: As you can tolerate, walk daily-start with 5-10 minutes of continuous walking. This is in addition to the walking that you do as part of your daily activities. Increase the time that you walk by 5 minutes every couple of days. Do not exceed 30-45 minutes of continuous walking until seen in follow-up. Walking is the best exercise after surgery.  **Listen to your body, if you find that you are more painful or fatigued, you may need to proceed more slowly.    **Do not smoke or expose yourself to second hand smoke. Cigarette smoke can delay healing and cause complications.     DRIVING:  We recommend that you do not drive while taking medications for pain or muscle spasms. Always read and follow the advice on your prescription bottle. If you have questions, speak with your pharmacist.  We recommend that you do not drive  while wearing a brace, as it could limit your range of motion.    WORK: If you plan to return to work before you 4-6 weeks appointment, call and discuss with one of the nurses in the neurosurgery office.

## 2021-06-15 NOTE — PROGRESS NOTES
[address] [address]  Dept: [phone]  Dept Fax: [phone]  Primary Provider: Amanda Lin MD  PREOPERATIVE EVALUATION:  Today's date: 2/11/2021    Nora Jay is a 45 y.o. female who presents for a preoperative evaluation.    Surgical Information:  Surgery/Procedure: CERVICAL 5-CERVICAL 6 ANTERIOR CERVICAL DECOMPRESSION AND FUSION WITH PLATE  Surgery Location: [facility]  Surgeon: Dr. Savannah Mckeon  Surgery Date: 2/15/2021  Time of Surgery: 11:50 am  Where patient plans to recover: At home with family  Fax number for surgical facility: Note does not need to be faxed, will be available electronically in Epic.    Type of Anesthesia Anticipated: General    Assessment & Plan      The proposed surgical procedure is considered INTERMEDIATE risk.    Preop general physical exam  Cervical radiculopathy; severe  Medically optimized to proceed with surgery. Patient has not seen significant improvement wit multiple medications nor TFESI on 2/4.  Pre op labs done per neurosurgery yesterday.  Reviewed: normal BMP, INR 0.85, PTT 30, Hb 13.2, plts 330.  COVID test in process.    Thyroid cyst  Cystlike lesion within the left thyroid lobe seen incidentally on recent MRI; needs dedicated finding.  - US Thyroid             Risks and Recommendations:  The patient has the following additional risks and recommendations for perioperative complications:   - No identified additional risk factors other than previously addressed      RECOMMENDATION:  APPROVAL GIVEN to proceed with proposed procedure, without further diagnostic evaluation.        Subjective     HPI related to upcoming procedure:   Acute worsening of her chronic neck pain on 2/2/2021, with Right arm pain and numbness and tingling. Planning on C5-C6 anterior decompression of disc extrusion due to signfiicant pain not improving with conservative tx.     Tremendous pain affecting activities of daily life and  awakes her from sleep. No improvement on medrol dose pack. Gabapentin makes her too sedated/dizzy and not improved numbness. Currently taking flexeril or xanaflex (but not together), tyelnol and nsaids. Doing ice/heat. Thumb and arm in C6 distribution 90% numb.     MRI showed a right paracentral C5-6 disc extrusion with ventral cord flattening and moderate spinal canal stenosis and compression of the exiting C6 nerve. Xray shows mild reversal of the lordotic curve centered at C5-6.     Underwent TFESI on 2/4 with immediate relief but very short term only       Really horrible sleep lately- tramadol not helpful.      Preop Questions 2/11/2021   Have you ever had a heart attack or stroke? No   Have you ever had surgery on your heart or blood vessels, such as a stent placement, a coronary artery bypass, or surgery on an artery in your head, neck, heart, or legs? No   Do you have chest pain with activity? No   Do you have a history of  heart failure? No   Do you currently have a cold, bronchitis or symptoms of other infection? No   Do you have a cough, shortness of breath, or wheezing? No   Do you or anyone in your family have previous history of blood clots? No   Do you or does anyone in your family have a serious bleeding problem such as prolonged bleeding following surgeries or cuts? No   Have you ever had problems with anemia or been told to take iron pills? No   Have you had any abnormal blood loss such as black, tarry or bloody stools, or abnormal vaginal bleeding? No   Have you ever had a blood transfusion? No   Are you willing to have a blood transfusion if it is medically needed before, during, or after your surgery? Yes   Have you or any of your relatives ever had problems with anesthesia? No   Do you have sleep apnea, excessive snoring or daytime drowsiness? No   Do you have any artifical heart valves or other implanted medical devices like a pacemaker, defibrillator, or continuous glucose monitor? No   Do  you have artificial joints? No   Are you allergic to latex? No   Is there any chance that you may be pregnant? No         Health Care Directive:  Patient does not have a Health Care Directive or Living Will: Discussed advance care planning with patient; however, patient declined at this time.    Preoperative Review of :    reviewed - controlled substances reflected in medication list.    See problem list for active medical problems.  Problems all longstanding and stable, except as noted/documented.  See ROS for pertinent symptoms related to these conditions.      Review of Systems  CONSTITUTIONAL: NEGATIVE for fever, chills, change in weight  INTEGUMENTARY/SKIN: NEGATIVE for worrisome rashes (we discussed likely heat rash or folliculitis (mild) of the right arm as she was placing heat/ice packs there and not affecting her anywhere else), moles or lesions  EYES: NEGATIVE for vision changes or irritation  ENT/MOUTH: NEGATIVE for ear, mouth and throat problems  RESP: NEGATIVE for significant cough or SOB  CV: NEGATIVE for chest pain, palpitations or peripheral edema  GI: NEGATIVE for nausea, abdominal pain, heartburn, or change in bowel habits  : NEGATIVE for frequency, dysuria, or hematuria  MUSCULOSKELETAL: negative for significant arthralgias or myalgia  NEURO: POSITIVE for weakness, numbness of the right arm C6 distribution  ENDOCRINE: NEGATIVE for temperature intolerance, skin/hair changes  HEME: NEGATIVE for bleeding problems  PSYCHIATRIC: NEGATIVE for changes in mood or affect    Patient Active Problem List    Diagnosis Date Noted     Cervical radiculopathy 02/10/2021     Fibroids, subserous 10/27/2015     Herpes Simplex Type I      Migraine Headache      Ankle Sprain      Past Medical History:   Diagnosis Date     Fibroids      Past Surgical History:   Procedure Laterality Date     ANKLE SURGERY Left     ligaments     HYSTERECTOMY       [place]  DELIVERY ONLY      Description:  Section;   Recorded: 03/04/2008;     [place] LAP,VAG HYST,UTERUS 250GMS/< N/A 10/27/2015    Procedure: LAPAROSCOPIC ASSISTED VAGINAL HYSTERECTOMY BILATERAL SALPINGECTOMY;  Surgeon: Av Lara MD;  Location: [facility];  Service: Gynecology     Current Outpatient Medications   Medication Sig Dispense Refill     omeprazole (PRILOSEC) 20 MG capsule Take 1 capsule (20 mg total) by mouth daily before breakfast. 14 capsule 0     TiZANidine (ZANAFLEX) 2 MG capsule Take 1-2 capsules (2-4 mg total) by mouth at bedtime as needed for muscle spasms. 90 capsule 1     traMADoL (ULTRAM) 50 mg tablet Take 1 tablet (50 mg total) by mouth every 6 (six) hours as needed for pain. 20 tablet 0     aspirin-acetaminophen-caffeine (EXCEDRIN MIGRAINE) 250-250-65 mg per tablet Take 1 tablet by mouth every 6 (six) hours as needed for pain.       cyclobenzaprine (FLEXERIL) 10 MG tablet Take 1 tablet (10 mg total) by mouth at bedtime for 10 days. 10 tablet 0     gabapentin (NEURONTIN) 300 MG capsule Take 1 capsule (300 mg total) by mouth 3 (three) times a day. Increase to 3 tablets three times a day as instructed. 270 capsule 1     methylPREDNISolone (MEDROL DOSEPACK) 4 mg tablet Follow package directions 21 tablet 0     naloxone (NARCAN) 4 mg/actuation nasal spray 1 spray (4 mg dose) into one nostril for opioid reversal. Call 911. May repeat if no response in 3 minutes. 1 Box 0     naproxen (NAPROSYN) 500 MG tablet Take 1 tablet (500 mg total) by mouth 2 (two) times a day with meals for 14 days. 28 tablet 0     phentermine (ADIPEX-P) 37.5 mg tablet Take 1/2 to 1 tablet in the morning. 90 tablet 1     valACYclovir (VALTREX) 500 MG tablet Take 2 tablets (1,000 mg total) by mouth 2 (two) times a day. For 1 day. Save remaining tabs for subsequent flares 16 tablet 1     No current facility-administered medications for this visit.        Allergies   Allergen Reactions     Penicillins Hives     Venom-Honey Bee      Iohexol Rash     Post 10/29/15 CT IV  "contrast injection pt. Noted rash on stomach at home.  Unknown to what caused this/thought possible IV contrast reaction.       Social History     Tobacco Use     Smoking status: Former Smoker     Smokeless tobacco: Never Used   Substance Use Topics     Alcohol use: No        Social History     Substance and Sexual Activity   Drug Use No        Objective     /66 (Patient Site: Left Arm, Patient Position: Sitting, Cuff Size: Adult Regular)   Pulse 86   Temp 98.3  F (36.8  C) (Oral)   Resp 16   Ht 5' 3\" (1.6 m)   Wt 155 lb (70.3 kg)   LMP 10/23/2015   SpO2 97%   BMI 27.46 kg/m    Physical Exam    GENERAL APPEARANCE: healthy, alert and no distress     EYES: EOMI, PERRL     HENT: ear canals and TM's normal and nose and mouth without ulcers or lesions     NECK: no adenopathy, no asymmetry, masses, or scars and thyroid normal to palpation     RESP: lungs clear to auscultation - no rales, rhonchi or wheezes     CV: regular rates and rhythm, normal S1 S2, no S3 or S4 and no murmur, click or rub     ABDOMEN:  soft, nontender, no HSM or masses and bowel sounds normal     MS: Palpation of the cervical neck is nontender. extremities normal- no gross deformities noted, no evidence of inflammation in joints, FROM in all extremities.     SKIN: no suspicious lesions or rashes     NEURO: Normal strength and tone, sensory exam grossly normal, mentation intact and speech normal.  Right upper extremity numbness to superficial light touch in the C6 distribution.  Decreased  strength on the right side.  She is holding her arm carefully do not reproduce any pain.     PSYCH: mentation appears normal. and affect normal/bright     LYMPHATICS: No cervical adenopathy    Recent Labs   Lab Test 02/10/21  1451 02/02/21  1903 02/02/21  1831   HGB 13.2  --  12.8     --  350   INR 0.85*  --   --     139  --    K 4.0 4.1  --    CREATININE 0.74 0.76  --         PRE-OP Diagnostics:   Labs pending at this time. Results " will be reviewed when available.  No EKG required, no history of coronary heart disease, significant arrhythmia, peripheral arterial disease or other structural heart disease.    REVISED CARDIAC RISK INDEX (RCRI)   The patient has the following serious cardiovascular risks for perioperative complications:   - No serious cardiac risks = 0 points    RCRI INTERPRETATION: 0 points: Class I (very low risk - 0.4% complication rate)       Signed Electronically by: Amanda Lin MD    Copy of this evaluation report is provided to requesting physician.    [facility] Guidelines    Revised Cardiac Risk Index

## 2021-06-15 NOTE — TELEPHONE ENCOUNTER
Nora Jay is status post Anterior cervical discectomy and arthrodesis cervical 5-cervical 6 on 02/15/2021 with Dr. Mckeon.  Last seen on 02/22/2021 for wound check.      Today Leila is requesting another Oxycodone refill as she is still dealing with intermittent mild to moderate pain in her neck and in between shoulder blades. Denies radiculopathy. Would like to start on Flexeril because Robaxin is not beneficial for muscle spasms. Takes Oxycodone 5 mg q 6 hours. Uses ice packs.  F/u scarlett on 03/30/2021.    MNPMP query completed.  Printed and scanned into chart.     Elif Jones, RN, CNRN

## 2021-06-15 NOTE — PROGRESS NOTES
Patient states that she is still having moderate constant pain. The gabapentin she thinks is helping a little but it is also making her feel floaty and nauseas. She mentions that she has noticed on her right forearm she has a mild rash, not sure if it is related.  Ivet Odom,[facility],1:33 PM     Neck Disability Index Questionnaire    1. PAIN INTENSITY  3- The pain is moderate and does not vary much  2. PERSONAL CARE  2- It is painful to look after myself and I am slow and careful  3. LIFTING  1- I can lift heavy weights, but it gives me extra pain  4. READING  3- I cannot read as much as I want because of moderate pain in my neck.  5. HEADACHES  4- I have severe headaches, which come frequently  6. CONCENTRATION  3- I have a lot of difficulty in concentrating when I want to.  7. WORK  2-I can do most of my usual work, but no more  8. DRIVING  3- I cannot drive my car as long as I want because of moderate pain in my neck  9. SLEEPING  4- My sleep is greatly disturbed (3-5 hrs sleepless).  10. RECREATION  3- I am able to engage in a few of my usual recreational activities due to pain in my neck      SCORE:28x2=56%

## 2021-06-15 NOTE — TELEPHONE ENCOUNTER
Patient returning Elif's call. Patient stated she is isn't doing very good post-injection. Patient stated her arm is in a lot of pain and is affecting sleep.     Please call [phone].

## 2021-06-15 NOTE — TELEPHONE ENCOUNTER
"Patient calling for a refill of oxycodone.     DOS: 2/15/2021  Procedure: C5-6 ACDF  Surgeon: Mike    Current symptom(s): burning sensation \"zing\" at base of neck and shoulders, more on the left side started 3 days ago. Difficulty getting dressed. waking up once at night (4-5 AM) taking Oxy/tylenol at that time    Current pain management: oxycodone 5 mg tabs every 8 hours, 1 tab with 500 mg Tylenol, alternating with Zanaflex (initiated by PCP), icing frequently,      Last fill: 3/1 (21#)  Next visit: 3/30    Medication pended for your approval, if appropriate. Pharmacy verified. MNPMP query completed-no discrepancies.  Printed and scanned into chart.     Any patient questions or concerns: 3 tabs per day of oxycodone as prescribed, will still run out on 3/8 and is on vacation until 3/15 in [place]. Pt leaves at 7pm on Saturday 3/6.     Informed patient request will be forwarded to care team.     Becky Diallo RN    "

## 2021-06-15 NOTE — TELEPHONE ENCOUNTER
Prior Authorization obtained for Right C5-6 TFESI from 2/4/2021 to 2/17/2021.  The Authorization number is [id number].  Please have the patient scheduled with Dr. Henderson tomorrow, 2/5/2021.

## 2021-06-15 NOTE — PATIENT INSTRUCTIONS - HE

## 2021-06-15 NOTE — PROGRESS NOTES
"NEUROSURGERY FOLLOWUP  NOTE    Nora Jay comes today in f/u.  44 yo female who presents with right arm pain and numbness and tingling.  MRI showed a right paracentral C5-6 disc extrusion with ventral cord flattening and moderate spinal canal stenosis and compression of the exiting C6 nerve. Xray shows mild reversal of the lordotic curve centered at C5-6. Patient has not seen significant improvement wit multiple medications. In tremendous pain affecting ADLs and fairly profound numbess.Underwent TFESI on 2/4 with immediate relief but very short term only.  She returns in f/u.    Her pain and numbness is highly bothersome. Affecting activities of daily life and awakes her from sleep. No improvement on medrol dose pack. Gabapentin makes her too sedated and not improved numbness. Currently taking flexeril, tyelnol and nsaids. Thumb and arm in C6 distribution 90% numb.    PHYSICAL EXAM:   Constitutional: /82   Pulse 100   Resp 18   Ht 5' 3\" (1.6 m)   Wt 155 lb (70.3 kg)   LMP 10/23/2015   SpO2 98%   BMI 27.46 kg/m       Mental Status: A & O in no acute distress.  Affect is appropriate.  Speech is fluent.  Recent and remote memory are intact.  Attention span and concentration are normal.     Motor: Normal bulk and tone all muscle groups of upper and lower extremities.     Sensory: Sensation intact bilaterally to light touch. Diminished to LT in right C6 distriubtion 10% normal      Gait: intact     Reflexes; no hoffmans    IMAGING:   I personally reviewed all radiographic images     CONSULTATION ASSESSMENT AND PLAN:    44 yo female who presents with right arm pain and numbness and tingling.  MRI showed a right paracentral C5-6 disc extrusion with ventral cord flattening and moderate spinal canal stenosis and compression of the exiting C6 nerve. Xray shows mild reversal of the lordotic curve centered at C5-6. Patient has not seen significant improvement wit multiple medications.Short term relief only with " TFESI. Given how bothersome her symptoms are do not believe she would tolerate PT well. Discussed risks and benefits of a cervical 5-6 anterior cervical decompression and fusion. She agreed to proceed.     I spent more than 40 minutes in this apt, examining the pt, reviewing the scans, reviewing notes from chart, discussing treatment options with risks and benefits and coordinating care.       Savannah Mckeon      CC:     Amanda Lin MD  [id number]  [facility] [address]

## 2021-06-15 NOTE — PROGRESS NOTES
Nora Jay is status post Anterior cervical discectomy and arthrodesis cervical 5-cervical 6 on 02/15/2021 with Dr. Mckeon.  Preoperatively presented with right arm pain and numbness and tingling.  Today she returns in follow up fr wound check. She is doing reasonably well - reports intermittent pain in posterior neck, in between shoulder blades and shoulders associated with ROM. Denies arm pain, reports improved strength in RUE. No paresthesia. Gait and balance are normal. Takes Oxycodone 2.5 mg and Robaxin 750 mg prn. Uses Salonpas, and ice packs.   She requested a soft collar for comfort.      Surgical wound WNL - CDI, no signs of infection or skin breakdown.  Incision well-healed: good skin approximation, no redness or visible/palpable edema, no tenderness to palpation.  PT. AF, denies fever, chills or sweats.  Pt. reports that the symptoms are improved from pre-op.    Elif Jones RN, CNRN

## 2021-06-15 NOTE — TELEPHONE ENCOUNTER
PATIENT NAME:  Nora Jay  YOB: 1975  MRN: [id number]  SURGEON: Dr. Mckeon  DATE of SURGERY: 02/15/2021  PROCEDURE: ACDF at C6-7      FOLLOW-UP PLAN:  Wound Check:  7-10 days  Staples/Sutures Out : n/a  Post Op Visit: 6 weeks  Post-op Provider:  NP on Dr. Mckeon clinic day  DIAGNOSTICS:  XR  DISPOSITION:  Home 02/16/2021      ADDITIONAL INSTRUCTIONS FOR MEDICAL STAFF:      Elif Jones RN, CNRN

## 2021-06-15 NOTE — TELEPHONE ENCOUNTER
ORDER FROM: Dr. Mckeon    PRE AUTHORIZATION: PA Approved. Ok to schedule.    METHOD OF PATIENT CONTACT: Spoke with Leila on the phone. Best number to reach: [phone]    PROCEDURE: Cervical 5-cervical 6 anterior cervical decompression and fusion with plate.    SURGICAL DATE: 02/15/21 @ 11:50 AM - [facility]    READINESS VISIT: PLEASE CALL    PCP, CLINIC, PHONE #: Dr. Amanda Lin, [facility], [phone]    PRE-OP PHYSICAL: 21 @ 11:20 AM with Dr. Lin    COVID-19 TESTIN21 @ 11:00 AM at [facility]    FILM INFO: XRAY CERVICAL: 02/10/21 @ NANCY          MRI CERVICAL: 21 @ [facility]    SURGERY CONFIRMATION LETTER: E-mailed to patient on 21

## 2021-06-15 NOTE — PROGRESS NOTES
Neurosurgery consultation was requested by:  Pain: Neck   Radicular Pain is present: Right arm pain and burning   Lhermitte sign:   Motor complaints: Some weakness in the right arm/hand   Sensory complaints: Numbness in right hand   Gait and balance issues: None   Bowel or bladder issues: None   Duration of SX is: 3 days most severe   The symptoms are worse with: Any Movement   The symptoms are better with: Nothing   Injury: No   Severity is: Severe   Patient has tried the following conservative measures: None   Ivet Odom CMA,3:29 PM

## 2021-06-15 NOTE — ANESTHESIA CARE TRANSFER NOTE
Last vitals:   Vitals:    02/15/21 1548   BP:    Pulse: (P) 98   Resp:    Temp: (P) 36.5  C (97.7  F)   SpO2:    /88, RR 16, SpO2 99%  Patient's level of consciousness is drowsy  Spontaneous respirations: yes  Maintains airway independently: yes  Dentition unchanged: yes  Oropharynx: oropharynx clear of all foreign objects    QCDR Measures:  ASA# 20 - Surgical No data recorded  PQRS# 430 - Adult PONV Prevention: 4558F - Pt received => 2 anti-emetic agents (different classes) preop & intraop  ASA# 8 - Peds PONV Prevention: NA - Not pediatric patient, not GA or 2 or more risk factors NOT present  PQRS# 424 - Ольга-op Temp Management: 4559F - At least one body temp DOCUMENTED => 35.5C or 95.9F within required timeframe  PQRS# 426 - PACU Transfer Protocol: - Transfer of care checklist used  ASA# 14 - Acute Post-op Pain: ASA14B - Patient did NOT experience pain >= 7 out of 10

## 2021-06-15 NOTE — TELEPHONE ENCOUNTER
Leila called to ask about restrictions after surgery and returning to work timeline. She is scheduled for a C5-6 ACDF on 2/15/21. Please call Leila back at [phone].

## 2021-06-15 NOTE — TELEPHONE ENCOUNTER
"Pt has concerns about pain management and upcomming travel. On 3/6 pt will be traveling to [place] until 3/15. She is requesting that an additional prescription be sent to her pharmacy for more pain medication.     eRx for Oxycodone filled 3/1 for 21 tablets.   [place]MP query completed.  Printed and scanned into chart. No discrepancies.      Pt's medication instructions include: Oxycodone three times a day, flexeril three times a day. Tylenol three times a day. Pt reports that she takes medication around the clock and will run out of oxy and flexeril on day 2 of her trip.     Pt is waking herself up in the middle of the night to take pain meds. Strongly discouraged this. Pt was hesitant stating that she was told to \"stay ahead of her pain\". Clarified that she should only be taking medication when she is awake; and that \"take every 8 hours\" does not mean to set an alarm to wake up to take medication.      Pt is Icing frequently, every hour, using Lidocaine patches but reports they \"don't do anything.\"     Pt states she is still having severe shooting muscle spasms that start in her neck, radiated down into her shoulders/shoulder blades and go down her back. Denies any radiculopathy.     Message routed to DAVID for next steps.     Becky Diallo RN        "

## 2021-06-15 NOTE — ANESTHESIA PROCEDURE NOTES
Arterial Line  Reason for Procedure: hemodynamic monitoring  Patient location during procedure: Pre-op  Start time: 2/15/2021 11:11 AM  End time: 2/15/2021 11:16 AM  Staffing:  Performing  Anesthesiologist: Teresa Calvo MD  Sterile Precautions:  sterile barriers used during insertion: cap, mask, sterile gloves, large sheet, and hand hygiene used.  Arterial Line:     Laterality: left  Location: radial  Prepped with: ChloroPrep    Needle gauge: 20 G  Number of Attempts: 1  Secured with: tape and transparent dressing    1% lidocaine local anesthesia used for skin prep.   See MAR for additional medications given.  Ultrasound evaluation of access site: yes  Vessel patent by US exam    Concurrent real time visualization of needle entry    Permanent ultrasound image captured

## 2021-06-15 NOTE — TELEPHONE ENCOUNTER
Patient sent several messages via [employer] regarding post-op pain in the hospital and reached out wanting help.     Pt reported extreme difficulty with pain management at the hospital and had n/v and migraine last night with 10/10 pain. Hospital staff managed with tylenol and inadequate oxycodone dosing per pt repoprt. Pt was not offered oxycodone on a schedule, the RNs did not approach her overnight for pain medication/assessment.     This morning pt reported that her nausea and migraine are improved. Pain is better controlled and pt is hoping to go home later today.     Pt will be given the patient advocate phone number for [facility] via [employer] to further assist in documenting her experience at [facility].     Becky Diallo RN

## 2021-06-15 NOTE — ANESTHESIA POSTPROCEDURE EVALUATION
Patient: Nora Jay  Procedure(s):  CERVICAL 5-CERVICAL 6 ANTERIOR CERVICAL DECOMPRESSION AND FUSION WITH PLATE  Anesthesia type: general    Patient location: PACU  Last vitals:   Vitals Value Taken Time   /63 02/15/21 1815   Temp 37.2  C (98.9  F) 02/15/21 1815   Pulse 93 02/15/21 1815   Resp 20 02/15/21 1815   SpO2 96 % 02/15/21 1815     Post vital signs: stable  Level of consciousness: awake and responds to simple questions  Post-anesthesia pain: pain controlled  Post-anesthesia nausea and vomiting: no  Pulmonary: unassisted  Cardiovascular: stable  Hydration: adequate  Anesthetic events: no    QCDR Measures:  ASA# 11 - Ольга-op Cardiac Arrest: ASA11B - Patient did NOT experience unanticipated cardiac arrest  ASA# 12 - Ольга-op Mortality Rate: ASA12B - Patient did NOT die  ASA# 13 - PACU Re-Intubation Rate: ASA13B - Patient did NOT require a new airway mgmt  ASA# 10 - Composite Anes Safety: ASA10A - No serious adverse event    Additional Notes:

## 2021-06-15 NOTE — TELEPHONE ENCOUNTER
Called to inform pt that a refill for Percocet has been sent to [facility].     Pt requested that a return to work letter be faxed to [employer] attn: Anibal Robb [phone].     Pt also requested a letter be written stating that it is safe for her to have a massage, excluding her neck; cleared this with NP. Letter written and uploaded to [employer].     Becky Diallo RN

## 2021-06-15 NOTE — PROGRESS NOTES
NEUROSURGERY CONSULTATION NOTE    2/3/2021     Nora Jay is a 45 y.o. female who is sent to us in consultation by Amanda Lin for evaluation of cervical radiculopahy.         CONSULTATION ASSESSMENT AND PLAN:    44 yo female who presents with right arm pain and numbness and tingling. Denies myelopathy symptoms. No myelopath on exam.  MRI showed a right paracentral C5-6 disc extrusion with ventral cord flattening and compression of the exiting C6 nerve. Xray shows mild reversal of the lordotic curve. Patient has not seen significant improvement wit multiple medications. In tremendous pain affecting ADLs. TFESI scheduled for tomorrow. Discussed possible anterior cervical decompression and fusion  if no relief with TFESI. She will call our office if she no significant relief with conservative management.    I spent more than 30 minutes in this apt, examining the pt, reviewing the scans, reviewing notes from chart, discussing treatment options with risks and benefits and coordinating care.     Savannah Mckeon     HPI:  44 yo female who presents with right arm pain and numbness and tingling. This began two days ago when she awoke from sleep with these symptoms. She did not have precipitating events. She went to the ED yesterday and was given Toradol, tizanidine, medrol dose pack, percocet without relief. She is scheduled for TFESI tomorrow.      Pain radiates down her arm diffusely into her hand. This is accompanied by pain. No focal weakness but she is limited by using her hand. Hand feels swollen. No imbalance, bowel or bladder dysfunction left hand fine motor difficulties.       Prior Spine Surgery:no    Past Medical History:   Diagnosis Date     Fibroids      Past Surgical History:   Procedure Laterality Date     ANKLE SURGERY Left     ligaments     HYSTERECTOMY       LA  DELIVERY ONLY      Description:  Section;  Recorded: 2008;     LA LAP,VAG HYST,UTERUS 250GMS/< N/A  10/27/2015    Procedure: LAPAROSCOPIC ASSISTED VAGINAL HYSTERECTOMY BILATERAL SALPINGECTOMY;  Surgeon: Av Lara MD;  Location: [facility];  Service: Gynecology       REVIEW OF SYSTEMS:  ROS reviewed with pt as documented on pt health form of 2/3/2021.     No family hx of anesthetic reactions.  No family hx of hypercoagulability.       MEDICATIONS:  Current Outpatient Medications   Medication Sig Dispense Refill     aspirin-acetaminophen-caffeine (EXCEDRIN MIGRAINE) 250-250-65 mg per tablet Take 1 tablet by mouth every 6 (six) hours as needed for pain.       cyclobenzaprine (FLEXERIL) 10 MG tablet Take 1 tablet (10 mg total) by mouth at bedtime for 10 days. 10 tablet 0     gabapentin (NEURONTIN) 300 MG capsule Take 1 capsule (300 mg total) by mouth 3 (three) times a day. Increase to 3 tablets three times a day as instructed. 270 capsule 1     methylPREDNISolone (MEDROL DOSEPACK) 4 mg tablet Follow package directions 21 tablet 0     naproxen (NAPROSYN) 500 MG tablet Take 1 tablet (500 mg total) by mouth 2 (two) times a day with meals for 14 days. 28 tablet 0     omeprazole (PRILOSEC) 20 MG capsule Take 1 capsule (20 mg total) by mouth daily before breakfast. 14 capsule 0     oxyCODONE-acetaminophen (PERCOCET/ENDOCET) 5-325 mg per tablet Take 1 tablet by mouth every 6 (six) hours as needed for pain. 8 tablet 0     phentermine (ADIPEX-P) 37.5 mg tablet Take 1/2 to 1 tablet in the morning. 90 tablet 1     TiZANidine (ZANAFLEX) 2 MG capsule Take 1-2 capsules (2-4 mg total) by mouth at bedtime as needed for muscle spasms. 90 capsule 1     traMADoL (ULTRAM) 50 mg tablet Take 1 tablet (50 mg total) by mouth 4 (four) times a day for 3 days. Take with Tylenol for improved pain control 12 tablet 0     valACYclovir (VALTREX) 500 MG tablet Take 2 tablets (1,000 mg total) by mouth 2 (two) times a day. For 1 day. Save remaining tabs for subsequent flares 16 tablet 1     No current facility-administered medications  for this visit.          ALLERGIES/SENSITIVITIES:     Allergies   Allergen Reactions     Penicillins Hives     Venom-Honey Bee      Iohexol Rash     Post 10/29/15 CT IV contrast injection pt. Noted rash on stomach at home.  Unknown to what caused this/thought possible IV contrast reaction.       PERTINENT SOCIAL HISTORY:   Social History     Socioeconomic History     Marital status: Single     Spouse name: None     Number of children: 2     Years of education: None     Highest education level: None   Occupational History     Occupation:    Social Needs     Financial resource strain: None     Food insecurity     Worry: None     Inability: None     Transportation needs     Medical: None     Non-medical: None   Tobacco Use     Smoking status: Former Smoker     Smokeless tobacco: Never Used   Substance and Sexual Activity     Alcohol use: No     Drug use: No     Sexual activity: Yes     Partners: Male     Birth control/protection: Surgical     Comment: hysterectomy   Lifestyle     Physical activity     Days per week: None     Minutes per session: None     Stress: None   Relationships     Social connections     Talks on phone: None     Gets together: None     Attends Sabianist service: None     Active member of club or organization: None     Attends meetings of clubs or organizations: None     Relationship status: None     Intimate partner violence     Fear of current or ex partner: None     Emotionally abused: None     Physically abused: None     Forced sexual activity: None   Other Topics Concern     None   Social History Narrative    Lives with her dog. She is a single parent.     Daughter Khadra 22yo and her 19yo son just is off to college.     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     Also coaches gymnastics in the evenings.     Walks 5lb Yorkie dog for exercise. Otherwise walks or runs 3-5 miles a couple times per week. Former smoker. No alcohol.     Amanda  "MD Riley         FAMILY HISTORY:  Family History   Problem Relation Age of Onset     Hypertension Father      Diabetes Father      Colon cancer Maternal Grandmother         PHYSICAL EXAM:   Constitutional: BP (!) 149/100   Pulse 88   Resp 18   Ht 5' 3\" (1.6 m)   Wt 155 lb (70.3 kg)   LMP 10/23/2015   SpO2 97%   BMI 27.46 kg/m       Mental Status: A & O in no acute distress.  Affect is appropriate.  Speech is fluent.  Recent and remote memory are intact.  Attention span and concentration are normal.     Motor:  Normal bulk and tone all muscle groups of upper and lower extremities.      Sensory: Sensation intact bilaterally to light touch.      Coordination:  Heel/toe/  gait intact.   intact  tandem gait      Reflexes; supinator, biceps, triceps, knee/ ankle jerk intact. no hoffmans    IMAGING: I personally reviewed all radiographic images      Cc:   Amanda Lin MD  [address] [facility] [address]  "

## 2021-06-15 NOTE — ANESTHESIA PREPROCEDURE EVALUATION
Anesthesia Evaluation      No history of anesthetic complications     Airway   Mallampati: I  Neck ROM: limited   Pulmonary - negative ROS    breath sounds clear to auscultation  (+) sleep apnea,                          Cardiovascular   Exercise tolerance: > or = 4 METS  (-) hypertension  Rhythm: regular        Neuro/Psych      Comments: C5-6 disc herniation with central stenosis s/f C5-6 ACDF    MRI C/spine - .  Moderate-sized right paracentral C5-C6 disc extrusion with ventral cord flattening and central canal stenosis. This appears to contact the medial aspect of the exiting right C6 nerve.     2.  Cystlike lesion within the left thyroid lobe. Dedicated thyroid ultrasound recommended for further evaluation. (Outpatient).    Sx include 90% numbness of R thumb/2nd finger in C6 distribution.     Endo/Other - negative ROS   (-) hypothyroidism, hyperthyroidism     Comments: Thyroid nodule on the L     GI/Hepatic/Renal - negative ROS   (-) GERD     Other findings: Covid negative 2/11/21      Dental - normal exam                        Anesthesia Plan  Planned anesthetic: general endotracheal  GETA - glidescope for neutral intubation  IONM - propofol/remifentanil gtts  Ketamine 35mg post-induction (no repeat) + magnesium gtt for post op pain  Decadron 10, Zofran 4 for antiemesis   Pre-induction arterial line, MAP goal > 85mmHg per surgeon request  2 PIV given quantity of intraop infusions   ASA 2   Induction: intravenous   Anesthetic plan and risks discussed with: patient and child/children  Anesthesia plan special considerations: antiemetics,   Post-op plan: routine recovery

## 2021-06-15 NOTE — PATIENT INSTRUCTIONS - HE
Flex/Ex Xrays   C5-6 ACDF     Provided complete information about approaching surgery.  Discussed discharge planning and expected outcomes after surgery as well as follow-ups and restrictions.  Emphasized on stop taking ASA, NSAID's, vitamins and /or OTC herbal supplements within 10 days and any anticoagulant meds within 7 days prior to surgery.  Reminded patient to bring all pertinent films to hospital the day of surgery.    NPO after midnight, Please arrive 2.5 hours prior to scheduled surgery.    Using a washcloth and a bottle of provided Hibiclens, wash your body, avoiding your face and genitals. Preferably, shower the night before surgery and the morning of surgery using a half a bottle each time for your whole body shower. If you are unable to take a shower in the morning of surgery, please discuss your options with the nurse at your readiness visit.     Provided written pamphlets about surgery and Hibiclens bottle.  Answered all questions.  The  will call patient with all pre-op orders and instructions.  Patient to complete all required diagnostic tests prior to surgery.  If test are not completed this will cancel the surgery; contact the clinic nurses at [phone] if unable to complete test.

## 2021-06-15 NOTE — TELEPHONE ENCOUNTER
Nora reports almost a persistent C6 pain on the right associated with numbness. Denies arm weakness. Has had a short term pain relief after Right C5--6 TFESI on 02/4/2021. She would like to see Dr. Mike SIMPSON for discussion of proposed ACDF at C5-6 as the next step of her treatment.   Elif Jones RN, CNRN

## 2021-06-15 NOTE — TELEPHONE ENCOUNTER
"Nora Jay is status post  ACDF at C6-7 on 02/15/2021 with Dr. Mckeon.  Today she reports ongoing moderate to severe pain in between shoulder blades, shoulders and posterior neck. Denies sensory or motor disturbance in UE. Takes Dilaudid which makes her feel \"goofy and nauseated\". Would like to try a different pain med. Out of Valium. Uses ice packs.  eRx Lidocaine patches, Robaxin, Zofran.   F/u on 02/21/2021 for wound check.    MNPM/P query completed.  Printed and scanned into chart.   Elif Jones, RN, CNRN    "

## 2021-06-15 NOTE — TELEPHONE ENCOUNTER
Patient contacted and informed that Dr. Espinoza was successful in obtaining a PA for the patient's cervical epidural steroid injection from her insurance company and she could move forward with scheduling the procedure.      The patient was in agreement with the plan and was interested in moving forward with scheduling the procedure.  She was added to Dr. Henderson's schedule today (2/4/21).      Procedure requirements were reviewed to the patient's verbalized understanding.      She was encouraged to contact the [facility] if she had any questions or concerns prior to her appointment later this afternoon.

## 2021-06-15 NOTE — TELEPHONE ENCOUNTER
Called patient, discussed surgery, post-op course, expectations, follow up plan.    Reviewed H&P from 02/11/2021 - cleared for surgery  Labs - WNL    MRI done on 02/02/2021 - in [place]    To OR as planned.     Check in - 0930    Nothing to eat or drink after midnight the night before surgery.     Reviewed with patient: Arrive 2.5 -3 hours prior to scheduled surgery.    Bring all pertinent films to hospital the day of surgery.     Continue to refrain from NSAIDS (Ibuprofen, Aleve, Naprosyn), ASA, Over the counter herbal medications or supplements, anti-coagulants and blood thinners.     Patient confirmed they have help/assistance in place at home upon discharge    Instructions: using a washcloth and a bottle of provided Hibiclens, wash your body, avoiding your face and genitals. Preferably, shower the night before surgery and the morning of surgery using a half a bottle each time for your whole body shower.        Patient was informed that we will provide up to 1 week prescription of pain medication for post-operative pain.      Instructed patient about the following: After your surgery, if you will be staying in-patient, a nursing provider team will be monitoring you closely throughout your stay and communicate your health status to your surgeon and other providers.  You will be seen by Advanced Practice Providers (e.g., nurse practitioners, clinic nurse specialist, and physician assistants) who will check on you regularly to assess the status of your surgery.     Elif Jones RN, CNRN

## 2021-06-15 NOTE — TELEPHONE ENCOUNTER
Patient calling with report of C6 radiculopathy. Pain no longer constant but does get severe twinges of pain that shoot into her arm that are excruciating. She is two days post injection with Dr Naidu. She saw Dr Mckeon last week and may need fusion. Tramadol refilled. Short supply. No report of weakness. MN  reviewed. MRI reviewed - C5-6 disc extrusion with ventral cord flattening and central canal stenosis. She will follow up next week.     MICHELLE Myles-CNP  [facility]  O: [phone]

## 2021-06-16 PROBLEM — M48.02 CERVICAL STENOSIS OF SPINAL CANAL: Status: ACTIVE | Noted: 2021-02-15

## 2021-06-16 PROBLEM — M54.12 CERVICAL RADICULOPATHY: Status: ACTIVE | Noted: 2021-02-10

## 2021-06-16 PROBLEM — M48.02 CERVICAL STENOSIS OF SPINE: Status: ACTIVE | Noted: 2021-02-15

## 2021-06-16 NOTE — TELEPHONE ENCOUNTER
Pt requesting a medication to help with anxiety about her thyroid biopsy tomorrow.  Pt states she is having a lot of anxiety about the procedure tomorrow and is hoping for a medication to help with that.      Ok to leave detailed message.      Becky Sesay

## 2021-06-16 NOTE — TELEPHONE ENCOUNTER
Will send Rx for #2 tabs valium which she has had in the past- ok to take one tonight for sleep and 1/2 tab or full tab about 30min before procedure if needed.  Thanks,   Dr. Lin

## 2021-06-16 NOTE — PATIENT INSTRUCTIONS - HE
Patient has been advised to remain out of work while working with physical therapy to focus on her cervical mobility and strength. She has also been advised to being to increase her weight restrictions by 5 pounds per week until she is back to her normal amount. Follow up has otherwise been requested in 6 weeks with repeat cervical xray at that time. She understands to contact the office sooner should any symptoms return or worsen.

## 2021-06-16 NOTE — PROGRESS NOTES
NEUROSURGERY FOLLOW UP EVALUATION:  The patient's attending neurosurgeon is Dr. Mckeon    Assessment:   Postoperative C5-C6 anterior cervical discectomy and fusion on 2/15/2021    Plan:   Patient has been advised to remain out of work while working with physical therapy to focus on her cervical mobility and strength. She has also been advised to being to increase her weight restrictions by 5 pounds per week until she is back to her normal amount. Follow up has otherwise been requested in 6 weeks with repeat cervical xray at that time. She understands to contact the office sooner should any symptoms return or worsen.     HPI:   Leila is a pleasant 46 year old right handed female, who presents today postoperative C5-C6 anterior cervical discectomy and fusion on 2/15/2021 by Dr. Mckeon. She states that she is doing better than prior to surgery but notes continued posterior cervical pain of which is a constant throbbing pain that will increase if she feels that she over does it. She states that her severe right radicular upper extremity pain has resolved but notes continued C6 radicular numbness that remains unchanged.     Exam:   Heart Rate:  [78] 78  BP: (123)/(84) 123/84  SpO2:  [98 %] 98 %    Alert and oriented x3, speech normal  PERRL, EOMI, face symmetric, tongue midline, shoulder shrug equal   CN II-XII intact  Coordination: no pronator drift, finger to nose smooth and accurate bilaterally   Motor Strength:   Deltoids: 5/5 right, 5/5 left  Biceps: 5/5 right, 5/5 left   Triceps: 5/5 right, 5/5 left   Wrist extensors:5/5 right, 5/5 left  Finger flexion: 5/5 right, 5/5 left   Finger abduction:5/5 right, 5/5 left   Hand : 5/5 right, 5/5 left   Hip flexors: 5/5 right, 5/5 left   Quadriceps: 5/5 right, 5/5 left  Ankle dorsiflexion: 5/5 right, 5/5 left    Ankle plantar flexion:5/5 right, 5/5 left   Extensor hallicus longus: 5/5 right, 5/5 left   Bulk and tone: normal  Sensation: decreased right C6 distribution    Gait: normal  Incision: well healed without erythema, induration, or drainage, small amount of ecchymosis noted secondary to recent thyroid biopsy     Imaging:   The images were personally reviewed   Cervical xray reviewed with good hardware placement noted stable cervical alignment     Ivone Rasmussen PA-C  [facility]surgery   O: [phone]

## 2021-06-17 NOTE — PROGRESS NOTES
Patient is here for a post op visit. She states that she is doing much better than prior to surgery. However she is having frequent muscle spasms. She has a hard time sleeping at night. She is currently doing PT, which she states that it is going well.   Ivet Odom,[facility],10:44 AM

## 2021-06-17 NOTE — PROGRESS NOTES
NEUROSURGERY FOLLOW UP EVALUATION:    ASSESSMENT  Nora Jay is a 46 y.o. female, who presents today status post CERVICAL 5-CERVICAL 6 ANTERIOR CERVICAL DECOMPRESSION AND FUSION WITH PLATE with Dr Mckeon 2/15/2021. XR today reviewed and shown to patient. Compared to prior's. No concerns.     Today she reports annoying ache posterior neck. Most annoying at the end of the day. Stiff in the morning. Difficulty sleeping due to discomfort. Twinges of pain in her neck with certain movements. Her pain/discomfort is much improved since before surgery. Feels she is still getting used to swallowing but does not feel she chokes, coughs with food/fluids. Numbness right thumb present before surgery and is still there but she did respond to test PT did with her so she is hopeful its improving. We discussed this may take up to 12-18 months. Right bicep altered sensation also still present and was pre-op. She is working from home, doing computer work and tolerating that well. 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 Will revisit at her next follow up but sooner if she feels she is ready. She did not advance her weight restriction after her last visit. She can/should do so now. She has been working with therapy and feels stronger and feels its helping. Recommend continuing as long as she feels its beneficial. She has been taking intermittent tylenol and methocarbamol at bedtime which she is not sure helps. Will give one time Rx for flexeril to take 1/2 tablet to 1 tab of 10 mg at bedtime. Recommend considering massage and acupuncture.     PLAN:   1. Return in 3 months with XR  2. Continue abstaining from working with gymnasts. If prior to your next visit you are feeling great and want to go back, call us and we can discuss.   3. Continue physical therapy   4. Add 5lbs of weight per week until back to normal   5.  Avoid NSAIDS until 6 months post op  6. Increase activity as you tolerate   7. Take tylenol regularly on a daily basis. Flexeril sent to your pharmacy. 5-10 mg at bedtime as needed  8. Consider massage, acupuncture.       HPI: Presented with right arm pain, numbness and tingling. MRI with right paracentral C5-6 disc extrusion with ventral cord flattening and moderate spinal canal stenosis and compression of C6 nerve. XR with mild reversal of lordotic curve near C5-6. No relief with medications or injections. Reacts poorly to gabapentin.     PHYSICAL EXAM:  Heart Rate:  [96] 96  Resp:  [16] 16  BP: (126)/(74) 126/74  SpO2:  [97 %] 97 %    Alert and oriented x3, speech normal   PERRL, EOMI, face symmetric, tongue midline, shoulder shrug equal  Arm strength: 5/5  Leg strength: 5/5   Bulk and tone: normal   Coordination/Gait : normal   Incision: healed     IMAGING:  The imaging was personally reviewed by me and shared with patient.     MICHELLE Myles-CNP  [facility]surgery  O: [phone]

## 2021-06-17 NOTE — PATIENT INSTRUCTIONS - HE
Patient Instructions by Trace Jett MD at 7/14/2019 11:40 AM     Author: Trace Jett MD Service: -- Author Type: Physician    Filed: 7/14/2019  3:23 PM Encounter Date: 7/14/2019 Status: Addendum    : Trace Jett MD (Physician)    Related Notes: Original Note by Trace Jett MD (Physician) filed at 7/14/2019  3:22 PM       - Your chest x-ray shows left-sided pneumonia.   - Your D-Dimer is negative, effectively ruling out pulmonary embolism (blood clots in your lungs) as the cause of your chest symptoms.   - Your blood cell counts are normal.   - You have tested negative for Mono and Influenza.   - Your electrolytes, kidney function tests, and liver function tests are normal.   - Testing for Lyme Disease is in process.   - Inflammatory markers are in process.   - Urinalysis is not indicative of bladder infection.   - Take the full course of doxycycline as prescribed.   - Take a probiotic while taking this antibiotic. This can be purchased over the counter at your local pharmacy.   - Take acetaminophen 1000 mg every 6 hours as needed and / or ibuprofen 600 mg every 6 hours as needed for fever and pain.   - Follow up with Primary Care within 48 hours to review your symptoms and laboratory results.       Patient Education     When You Have Pneumonia  You have been diagnosed with pneumonia. This is a serious lung infection. Most cases of pneumonia are caused by bacteria. Pneumonia most often occurs in older adults, young children, and people with chronic health problems.  Home care    Take your medicine exactly as directed. Dont skip doses. Continue taking your antibiotics as until they are all gone, even if you start to feel better. This will prevent the pneumonia from coming back.    Drink at least 8 glasses of water daily, unless directed otherwise. This helps to loosen and thin secretions so that you can cough them up.    Use a cool-mist humidifier in your bedroom. Be sure to clean  the humidifier daily.    Dont use medicines to suppress your cough unless your cough is dry, painful, or interferes with your sleep. Coughing up mucus is normal. You may use an expectorant if your healthcare provider says its okay.    You can use warm compresses or a heating pad on the lowest setting to relieve chest discomfort. Use several times a day for 15-20 minutes at a time. To prevent injury to your skin, set the temperature to warm, not hot. Dont put the compress or pad directly on your skin. Make certain it has a cover or wrap it in a towel. This is to prevent skin burns.    Get plenty of rest until your fever, shortness of breath, and chest pain go away.    Plan to get a flu shot every year. The flu is a common cause of pneumonia. Getting a flu shot every year can help prevent both the flu and pneumonia.  Getting the pneumococcal vaccine  Talk with your healthcare provider about getting the pneumococcal vaccine. Pneumococcal pneumonia is caused by bacteria that spread from person to person. It can cause minor problems, such as ear infections. But it can also turn into life-threatening illnesses of the lungs (pneumonia), the covering of the brain and spinal cord (meningitis), and the blood (bacteremia).  Children under 2 years of age, adults over age 65, people with certain health conditions, and smokers are at the highest risk of pneumococcal disease. This vaccine can help prevent pneumococcal disease in both adults and children. Some people should not have the vaccine. Make sure to ask your healthcare provider if you should have the vaccine.   Follow-up care  Make a follow-up appointment as directed by our staff.  When to call your healthcare provider  Call your healthcare provider right away if you have any of the following:    Fever of 100.4 F (38 C) or higher, or as directed by your healthcare provider    Mucus from the lungs (sputum) thats yellow, green, bloody, or smells bad    A large amount of  sputum    Vomiting    Symptoms that get worse  Call 911  Call 911 right away if you have any of the following:    Chest pain    Trouble breathing    Blue lips or fingernails   Date Last Reviewed: 11/1/2016 2000-2017 [employer]. [address] [place] [address], [place] [address]. All rights reserved. This information is not intended as a substitute for professional medical care. Always follow your healthcare professional's instructions.

## 2021-06-17 NOTE — PATIENT INSTRUCTIONS - HE
1. Return in 3 months with XR  2. Continue abstaining from working with gymnasts. If prior to your next visit you are feeling great and want to go back, call us and we can discuss.   3. Continue physical therapy   4. Add 5lbs of weight per week until back to normal   5. Avoid NSAIDS until 6 months post op  6. Increase activity as you tolerate   7. Take tylenol regularly on a daily basis. Flexeril sent to your pharmacy. 5-10 mg at bedtime as needed  8. Consider massage, acupuncture.

## 2021-06-18 NOTE — PROGRESS NOTES
Assessment/Plan:        Diagnoses and all orders for this visit:    Sore throat  -     Rapid Strep A Screen-Throat  -     Group A Strep, RNA Direct Detection, Throat    Cough    Rash    Other orders  -     azithromycin (ZITHROMAX Z-LEANNA) 250 MG tablet; Take 2 tablets (500 mg) on  Day 1,  followed by 1 tablet (250 mg) once daily on Days 2 through 5.  Dispense: 6 tablet; Refill: 0        Rapid strep was done which came back negative.  Will send that for culture.  The rash could be related to infection.  Continue with the Benadryl.  Was advised to monitor symptoms and if it does get worse over the next couple of days, to start Z-Leanna.  Prescription was sent to her pharmacy.  Aware of risks of antibiotic use as well as resistance patterns.  Would like to start medication now.  Encouraged to monitor for a few more days to see if it would change.  Precautionary measures, fluid hydration.  Tylenol around-the-clock for the next couple of days for body aches, fever.  Recheck with PCP in a week if symptoms are persistent or worse.  She was agreeable with the plans.  Subjective:    Patient ID: Nora Jay is a 43 y.o. female.    HPI    Nora is here with concerns about fever, sore throat.  She started having sore throat, fever T-max of 101, body aches, joint pains, headaches 3 days ago.  She also has runny nose, postnasal drainage, cough productive of clear to yellowish secretions.  She started having a rash in her upper arms 2 days ago, itching.  No difficulty with swallowing or vomiting, hemoptysis.  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  She also was in the hospital frequently to visit her boyfriend's mom who passed away last week, stroke.  She has been taking Advil every 6 hours for her fever and has sweating.  She feels tight in her chest especially with breathing.  Occasional dizziness.  Denies any syncopal events.  Takes vitamin C, D.  She quit smoking a couple of months ago, 3  cigarettes per day for around 10 years.  She did smoke once last week because of for boyfriend's mother passing away.  She also notes joint aches and pains in just does not feel right.  Knows her body and feels that this is not typical.  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    Denies any new skin products associated with her rash.  No changes in her diet.  No exposure to anyone with skin infection.  No recent travel.  Rash is itchy.  Tried Benadryl.    Review of Systems  As above otherwise negative.          Objective:    Physical Exam  /70 (Patient Site: Left Arm, Patient Position: Sitting, Cuff Size: Adult Regular)  Pulse (!) 113  Temp 98.7  F (37.1  C) (Oral)  Comment: OTC Advil about 1 hour ago.  Wt 164 lb 14.4 oz (74.8 kg)  LMP 10/23/2015  SpO2 98%  BMI 29.21 kg/m2    Vital signs noted above. AAO ×3.  HEENT no nasal discharge, moist oral mucosa. Ears:TMs intact, no fluid collection. Neck: Supple neck, palpable cervical lymph nodes.  Lungs: Clear to auscultation bilateral.  Heart: S1-S2 initially tachycardic but better during examination, regular rate and rhythm, no murmurs were noted.  Abdomen:  with bowel sounds.  Extremities: pulses were full and equal.  Skin: Slightly pinkish macular lesions in her bilateral arms.  No discharge, skin breaks, scaliness.

## 2021-06-18 NOTE — PATIENT INSTRUCTIONS - HE
Patient Instructions by Vinh Clemons PA-C at 2/2/2021  4:30 PM     Author: Vinh Clemons PA-C Service: -- Author Type: Physician Assistant    Filed: 2/2/2021  5:18 PM Encounter Date: 2/2/2021 Status: Addendum    : Vinh Clemosn PA-C (Physician Assistant)    Related Notes: Original Note by Vinh Clemons PA-C (Physician Assistant) filed at 2/2/2021  5:17 PM       For the first 2 days ice the area 20 minutes on each hour while awake avoiding and taking precautions to damage the skin  Do not fall asleep with ice on the skin.  Over-the-counter ibuprofen 600 mg 3 times a day with food for analgesia and anti-inflammatory effect as long as there is no contraindication to taking the medications.  General risks and benefits of the medication were gone over.  Do not start the ibuprofen until tomorrow.   Take muscle relaxer and pain medication at nighttime.  Do not take during the daytime as he may become impaired and sleepy.  Do not take with alcohol.  Do not operate machinery, watch small children or do other attentional activities where you may hurt yourself or somebody else.  Follow-up with neurosurgery for evaluation and treatment, returning to the emergency room in the interim if pain or other complication or new symptoms arise in the interim.       Patient Education     Understanding Cervical Radiculopathy    Cervical radiculopathy is irritation or inflammation of a nerve root in the neck. It causes neck pain and other symptoms that may spread into the chest or down the arm. To understand this condition, it helps to understand the parts of the spine:    Vertebrae. These are bones that stack to form the spine. The cervical spine contains the 7 vertebrae in the neck.    Disks. These are soft pads of tissue between the vertebrae. They act as shock absorbers for the spine.    The spinal canal. This is a tunnel formed within the stacked vertebrae. The spinal cord runs through this canal.    Nerves. These branch off the  spinal cord. As they leave the spinal canal, nerves pass through openings between the vertebrae. The nerve root is the part of the nerve that is closest to the spinal cord.   With cervical radiculopathy, nerve roots in the neck become irritated. This leads to pain and symptoms that can travel to the nerves that go from the spinal cord down the arms and into the torso.  What causes cervical radiculopathy?  Aging, injury, poor posture, and other issues can lead to problems in the neck. These problems may then irritate nerve roots. These include:    Damage to a disk in the cervical spine. The damaged disk may then press on nearby nerve roots.    Degeneration from wear and tear, and aging. This can lead to narrowing (stenosis) of the openings between the vertebrae. The narrowed openings press on nerve roots as they leave the spinal canal.    An unstable spine. This is when a vertebra slips forward. It can then press on a nerve root.  There are other, less common causes of pressure on nerves in the neck. These include infection, cysts, and tumors.  Symptoms of cervical radiculopathy  These include:    Neck pain    Pain, numbness, tingling, or weakness that travels down the arm    Loss of neck movement    Muscle spasms  Treatment for cervical radiculopathy  In most cases, your healthcare provider will first try treatments that help relieve symptoms. These may include:    Prescription or over-the-counter pain medicines. These help relieve pain and swelling.    Cold packs. These help reduce pain.    Resting. This involves avoiding positions and activities that increase pain.    Neck brace (cervical collar). This can help relieve inflammation and pain.    Physical therapy, including exercises and stretches. This can help decrease pain and increase movement and function.    Shots of medicinesaround the nerve roots. This is done to help relieve symptoms for a time.  In some cases, your healthcare provider may advise surgery to  fix the underlying problem. This depends on the cause, the symptoms, and how long the pain has lasted.  Possible complications  Over time, an irritated and inflamed nerve may become damaged. This may lead to long-lasting (permanent) numbness or weakness. If symptoms change suddenly or get worse, be sure to let your healthcare provider know.     When to call your healthcare provider  Call your healthcare provider right away if you have any of these:    New pain or pain that gets worse    New or increasing weakness, numbness, or tingling in your arm or hand    Bowel or bladder changes   Date Last Reviewed: 3/10/2016    9966-8404 [employer]. [address] [place], [place] [address]. All rights reserved. This information is not intended as a substitute for professional medical care. Always follow your healthcare professional's instructions.           Patient Education     Pinched Nerve in the Neck  A pinched nerve in the neck (cervical radiculopathy) is caused when the nerve that goes from the spinal cord to the neck or arm is irritated or has pressure on it. This may be caused by a bulging spinal disk. A spinal disk is the cushion between each spinal bone. Or it may be caused by a narrowing of the spinal joint because of osteoarthritis and wear and tear from repeated injuries.  A pinched nerve can cause numbness, tingling, deep aching, or electrical shooting pain from the side of the neck all the way down to the fingers on one side.  A pinched nerve may start after a sudden turning or bending force (such as in a car accident) or after a simple awkward movement. In either case, muscle spasm is commonly present and adds to the pain.  Home care  Follow these guidelines when caring for yourself at home:    Rest and relax the muscles. Use a comfortable pillow that supports your head and keeps your spine in a natural (neutral) position. Your head shouldnt be tilted forward or backward. A rolled-up towel may  help for a custom fit. When standing or sitting, keep your neck in line with your body. Keep your head up and shoulders down. Stay away from activities that require you to move your neck a lot.    You can use heat and massage to help ease the pain. Take a hot shower or bath, or use a heating pad. You can also use a cold pack for relief. You can make a cold pack by wrapping a plastic bag of crushed or cubed ice in a thin towel. Try both heat and cold, and use the method that feels best. Do this for 20 minutes several times a day.    You may use acetaminophen or ibuprofen to control pain, unless another pain medicine was prescribed. If you have chronic liver or kidney disease, talk with your healthcare provider before using these medicines. Also talk with your provider if youve had a stomach ulcer or gastrointestinal bleeding.    Reduce stress. Stress can make it longer for your pain to go away.    Do any exercises or stretches that were given to you as part of your discharge plan.    Wear a soft collar, if prescribed.    Physical therapy and massages are known to help.    You may need surgery for a more serious injury.  Follow-up care  Follow up with your healthcare provider, or as advised, if you dont start to get better after 1 week. You may need more tests. Tell your provider about any fever, chills, or weight loss.  If X-rays were taken, a radiologist may look at them. You will be told of any new findings that may affect your care.  When to seek medical advice  Call your healthcare provider right away if any of these occur:    Pain becomes worse even after taking prescribed pain medicine    Weakness in the arm or legs    Numbness in the arm gets worse    Trouble breathing or swallowing  Date Last Reviewed: 5/1/2017 2000-2017 [employer]. [address] [place] [address], [place] [address]. All rights reserved. This information is not intended as a substitute for professional medical care. Always follow  your healthcare professional's instructions.

## 2021-06-19 NOTE — LETTER
Letter by Jony Horne MD at      Author: Jony Horne MD Service: -- Author Type: --    Filed:  Encounter Date: 5/17/2019 Status: (Other)         Nora Jay  [address] [facility] [id number]             May 17, 2019         Dear Ms. Jay,    Below are the results from your recent visit: The official radiology interpretation is also no fracture, they do note some swelling consistent with a sprain.    Resulted Orders   XR Ankle Left 3 or More VWS    Narrative    EXAM: XR ANKLE LEFT 3 OR MORE VWS  LOCATION: [facility]  DATE/TIME: 5/17/2019 9:02 AM    INDICATION: Patient with prior left ankle surgery, twisted ankle yesterday, lateral ankle swelling and pain  COMPARISON: 9/21/2014    FINDINGS: There is moderate soft tissue swelling over the lateral malleolus. Ankle mortise is intact. No acute fracture.    Interval placement of anchoring screws within the distal fibular tip. Tiny plantar calcaneal spur.            Please call with questions or contact us using [employer].    Sincerely,        Electronically signed by Jony Horne MD

## 2021-06-19 NOTE — PROGRESS NOTES
Non-surgical Weight Loss Initial Diet Evaluation     Assessment:  Pt is a 43 y.o. female being seen today for non-surgical RD nutritional evaluation. Today we reviewed current eating habits and level of physical activity, and instructed on the changes that are required for successful weight loss outcomes.    Pt desires weight loss d/t being at her highest weight and feeling fatigued. Pt has history of using phentermine and self restricted eating patterns to get to weight of 148 lb for a spring vacation with daughter, however, regained due to busy lifestyle. Pt is a single mother who works two jobs, one being a  in the evening.     Personal Goals: weight loss, improved energy.   Personal goal weight: 140 lb      Pt Active Problem List Diagnosis:     Patient Active Problem List   Diagnosis     Herpes Simplex Type I     Migraine Headache     Wrist Sprain     Foot Pain (Soft Tissue)     Ankle Sprain     Upper Back Pain (Between Shoulder Blades)     Fibroids, subserous       Phentermine: Currently not taking, but would like prescription.     Pt's Initial Weight: 170.1 lbs  Weight: 170 lb 1.6 oz (77.2 kg)  Weight loss from initial: 0  % Weight loss: 0 %  BMI: Body mass index is 30.13 kg/(m^2).  IBW: 115 lbs    Estimated RMR ([place]-St Jeor equation): 1398 calories  Protein requirements (.5grams to .9grams per pound IBW, 20-30% of calories, minimum of 60-80gm per day):   grams     Food allergies, intolerances, Jehovah's witness customs: none. Pt dislikes onions.     Vitamins/Mineral Supplementation: none     Biggest struggle with weight loss: busy schedule     Who does the grocery shopping for your household? Pt   Who prepares your meals at home? Pt     Diet Recall/Time: pt wakes up 6:00am   Breakfast: 9:00am- yogurt, granola, banana (10g)   Am Snack: none   Lunch: deli delivery pasta, salad (10g)   * 4:30-5pm drive from 1st to 2nd job  Dinner: none   HS Snack: 9:30 pm- snacking late of night. Leftover  dinner, grazing, etc.   11pm goes to bed    Protein: 20-30 grams    Fats used at home: olive oil    Fried Foods: 2-3 times per week    Meals per week away from home: 3-4x/week   Sit down: none   Fast Food: 3-4x/week   Take Out: none   Delivery: none  Buffet: none   Cafeteria: none   Specialty Coffee: stopped going to [employer] daily   Ice Cream/FrozenYogurt/Bakery: none   Comments: none     Recommended limiting eating out to no more than 2x/week.  Patient and I reviewed the importance of eating three consistent meals per day; as well as meal timing to be spaced 4-5 hours apart.  Snack choices: 100-150 calories (1-2x/day if physically hungry), incorporating a fruit/vegetable w/ protein source.    Portion Sizes problematic? yes per patient/diet recall  Encouraged slowing meal times down, 20-30 minutes, chewing to applesauce consistency.   To aid in proper portion control and slow meal time down discussed consuming meals off smaller plates, use toddler/children utensils and set utensils down after each bite.    Protein, vegetables/fruits, carbohydrates:   Reviewed lean protein sources today. Recommended consuming 20-30 gm protein at 3 meals daily.  The patient and I discussed the importance of including lean/low fat protein at each meal and limiting carbohydrate intake to less than 25% of plate volume.     Beverages (Type/Oz. per day)  Water: 25 oz   Coffee: 1 mug   Tea: ice tea raspberry   Milk: none   Regular soda: none   Diet soda: diet coke periodically   Juice: none   Miguel-Aid/lemonade/etc: none   Alcohol: socially     Discussed the importance of adequate hydration and the goal of 64+ oz of fluid daily.   The patient understands the importance of avoiding all alcoholic and sweetened drinks, and instead choosing 64 oz plain water.    Exercise  Pt coaches gymnastics 5 days a week.     Pt's understands that 45-60 minutes of daily activity is an important part of weight loss success.   Encouraged pt to incorporate upper  body strength training exercise, even if its lifting soup cans while watching tv at night, doing push ups/sit-ups, and abdominal work.    PES statement:    1. (NI-1.3)Excessive energy intake related to Food and nutrition related knowledge deficit concerning excessive energy/oral intake as evidenced by Intake of high caloric density foods at meals and/or snacks; large portions; frequent grazing; Estimated intake that exceeds estimated daily energy intake; Binge eating patterns; Frequent excessive fast food or restaurant intake; and BMI 30.       Intervention  Discussion:  1. Educated pt on benefit developing consistent eating habits, versus the mindset of intense dietary changes.   2. Recommended to consume 20-30 gm protein at 3 meals daily. 60-80 grams daily total.  3. Discussed using a protein supplement as a meal replacement for dinner.   4. Educated pt on food labels: keeping total fat grams <10, total sugar grams <10, fiber >3gm per serving.   5. Reviewed Plate Method and gave food journal homework.  6. Gave food journal homework, to be completed for f/u appointment.  7. Plate Method: The patient and I discussed the importance of including lean/low  fat protein at each meal and limiting carbohydrate intake to less  than 25% of plate volume.    Instructions/Goals:   1. Include 20-30 gm protein at each meal.  2. Increase vegetable/fruit intake, by having a vegetable or fruit with each meal daily. Recommended pt to increase vegetable/fruit intake to 4-5 servings daily.  3. Increase fluid intake to 64oz daily: choose plain or calorie/alcohol-free beverages.  4. Incorporate daily structured activity, 45-60 minutes most days of the week  5. Read food labels more consistently: keeping total fat grams <10, total sugar grams <10, fiber >3gm per serving.  6. Practice plate method: 1/2 plate lean/low fat protein source, vegetable/fruit, <25% of plate complex carbohydrates.  7. Practice eating off of smaller plates/bowls,  chewing to applesauce consistency, taking 20-30 minutes to eat in a calm/relaxed environment without distractions of tv/email/cell phone.    Goals set by patient:  1. Reduce ordering lunch to 2x a week  2. Try proteinsupplement  3. Work on meal planning by using provided handout.     Handouts Provided:  Plate Method  Food journal  Lean Protein sources  Food Label  Protein Supplement List      Monitor/Evaluation:    Pt will f/u in one month with RD.    Plan for next visit with RD:  Review goals  Discuss probiotic health  Hydration needs  Review carbohydrates/fiber  Exercise      Time In: 9:00am  Time Out: 10:00am      ABN signed: Yes

## 2021-06-19 NOTE — LETTER
Letter by Yolande Samuel FNP at      Author: Yolande Samuel FNP Service: -- Author Type: --    Filed:  Encounter Date: 6/26/2019 Status: (Other)         June 26, 2019     Patient: Nora Jay   YOB: 1975   Date of Visit: 6/26/2019       To Whom It May Concern:    Nora Jay was seen in my clinic on 6/26/2019. It is my medical opinion that Nora Jay may return to her regular duty with the following restrictions. Not standing more than one hour at a stretch. Accommodate a half an hour sitting break and left leg elevation after every hour of standing. Plan to be re-evaluated in two weeks by orthopedic specialist.     If you have any questions or concerns, please don't hesitate to call.    Sincerely,        Electronically signed by JAVI Scott

## 2021-06-19 NOTE — LETTER
Letter by Trace Jett MD at      Author: Trace Jett MD Service: -- Author Type: --    Filed:  Encounter Date: 7/14/2019 Status: (Other)         July 14, 2019     Patient: Nora Jay   YOB: 1975   Date of Visit: 7/14/2019       To Whom it May Concern:    Nora Jay was seen in my clinic on 7/14/2019. Please excuse her absence from work on Monday 7/15/2019 and Tuesday 7/16/2019 due to illness.     If you have any questions or concerns, please don't hesitate to call.    Sincerely,         Electronically signed by Tarce Jett MD

## 2021-06-21 NOTE — LETTER
Letter by Savannah Mckeon MD at      Author: Savannah Mckeon MD Service: -- Author Type: --    Filed:  Encounter Date: 2/22/2021 Status: (Other)         February 22, 2021     Patient: Nora Jay   YOB: 1975   Date of Visit: 2/22/2021       To Whom It May Concern:    Nora Jay has undergone neck surgery on 02/15/2021.    It is my medical opinion that Nora Jay should remain out of work until further notice  .    If you have any questions or concerns, please don't hesitate to call.    Sincerely,        Electronically signed by Savannah Mckeon MD

## 2021-06-21 NOTE — LETTER
Letter by Savannah Mckeon MD at      Author: Savannah Mckeon MD Service: -- Author Type: --    Filed:  Encounter Date: 3/5/2021 Status: (Other)         March 5, 2021     Patient: Nora Jay   YOB: 1975   Date of Visit: 3/5/2021       To Whom It May Concern:    It is my medical opinion that Nora Jay may have a massage, excluding any work on her neck. Discontinue massage if pain occurs.     If you have any questions or concerns, please don't hesitate to call.    Sincerely,        Electronically signed by Savannah Mckeon MD

## 2021-06-21 NOTE — LETTER
Letter by Savannah Mckeon MD at      Author: Savannah Mckeon MD Service: -- Author Type: --    Filed:  Encounter Date: 3/5/2021 Status: (Other)         March 5, 2021     Patient: Nora Jay   YOB: 1975   Date of Visit: 3/5/2021       To Whom It May Concern:    It is my medical opinion that Nora Jay should remain out of work until 3/30/2021 as she continues to recover from surgery. Her ability to return to work will be assessed on this date.     If you have any questions or concerns, please don't hesitate to call.    Sincerely,        Electronically signed by Savannah Mckeon MD

## 2021-06-21 NOTE — LETTER
Letter by Savannah Mckeon MD at      Author: Savannah Mckeon MD Service: -- Author Type: --    Filed:  Encounter Date: 4/9/2021 Status: (Other)         April 9, 2021     Patient: Nora Jay   YOB: 1975   Date of Visit: 4/9/2021       To Whom It May Concern:    It is my medical opinion that Nora Jay may return to work on 4/12/2021 with the following restrictions: no lifting, bending, twisting, pushing, or pulling greater than 5-10 pounds. No overhead reaching. These restrictions will remain in place until 5/11/2021          If you have any questions or concerns, please don't hesitate to call.    Sincerely,        Electronically signed by Savannah Mckeon MD

## 2021-06-21 NOTE — LETTER
Letter by Ivone Rasmussen PA-C at      Author: Ivone Rasmussen PA-C Service: -- Author Type: --    Filed:  Encounter Date: 3/30/2021 Status: (Other)         March 30, 2021     Patient: Nora Jay   YOB: 1975   Date of Visit: 3/30/2021       To Whom It May Concern:    It is my medical opinion that Nora Jay should remain out of work until until her next follow up visit to be re evaluated in 6 weeks, approx. 5/11/21.    If you have any questions or concerns, please don't hesitate to call.    Sincerely,        Electronically signed by Ivone Rasmussen PA-C

## 2021-06-21 NOTE — LETTER
Letter by Savannah Mckeon MD at      Author: Savannah Mckeon MD Service: -- Author Type: --    Filed:  Encounter Date: 2/11/2021 Status: (Other)       Dear Ms. Jay,    This letter will help in preparation for your upcoming surgery. Please contact us with any additional questions you may have regarding your surgery. Contact information for your surgery scheduler:   Blayne Valentine, and Ted: [phone] ~ Francoise Farias and Caryn: [phone] ~ Jacob    You are scheduled for: Cervical 5-6 Anterior Cervical Discectomy and Fusion with Plate  With: Dr. Savannah Mckeon  Date/Time: Monday, February 15, 2021 at 11:50 am  (time subject to change)  Location: [place] [address]    Check in at the Welcome Desk inside the main doors of the \[facility]\"". An escort will take you to the surgery waiting area. A nurse from [facility] (surgery admit unit) at the hospital will call you with your exact arrival time to the hospital - typically one-and-a-half to two-and-a-half hours prior to your scheduled surgery time.     In the event of an emergency surgery case, there may be an adjustment to your start time for surgery.     PREPARING FOR YOUR SURGERY    *Pre-op Physical: Done on 2/11/21 with Dr. Lin at the [facility].      *Please discuss the necessity of receiving a pneumococcal vaccine prior to surgery at your pre-op physical. Recommended for all patients over the age of 65, or based on certain medical conditions.     *After the pre-op physical is complete, please have your clinic fax the visit note to your surgery scheduler at [phone].    *Pre-op Lab Work: Done on 2/10/21 at the [facility] lab.     *COVID-19 Testing: Done on 2/11/21 at the [facility] lab.     *Readiness Visit: Dr. Mckeon's nurse will call you prior to your procedure to go over your Pre-op physical and lab results,  give Pre-surgery instructions and also answer any additional questions you may have about your upcoming surgery.     *Ensure that you have completed your pre-op physical, along with any other necessary tests/appointments (listed above), prior to your Readiness Visit.               ADDITIONAL INFORMATION REGARDING YOUR SURGERY    Medications    Bring a list ALL of your medications, including any over-the-counter vitamins and herbal supplements to your Readiness Visit, and on the day of surgery.    DO NOT bring your medications with you the day of surgery.    Please see attached third sheet for more details on medications/vitamins/herbal supplements that should be discontinued prior to your surgery date.     If you are unsure if you should discontinue a medication/ vitamin/herbal supplement, please call our office and discuss with a nurse.    Continue taking your medications/vitamins/herbal supplements unless they are on the attached list.     Failure to follow the instructions regarding medications/vitamins/herbal supplements will result in cancellation of your surgery.    Day BEFORE Surgery    DO NOT shave near your surgical site. This can cause irritation of the skin    Using a washcloth and provided bottle of Hibiclens, shower the night BEFORE surgery, using a half bottle of Hibiclens to wash your body, avoiding face and genitals. The morning OF surgery, shower and use the second half of the bottle to wash your body, avoiding face and genitals. If you are unable to take a shower the morning of surgery, please discuss your options with the nurse at your readiness visit.     NOTHING  to eat after 11:00 p.m. the night prior to your procedure    CLEAR LIQUIDS: May have the following liquids up to two (2) hours before your arrival time at the hospital: water, plain black coffee (no cream or milk), plain black tea or plain green tea (no cream or milk), Gatorade or Propel Water.    SMOKING: Stop smoking as far before  surgery as possible, or as directed by your surgeon. NO tobacco products of any kind (cigarettes, e-cigarettes, chewing tobacco) beginning at 11:00 p.m. the night prior to your procedure.     ALCOHOL: You should stop drinking alcohol beginning at 11:00 p.m. the night prior to your procedure    Contact our office if you have symptoms of illness such as a fever of 101 or greater, chills, cough, sore throat, or if you develop a rash or any open sore    Day OF Surgery    If youve been instructed to take a medication(s) on the morning of surgery, please take with a very small sip of water.    Wear loose & comfortable clothing and flat shoes, Leave jewelry/valuables at home. If you wear contact lenses, remove them at home and wear glasses. Remove any body piercings. Remove nail polish.     Planning for Discharge    Start planning for your care after discharge as soon as you receive this letter.    If you have not made arrangements to have someone take you home and stay with you for the first 48 hours after discharge, your surgery will be cancelled.        PRE-OPERATIVE MEDICATION INSTRUCTIONS  Review this information with your primary care physician prior to discontinuing any of the medications listed below.  Notify your primary care physician that you have been instructed to discontinue these medications.    TEN (10) Days Prior to Surgery, STOP the Following Medications   Isidra-Hartwick  Anacin  Aspirin  Excedrin  Pepto Bismol    **Before taking ANY over-the-counter medications, check the label for Aspirin   Non-steroidal   Anti-inflammatory Medications (NSAIDS)    Celebrex  Diclofenac (Cataflam)  Etodolac (Iodine)  Fenoprofen (Nalfon)  Ibuprofen (Advil, Motrin, Nuprin)  Indomethacin (Indocin)  Ketoprofen  Ketorolac (Toradol)  Melaxicam (Mobic)  Naproxen (AnaProx, Aleve, Naprosyn)  Relafen (Nabumetone)   Herbal Supplements (this is a partial list of herbals to be discontinued)    Kalyn Stout  Fish  Oil  Flaxseed Oil  Garlic  Noemí  Gingko  Ginseng  Goldenseal  Imitrex (Sumatriptan)  Kava  Krill Oil  Licorice  Multi Vitamins  Corson Wort  Valerian  Vitamin E  Yohimbe   CHECK WITH YOUR PRESCRIBING DOCTOR BEFORE STOPPING ANY BLOOD THINNERS (approximately 7 days prior to surgery)  (Coumadin, Plavix, Platel, Aggrenox, Effient (Prasugrel), Ticlid), Xarelto, and Pradaxa      ALWAYS CHECK WITH YOUR PRESCRIBING DOCTOR REGARDING THE MEDICATIONS LISTED BELOW; RECOMMENDED STOP TIME IS ALSO LISTED      If you are taking Lovenox, discontinue 24 HOURS prior to surgery    If you are taking weight loss medication, discontinue 7 days prior to surgery    If you are taking Metformin or Simvastatin, check with your primary care physician (or whoever has prescribed you this medication) regarding when to discontinue prior to surgery

## 2021-06-21 NOTE — LETTER
Letter by Savannah Mckeon MD at      Author: Savannah Mckeon MD Service: -- Author Type: --    Filed:  Encounter Date: 4/4/2021 Status: (Other)         April 5, 2021     Patient: Nora Jay   YOB: 1975   Date of Visit: 4/4/2021       To Whom It May Concern:    It is my medical opinion that Nora Jay may return to work on 5/1/2021 with the following restrictions: no lifting, bending, twisting, pushing, or pulling greater than 5-10 pounds. No overhead reaching. These restrictions will remain in place until 5/11/2021.     If you have any questions or concerns, please don't hesitate to call.    Sincerely,        Electronically signed by Savannah Mckeon MD

## 2021-06-21 NOTE — LETTER
Letter by Amanda Argueta DO at      Author: Amanda Argueta DO Service: -- Author Type: --    Filed:  Date of Service:  Status: (Other)       February 4, 2021     Patient: Nora Jay   YOB: 1975   Date of Visit: 2/4/2021       To Whom It May Concern:    It is my medical opinion that Nora Jay be off of work today, Thursday February 4th, 2021, due to a procedure that she had done at our facility.    If you have any questions or concerns, please don't hesitate to call.    Sincerely,        Dr. Amanda Henderson D.O.

## 2021-06-21 NOTE — PROGRESS NOTES
Assessment/Plan:     1. Viral pharyngitis  Rapid strep negative.  Symptoms likely viral.  Recommend symptomatic cares including rest and plenty of fluids.  Tylenol and/or ibuprofen as needed for pain.  Throat lozenges and warm salt water gargle.  Will notify patient if culture is positive.    2. Sore throat  - Rapid Strep A Screen-Throat  - Group A Strep, RNA Direct Detection, Throat    3. Herpes Simplex Type I  - valACYclovir (VALTREX) 500 MG tablet; Take 1 tablet (500 mg total) by mouth 2 (two) times a day for 3 days.  Dispense: 12 tablet; Refill: 3        Subjective:     Nora Jay is a 43 y.o. female who presents with complaints of a sore throat times 4 days.  Associated symptoms include chills and headache.  Patient endorses low-grade fevers.  No significant sinus congestion or cough.  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  She is wondering if this is causing her symptoms.  Denies any cough, wheezing, or shortness of breath.  Patient is utilizing Advil and hot tea for her symptoms.  No known strep contacts.  Patient also has some cold sores on her upper lip.  Symptoms started last week.  She does correlate these with increased stress and lack of sleep.  She has previously used valacyclovir for these, but does not have a current prescription.  She really does not get a lot of outbreaks.  Endorses lip swelling and pain.      The following portions of the patient's history were reviewed and updated as appropriate: allergies, current medications.    Review of Systems  A comprehensive review of systems was performed and was otherwise negative    Objective:     /72 (Patient Site: Right Arm, Patient Position: Sitting, Cuff Size: Adult Regular)  Pulse 91  Temp 98.4  F (36.9  C) (Oral)   Wt 165 lb 4.8 oz (75 kg)  LMP 10/23/2015  BMI 29.28 kg/m2    General Appearance: Alert, cooperative, no distress, appears stated age  Ears: Normal  TM's and external ear canals, both ears  Nose: Nares normal, septum midline,mucosa normal, no drainage  Throat: Mucosa, and tongue normal; teeth and gums normal. Posterior pharynx normal without tonsillar hypertrophy or exudate. 3 cold sore to the left upper lip.   Neck: Supple, symmetrical, trachea midline, no adenopathy  Lungs: Clear to auscultation bilaterally, respirations unlabored  Heart: Regular rate and rhythm, S1 and S2 normal, no murmur, rub, or gallop       Abby Denis, NP-C

## 2021-06-21 NOTE — PROGRESS NOTES
Assessment:   1. Pain of right hand  I suspect right hand strain. Applied a splint and encouraged the use of ice and NSAID for pain management.   RADIOLOGY  I have independently review and interpreted the  imaging, pending the final radiology read.     Plan:   Natural history and expected course discussed. Questions answered.  Rest, ice, compression, and elevation (RICE) therapy.  Reduction in offending activity discussed.  Dorsal finger splinting.  Plain film x-rays per orders.  NSAIDs per medication orders.  Follow up in 2 weeks.     Subjective:   Nora Jay is a 43 y.o. female who presents for evaluation of right hand/finger pain. Onset was sudden, not related to any specific activity. The pain is moderate, worsens with movement, and is relieved by movement. There is associated swelling in 2nd and 3rd metatarsal. Evaluation to date: none. Treatment to date: nothing specific.    The following portions of the patient's history were reviewed and updated as appropriate: allergies, current medications, past family history, past medical history, past social history, past surgical history and problem list.    Review of Systems  Pertinent items are noted in HPI.      Objective:      /78 (Patient Site: Right Arm, Patient Position: Sitting, Cuff Size: Adult Regular)   Pulse 90   Wt 162 lb (73.5 kg)   LMP 10/23/2015   SpO2 100%   BMI 28.70 kg/m    Right hand:  soft tissue tenderness and swelling at the Second and third metatarsal   Left hand:  normal exam, no swelling, tenderness, instability; ligaments intact, full ROM both hands, wrists, and finger joints     Imaging:  X-ray right hand: no fracture, dislocation, swelling or degenerative changes noted

## 2021-06-21 NOTE — LETTER
Letter by Amanda Lin MD at      Author: Amanda Lin MD Service: -- Author Type: --    Filed:  Encounter Date: 3/29/2021 Status: (Other)         Nora Jay  [address] [address]             March 29, 2021         Dear Ms. Jay,    Below are the results from your recent visit:     Your colonoscopy can be repeated in 10 years. See your report from [facility] for details of  the findings.     Please call with questions or contact us using [employer].    Sincerely,        Electronically signed by Amanda Lin MD

## 2021-06-24 NOTE — TELEPHONE ENCOUNTER
Patient notified she will need an appointment. Scheduled ofv with  for this afternoon, states she may call to cancel.  Jennifer Gonsalez [facility] CMT

## 2021-06-24 NOTE — TELEPHONE ENCOUNTER
Who is calling:  Patient   Reason for Call:  Wondering if she can get orders put in to be tested for mono. She stated that her son was diagnosed last week with mono and she has not been feeling good. She would like to come in for just lab work and not an OV.   Date of last appointment with primary care: 11.27.18  Has the patient been recently seen:  No  Okay to leave a detailed message: Yes

## 2021-06-27 NOTE — PROGRESS NOTES
Progress Notes by Amanda Lin MD at 8/23/2019  4:20 PM     Author: Amanda Lin MD Service: -- Author Type: Physician    Filed: 8/25/2019  9:10 PM Encounter Date: 8/23/2019 Status: Signed    : Amanda Lin MD (Physician)       FEMALE PREVENTATIVE EXAM    Assessment and Plan:     1. Routine general medical examination at a health care facility  Not fasting today- but she did have a glucose of 90 on 7/15/19 around noon, not fasting.   - Lipid Cascade.   - Pap not indicated w/ h/o hysterectomy  - reviewed mammogram every 1-2 years ages 40-50 to start screening  - declines HIV screening    2. Neck pain  3. Insomnia  Chronic neck pain. Thinks this may be contributing to poor sleep. No red flag sx/signs on exam to suggest meningitis or any acute pathology. Reviewed ergonomics, use of NSAIDs, and eventual PT if needed. Ok for trial of flexeril which has been helpful for her in the past. Reviewed sleep hygiene. Will return with sleep diary if desires further evaluation.  - cyclobenzaprine (FLEXERIL) 5 MG tablet; 1 tablet at bedtime as needed for neck pain  Dispense: 30 tablet; Refill: 0    4. Anaphylactic reaction to bee sting, accidental or unintentional, subsequent encounter  Patient describes symptoms consistent with anaphylaxis and would benefit from having an EpiPen available.  Reviewed indications for use of this life saving medication and reviewed technique using demonstrator pen today.  Patient was able to appropriately administer  pen under my supervision and understands risks and benefits of use should sx develop.   - EPINEPHrine (EPIPEN/ADRENACLICK/AUVI-Q) 0.3 mg/0.3 mL injection; Inject 0.3 mL (0.3 mg total) into the shoulder, thigh, or buttocks once for 1 dose.  Dispense: 2 Pre-filled Pen Syringe; Refill: 0    5. Herpes Simplex Type I  Cold sores intermittently, mary when sleep is poor. Rx sent should she need this.   - valACYclovir (VALTREX) 500 MG tablet; Take 1 tablet  "(500 mg total) by mouth 2 (two) times a day for 3 days.  Dispense: 12 tablet; Refill: 0    6. Dyshidrotic eczema  Hands with rough patches and papules likely c/w dyshidrotic eczema. Reviewed emollient use as well as steroid cream x 2 weeks. Wear gloves to prevent scratching.   - triamcinolone (KENALOG) 0.5 % ointment; Apply twice daily to the fingers for 2 weeks.  Dispense: 30 g; Refill: 0      I spent 25 minutes with the patient, >50% of which was in counseling regarding patient's medical issues as noted above, excluding the time spent on routine preventative health.      Next follow up:  Return in about 4 weeks (around 9/20/2019) for Recheck.    Immunization Review  Adult Imm Review: No immunizations due today    I discussed the following with the patient:   Adult Healthy Living: Importance of regular exercise  Healthy nutrition  Getting adequate sleep  Stress management  Use of seat belts  STI prevention  Supplement use      Subjective:   Chief Complaint: Nora Jay is an 44 y.o. female here for a preventative health visit.     HPI:      Daughter Khadra 20yo and her 19yo son just is off to college.  Difficult transition. Lives with her dog.     Having issues with insomnia. Hard to fall asleep. Some neck pains, had been helped by flexeril long ago and wondering if she can get a refill. Maybe 3-4 hours of sleep this past week. Ruminates a lot. Has 15hr work days so feels her brain is \"hard to shut off\". Keeps a night stand diary. Doesn't feel worried or anxious necessarily.   Walks 5lb [employer] dog for exercise. Otherwise walks or runs 3-5 miles a couple times per week.     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   Also coaches gymnastics in the evenings.     She takes phentermine for weight loss management. [facility].     Had a recent PNA (aspirated during recent tooth extraction). Feels completely better- no cough/SOB/fever. Finished abx as prescribed. " "      Healthy Habits  Are you taking a daily aspirin? No  Do you typically exercising at least 40 min, 3-4 times per week?  Yes  Do you usually eat at least 4 servings of fruit and vegetables a day, include whole grains and fiber and avoid regularly eating high fat foods? NO  Have you had an eye exam in the past two years? NO  Do you see a dentist twice per year? Yes  Do you have any concerns regarding sleep? YES    Safety Screen  If you own firearms, are they secured in a locked gun cabinet or with trigger locks? The patient does not own any firearms  Do you feel you are safe where you are living?: Yes (8/23/2019  4:13 PM)  Do you feel you are safe in your relationship(s)?: Yes (8/23/2019  4:13 PM)      Review of Systems:  Please see above.  The rest of the review of systems are negative for all systems.     Hysterectomy. No paps indicated.     Cancer Screening     Patient has no health maintenance due at this time          Patient Care Team:  Amanda Lin MD as PCP - General (Family Medicine)        History     Reviewed By Date/Time Sections Reviewed    Amanda Lin MD 8/25/2019  9:08 PM Social Documentation    Amanda Lin MD 8/25/2019  9:07 PM Social Documentation    Amanda Lin MD 8/25/2019  8:59 PM Medical, Surgical, Tobacco, Family    NicholasMichelle LPN 8/23/2019  4:13 PM Tobacco            Objective:   Vital Signs:   Visit Vitals  /70 (Patient Site: Left Arm, Patient Position: Sitting, Cuff Size: Adult Regular)   Pulse 88   Ht 5' 3\" (1.6 m)   Wt 154 lb 1.6 oz (69.9 kg)   LMP 10/23/2015   BMI 27.30 kg/m           PHYSICAL EXAM  Constitutional: Patient is oriented to person, place, and time. Patient appears well-developed and well-nourished. No distress.   Head: Normocephalic and atraumatic.   Ears: External ear and TMs normal bilaterally.  Nose: Nose normal.   Mouth/Throat: Oropharynx is clear and moist. No oropharyngeal exudate.   Eyes: Conjunctivae and EOM are " normal. Pupils are equal, round, and reactive to light. No discharge. No scleral icterus.   Neck: Neck supple. No midline tenderness. No paracervical spinal pain to palpation. Normal lateral flexion and rotation of the neck. No JVD present. No tracheal deviation present. No thyromegaly present.  Breasts: not indicated based on USPSTF recommendations for asymptomatic women  Cardiovascular: Normal rate, regular rhythm, normal heart sounds and intact distal pulses. No murmur heard.   Pulmonary/Chest: Effort normal and breath sounds normal. Patient has no wheezes, no rales, exhibits no tenderness.   Abdominal: Soft. Bowel sounds are normal. No masses. There is no tenderness.   Lymphadenopathy:  Patient has no cervical adenopathy.   Neurological: Patient is alert and oriented to person, place, and time. Patient has normal reflexes. No cranial nerve deficit. Coordination normal.   Skin: deep seated vesicles affecting lateral and dorsal aspects of the fingers of the right hand with some patches of thickened lichenified skin over her knuckles, c/w dyshidrotic eczema  Pelvic: not indicated based on USPSTF recommendations for asymptomatic women  Psychiatric: Patient has good eye contact without any psychomotor retardation or stereotypic behaviors.  normal mood and affect. Judgment and thought content normal.   Speech is regular rate and rhythm.          Medication List           Accurate as of 8/23/19 11:59 PM. If you have any questions, ask your nurse or doctor.               START taking these medications    triamcinolone 0.5 % ointment  Also known as:  KENALOG  INSTRUCTIONS:  Apply twice daily to the fingers for 2 weeks.  Started by:  Amanda Lin MD           CHANGE how you take these medications    EPINEPHrine 0.3 mg/0.3 mL injection  Also known as:  EPIPEN/ADRENACLICK/AUVI-Q  INSTRUCTIONS:  Inject 0.3 mL (0.3 mg total) into the shoulder, thigh, or buttocks once for 1 dose.  Doctor's comments:  Please dispense  product with lowest cost to patient  What changed:  how much to take  Changed by:  Amanda Lin MD        valACYclovir 500 MG tablet  Also known as:  VALTREX  INSTRUCTIONS:  Take 1 tablet (500 mg total) by mouth 2 (two) times a day for 3 days.  Stop taking on:  8/26/2019  What changed:  See the new instructions.  Changed by:  Amanda Lin MD           CONTINUE taking these medications    aspirin-acetaminophen-caffeine 250-250-65 mg per tablet  Also known as:  EXCEDRIN MIGRAINE  INSTRUCTIONS:  Take 1 tablet by mouth every 6 (six) hours as needed for pain.        cyclobenzaprine 5 MG tablet  Also known as:  FLEXERIL  INSTRUCTIONS:  1 tablet at bedtime as needed for neck pain        phentermine 37.5 mg tablet  Also known as:  ADIPEX-P  INSTRUCTIONS:  Take 1/2 to 1 tablet in the morning.  Doctor's comments:  Please disregard previous RX for capsule unless patient specifically prefers capsules           STOP taking these medications    hydrocortisone 2.5 % cream  Stopped by:  Amanda Lin MD           Where to Get Your Medications      These medications were sent to [facility] [address] [place] [address] [address] [address]    Phone:  [phone]     cyclobenzaprine 5 MG tablet    EPINEPHrine 0.3 mg/0.3 mL injection    triamcinolone 0.5 % ointment    valACYclovir 500 MG tablet         Additional Screenings Completed Today:

## 2021-06-30 NOTE — PROGRESS NOTES
Progress Notes by Vinh Clemons PA-C at 2/2/2021  4:30 PM     Author: Vinh Clemons PA-C Service: -- Author Type: Physician Assistant    Filed: 2/3/2021  5:44 PM Encounter Date: 2/2/2021 Status: Signed    : Vinh Clemons PA-C (Physician Assistant)       Chief Complaint   Patient presents with   ? Neck Pain     neck pain radiates to right arm sharp pain         Clinical Decision Making:  Multiple etiologies and diagnoses were considered to include, but not limited to, cervical radiculopathy, muscle entrapment with the rhomboids and the trapezius, rotator cuff tear or strain, cervical strain.  I do favor cervical radiculopathy based on the patient's history and physical findings.    Patient was given Toradol 60 mg IM and allowed to wait for 20 minutes. NO improvement of pain and identified pain 8/10 continuing with exacerbation to 10/10 with movement. Patient had used Ibuprofen 600 mg at home at 3 PM without relief. Patient instructed to not to use NSAID for next 12 hours. Patient is very uncomfortable with pain increasing and unable to rest. Is sent to next higher level of care for evaluation and treatment as well as treatment of her pain.  Patient is stable enough to go by personal vehicle the short distance to the emergency room.  I called and gave report to the emergency room physician and staff.  Questions were answered to patient's satisfaction before discharge.    A total of 60 minutes was spent reviewing the patient's chart, previous histories, history and physical findings, treating with Toradol for pain control in the office, allowing her to wait reassessing her pain control and level.  Review of pathophysiology and review of the treatment plan and her symptoms in the office with coordination of care and transfer to the emergency room speaking with the emergency room physician and answering patient questions.    1. Right cervical radiculopathy  XR Cervical Spine 4 - 5 VWS    ketorolac injection 60 mg  (TORADOL)    Ambulatory referral to Neurosurgery [facility]    XR Cervical Spine 2 - 3 VWS    traMADoL (ULTRAM) 50 mg tablet    cyclobenzaprine (FLEXERIL) 10 MG tablet         Patient Instructions     For the first 2 days ice the area 20 minutes on each hour while awake avoiding and taking precautions to damage the skin  Do not fall asleep with ice on the skin.  Over-the-counter ibuprofen 600 mg 3 times a day with food for analgesia and anti-inflammatory effect as long as there is no contraindication to taking the medications.  General risks and benefits of the medication were gone over.  Do not start the ibuprofen until tomorrow.   Take muscle relaxer and pain medication at nighttime.  Do not take during the daytime as he may become impaired and sleepy.  Do not take with alcohol.  Do not operate machinery, watch small children or do other attentional activities where you may hurt yourself or somebody else.  Follow-up with neurosurgery for evaluation and treatment, returning to the emergency room in the interim if pain or other complication or new symptoms arise in the interim.       Patient Education     Understanding Cervical Radiculopathy    Cervical radiculopathy is irritation or inflammation of a nerve root in the neck. It causes neck pain and other symptoms that may spread into the chest or down the arm. To understand this condition, it helps to understand the parts of the spine:    Vertebrae. These are bones that stack to form the spine. The cervical spine contains the 7 vertebrae in the neck.    Disks. These are soft pads of tissue between the vertebrae. They act as shock absorbers for the spine.    The spinal canal. This is a tunnel formed within the stacked vertebrae. The spinal cord runs through this canal.    Nerves. These branch off the spinal cord. As they leave the spinal canal, nerves pass through openings between the vertebrae. The nerve root is the part of the nerve that is closest to the spinal  cord.   With cervical radiculopathy, nerve roots in the neck become irritated. This leads to pain and symptoms that can travel to the nerves that go from the spinal cord down the arms and into the torso.  What causes cervical radiculopathy?  Aging, injury, poor posture, and other issues can lead to problems in the neck. These problems may then irritate nerve roots. These include:    Damage to a disk in the cervical spine. The damaged disk may then press on nearby nerve roots.    Degeneration from wear and tear, and aging. This can lead to narrowing (stenosis) of the openings between the vertebrae. The narrowed openings press on nerve roots as they leave the spinal canal.    An unstable spine. This is when a vertebra slips forward. It can then press on a nerve root.  There are other, less common causes of pressure on nerves in the neck. These include infection, cysts, and tumors.  Symptoms of cervical radiculopathy  These include:    Neck pain    Pain, numbness, tingling, or weakness that travels down the arm    Loss of neck movement    Muscle spasms  Treatment for cervical radiculopathy  In most cases, your healthcare provider will first try treatments that help relieve symptoms. These may include:    Prescription or over-the-counter pain medicines. These help relieve pain and swelling.    Cold packs. These help reduce pain.    Resting. This involves avoiding positions and activities that increase pain.    Neck brace (cervical collar). This can help relieve inflammation and pain.    Physical therapy, including exercises and stretches. This can help decrease pain and increase movement and function.    Shots of medicinesaround the nerve roots. This is done to help relieve symptoms for a time.  In some cases, your healthcare provider may advise surgery to fix the underlying problem. This depends on the cause, the symptoms, and how long the pain has lasted.  Possible complications  Over time, an irritated and inflamed  nerve may become damaged. This may lead to long-lasting (permanent) numbness or weakness. If symptoms change suddenly or get worse, be sure to let your healthcare provider know.     When to call your healthcare provider  Call your healthcare provider right away if you have any of these:    New pain or pain that gets worse    New or increasing weakness, numbness, or tingling in your arm or hand    Bowel or bladder changes   Date Last Reviewed: 3/10/2016    4801-4086 [employer]. [address] [place] [address]. All rights reserved. This information is not intended as a substitute for professional medical care. Always follow your healthcare professional's instructions.           Patient Education     Pinched Nerve in the Neck  A pinched nerve in the neck (cervical radiculopathy) is caused when the nerve that goes from the spinal cord to the neck or arm is irritated or has pressure on it. This may be caused by a bulging spinal disk. A spinal disk is the cushion between each spinal bone. Or it may be caused by a narrowing of the spinal joint because of osteoarthritis and wear and tear from repeated injuries.  A pinched nerve can cause numbness, tingling, deep aching, or electrical shooting pain from the side of the neck all the way down to the fingers on one side.  A pinched nerve may start after a sudden turning or bending force (such as in a car accident) or after a simple awkward movement. In either case, muscle spasm is commonly present and adds to the pain.  Home care  Follow these guidelines when caring for yourself at home:    Rest and relax the muscles. Use a comfortable pillow that supports your head and keeps your spine in a natural (neutral) position. Your head shouldnt be tilted forward or backward. A rolled-up towel may help for a custom fit. When standing or sitting, keep your neck in line with your body. Keep your head up and shoulders down. Stay away from activities that require  you to move your neck a lot.    You can use heat and massage to help ease the pain. Take a hot shower or bath, or use a heating pad. You can also use a cold pack for relief. You can make a cold pack by wrapping a plastic bag of crushed or cubed ice in a thin towel. Try both heat and cold, and use the method that feels best. Do this for 20 minutes several times a day.    You may use acetaminophen or ibuprofen to control pain, unless another pain medicine was prescribed. If you have chronic liver or kidney disease, talk with your healthcare provider before using these medicines. Also talk with your provider if youve had a stomach ulcer or gastrointestinal bleeding.    Reduce stress. Stress can make it longer for your pain to go away.    Do any exercises or stretches that were given to you as part of your discharge plan.    Wear a soft collar, if prescribed.    Physical therapy and massages are known to help.    You may need surgery for a more serious injury.  Follow-up care  Follow up with your healthcare provider, or as advised, if you dont start to get better after 1 week. You may need more tests. Tell your provider about any fever, chills, or weight loss.  If X-rays were taken, a radiologist may look at them. You will be told of any new findings that may affect your care.  When to seek medical advice  Call your healthcare provider right away if any of these occur:    Pain becomes worse even after taking prescribed pain medicine    Weakness in the arm or legs    Numbness in the arm gets worse    Trouble breathing or swallowing  Date Last Reviewed: 5/1/2017 2000-2017 [employer]. [address] [place] [address], [place] [address]. All rights reserved. This information is not intended as a substitute for professional medical care. Always follow your healthcare professional's instructions.                HPI:  Nora Jay is a 45 y.o. female who presents today complaining of right-sided cervical pain and  shoulder pain with radiation into the right upper extremity.  Patient is complaining of proximal-isms of sharp shooting pain that is up to a 10 out of 10 at times.  Without movement and sitting still she has pain that is constant and is 8 out of 10.  Patient was uncomfortable throughout the day and is now presenting to clinic for evaluation and treatment since she has not been able to find relief at home.  She states that the pain will radiate from the lateral neck to the shoulder and down into the right upper extremity and down into the fingertips.  Patient is describing pain into the C6-C7 dermatome on the right upper extremity and into the hand.  She is also describing a feels that she has subjective weakness secondary to pain.  She also is describing some pressure into the right anterior upper chest wall in the C5 T1 dermatome and it is reproducible with physical examination.  Does not have shortness of breath or left chest arm or jaw pain or other cardiac symptoms such as vomiting or risk factors for vascular disease. Intermittently she has some symptoms that are shooting into the left upper extremity but are not as consistent or as painful as the right.  She denies history of trauma, prior surgery, any precipitating event or injury.  She is a right-hand-dominant female.  Patient tried an NSAID, ibuprofen 600 mg, earlier in the day without relief.  Her symptoms are exacerbated by movement of her neck shoulder and right upper extremity.  Patient has had difficulty with doing any activities of daily living today.    History obtained from the patient.    Problem List:  2015-10: Fibroids, subserous  Herpes Simplex Type I  Migraine Headache  Wrist Sprain  Foot Pain (Soft Tissue)  Ankle Sprain  Upper Back Pain (Between Shoulder Blades)      Past Medical History:   Diagnosis Date   ? Fibroids        Social History     Tobacco Use   ? Smoking status: Former Smoker   ? Smokeless tobacco: Never Used   Substance Use  "Topics   ? Alcohol use: No       Review of Systems  As above in HPI otherwise negative.    Vitals:    02/02/21 1625   BP: (!) 151/96   Patient Site: Right Arm   Patient Position: Sitting   Cuff Size: Adult Regular   Pulse: 90   SpO2: 100%   Weight: 155 lb (70.3 kg)   Height: 5' 3\" (1.6 m)       Physical Exam    General: Patient is uncomfortable in the office and is crying.  She is also changing and switching positions and moving the right upper extremity to try to become comfortable.  Patient is in pain and is crying in the office.  HEENT: Head is normocephalic atraumatic   Rotation of the neck left and right is problematic for the patient does reproduce her symptoms onto the right side.  Does radiate into the shoulder of the right upper extremity.  Spurling maneuvers on the right is positive for pain into the right upper extremity.   Musculoskeletal: Spurling's is positive on right side.  Decreased ROM on the right side with range of motion testing on the neck.  Abduction and adduction of the right side of the neck is very problematic painful for the patient and is no longer attempted to be examined in the office.  DTRs are 3 out of 5 at the biceps on both left and right arms.  Strength is 3 out of 5 and is painful to the patient because of the right-sided arm pain.  Range of motion at the shoulder is severely decreased secondary to pain.  Ental internal and external rotation of the head of humerus is well-tolerated but abduction and flexion of the shoulder is poorly tolerated secondary to pain in the shoulder and neck.  She appears to be sensory intact to light touch in all dermatomes the right upper extremity.    Labs:  Recent Results (from the past 72 hour(s))   HM2 (CBC W/O DIFF)   Result Value Ref Range    WBC 8.8 4.0 - 11.0 thou/uL    RBC 4.31 3.80 - 5.40 mill/uL    Hemoglobin 12.8 12.0 - 16.0 g/dL    Hematocrit 38.8 35.0 - 47.0 %    MCV 90 80 - 100 fL    MCH 29.7 27.0 - 34.0 pg    MCHC 33.0 32.0 - 36.0 g/dL "    RDW 12.3 11.0 - 14.5 %    Platelets 350 140 - 440 thou/uL    MPV 9.1 8.5 - 12.5 fL   C-Reactive Protein   Result Value Ref Range    CRP 0.3 0.0 - 0.8 mg/dL   Basic Metabolic Panel   Result Value Ref Range    Sodium 139 136 - 145 mmol/L    Potassium 4.1 3.5 - 5.0 mmol/L    Chloride 106 98 - 107 mmol/L    CO2 27 22 - 31 mmol/L    Anion Gap, Calculation 6 5 - 18 mmol/L    Glucose 103 70 - 125 mg/dL    Calcium 8.6 8.5 - 10.5 mg/dL    BUN 7 (L) 8 - 22 mg/dL    Creatinine 0.76 0.60 - 1.10 mg/dL    GFR MDRD Af Amer >60 >60 mL/min/1.73m2    GFR MDRD Non Af Amer >60 >60 mL/min/1.73m2       Radiology:  I have personally ordered and preliminarily reviewed the following xray, I have noted no fractures or spondylolithesis.    Xr Cervical Spine 2 - 3 Vws    Result Date: 2/2/2021  EXAM: XR CERVICAL SPINE 2 - 3 VWS LOCATION: [facility] DATE/TIME: 2/2/2021 5:05 PM INDICATION: cervical radiculopathy right sided neck pain COMPARISON: None. TECHNIQUE: CR Cervical Spine.     Mild reversal normal lordotic curvature. Mild degenerative changes worse at C5-C6. No radiographic acute fractures. Vertebral heights maintained. No significant subluxations. No prevertebral soft tissue swelling. Predental space within normal  limits. Limited evaluation dens appears intact.

## 2021-06-30 NOTE — PROGRESS NOTES
Progress Notes by Amanda Lin MD at 1/29/2021  3:20 PM     Author: Amanda Lin MD Service: -- Author Type: Physician    Filed: 2/1/2021 10:03 PM Encounter Date: 1/29/2021 Status: Signed    : Amanda Lin MD (Physician)       FEMALE PREVENTATIVE EXAM    Assessment and Plan:      Patient has been advised of split billing requirements and indicates understanding: Yes    Nora was seen today for annual exam, neck pain and ankle injury.    Routine general medical examination at a health care facility  Recent several mental health stressors/grief reviewed today; denies need for additional services for coping.   -     Mammo Screening Bilateral; Future  -     Ambulatory referral for Colonoscopy - [facility]  -     Hemoglobin  -     Glucose  -     Lipid [place]    Visit for screening mammogram  -     Mammo Screening Bilateral; Future    Neck pain  Trial option for alternative muscle relaxant but advised further evaluation/follow up if sx continue  -     TiZANidine (ZANAFLEX) 2 MG capsule; Take 1-2 capsules (2-4 mg total) by mouth at bedtime as needed for muscle spasms.    Screening for colon cancer  Family history of colon cancer  -     Ambulatory referral for Colonoscopy - [facility]    History of cold sores  Needs refill, option for preventative tx if frequent flares   -     valACYclovir (VALTREX) 500 MG tablet; Take 2 tablets (1,000 mg total) by mouth 2 (two) times a day. For 1 day. Save remaining tabs for subsequent flares    Pain in joint, ankle and foot, left  Ongoing issues with left ankle/foot with possible tarsal tunnel syndrome. Has bene seen by orthopedics for months without significant improvement despite PT, injections, etc. Will consider referral to podiatry  -     Ambulatory referral to Podiatry - [facility] (includes [facility] groups)        Next follow up:  Return in about 1 year (around 1/29/2022) for Annual physical, or sooner if symptoms not  improving.    Immunization Review  Adult Imm Review: No immunizations due today    I discussed the following with the patient:   Adult Healthy Living: Importance of regular exercise  Healthy nutrition  Getting adequate sleep  Stress management  STI prevention  Contraception options  Supplement use  Herbal medications/alternative medical therapies      Subjective:   Chief Complaint: Nora Jay is an 45 y.o. female here for a preventative health visit.    Patient has been advised of split billing requirements and indicates understanding: Yes  HPI:    Chief Complaint   Patient presents with   ? Annual Exam     fasting, Fhx colon cancer in [place], hx of constipation   ? Neck Pain     would like to get a muscle relaxer to help her sleep   ? Ankle Injury     injured it 2 years ago in May, rolled ankle, Nothing broken and a normal MRI, hx of surgery, still swollen and aches       [place] [facility] [place] also has a hx of constipation and would like screening scope  She is also concerned as her  59yo Friend  of colon cancer (6 weeks after dx) as did another friend recently dx age 30s.     Her son's best friend committed suicide- he is going through therapy. Leila denies mood concerns for herself but obviously devastated at the loss of a family friend.    Would like a refill of the muscle relaxor- wants to trial different one from flexeril. For neck tension which she uses for neck pain to sleep at night.     Ankle injury. Has followed with [facility] for this issue.    4th and 5th toe numbness and radiates up the the leg  Had an MRI in the past    Hysterectomy for fibroids and endometriosis.     Taking phentermine for weight loss still; rx through Bariatric clinic.    Healthy Habits  Are you taking a daily aspirin? No  Do you typically exercising at least 40 min, 3-4 times per week?  Yes  Do you usually eat at least 4 servings of fruit and vegetables a day, include whole grains and fiber and avoid regularly  "eating high fat foods? Yes  Have you had an eye exam in the past two years? Yes  Do you see a dentist twice per year? Yes  Do you have any concerns regarding sleep? YES    Safety Screen  If you own firearms, are they secured in a locked gun cabinet or with trigger locks? The patient does not own any firearms  Do you feel you are safe where you are living?: Yes (1/29/2021  3:28 PM)  Do you feel you are safe in your relationship(s)?: Yes (1/29/2021  3:28 PM)      Review of Systems:  Please see above.  The rest of the review of systems are negative for all systems.       Cancer Screening       Status Date      MAMMOGRAM Overdue 12/15/2017      Done 12/15/2016 MAMMO SCREENING BILATERAL     Patient has more history with this topic...    PAP SMEAR This plan is no longer active.           Patient Care Team:  Amanda Lin MD as PCP - General (Family Medicine)  Yolande Samuel FNP as Assigned PCP        History     Reviewed By Date/Time Sections Reviewed    Amanda Lin MD 1/29/2021  3:48 PM Medical, Surgical, Tobacco, Family    Michelle Peterson LPN 1/29/2021  3:27 PM Tobacco            Objective:   Vital Signs:   Visit Vitals  /68 (Patient Site: Left Arm, Patient Position: Sitting, Cuff Size: Adult Regular)   Pulse 76   Ht 5' 2.5\" (1.588 m)   Wt 155 lb 11.2 oz (70.6 kg)   LMP 10/23/2015   BMI 28.02 kg/m           PHYSICAL EXAM  Constitutional: Patient is oriented to person, place, and time. Patient appears well-developed and well-nourished. No distress.   Head: Normocephalic and atraumatic.   Ears: External ear and TMs normal bilaterally.  Nose: Nose normal.   Mouth/Throat: Oropharynx is clear and moist. No oropharyngeal exudate.   Eyes: Conjunctivae and EOM are normal. Pupils are equal, round, and reactive to light. No discharge. No scleral icterus.   Neck: Neck supple. No JVD present. No tracheal deviation present. No thyromegaly present.  Breasts: not indicated based on USPSTF " recommendations for asymptomatic women  Cardiovascular: Normal rate, regular rhythm, normal heart sounds and intact distal pulses. No murmur heard.   Pulmonary/Chest: Effort normal and breath sounds normal. Patient has no wheezes, no rales, exhibits no tenderness.   Abdominal: Soft. Bowel sounds are normal. No masses. There is no tenderness.   Lymphadenopathy:  Patient has no cervical adenopathy.   Neurological: Patient is alert and oriented to person, place, and time. Patient has normal reflexes. No cranial nerve deficit. Coordination normal.   Skin: Skin is warm and dry. No rash noted. No pallor.   Pelvic: not indicated based on USPSTF recommendations for asymptomatic women  Psychiatric: Patient has good eye contact without any psychomotor retardation or stereotypic behaviors.  normal mood and affect. Judgment and thought content normal.   Speech is regular rate and rhythm.       The ASCVD Risk score (Prewitt DC Jr., et al., 2013) failed to calculate for the following reasons:    Unable to determine if patient is Non- African American         Medication List          Accurate as of January 29, 2021 11:59 PM. If you have any questions, ask your nurse or doctor.            START taking these medications    TiZANidine 2 MG capsule  Also known as: ZANAFLEX  INSTRUCTIONS: Take 1-2 capsules (2-4 mg total) by mouth at bedtime as needed for muscle spasms.  Started by: Amanda Lin MD           CHANGE how you take these medications    valACYclovir 500 MG tablet  Also known as: VALTREX  INSTRUCTIONS: Take 2 tablets (1,000 mg total) by mouth 2 (two) times a day. For 1 day. Save remaining tabs for subsequent flares  What changed:     how much to take    how to take this    when to take this    additional instructions  Changed by: Amanda Lin MD           CONTINUE taking these medications    aspirin-acetaminophen-caffeine 250-250-65 mg per tablet  Also known as: EXCEDRIN MIGRAINE  INSTRUCTIONS: Take 1  tablet by mouth every 6 (six) hours as needed for pain.        cyclobenzaprine 5 MG tablet  Also known as: FLEXERIL  INSTRUCTIONS: 1 tablet at bedtime as needed for neck pain        phentermine 37.5 mg tablet  Also known as: ADIPEX-P  INSTRUCTIONS: Take 1/2 to 1 tablet in the morning.           STOP taking these medications    triamcinolone 0.5 % ointment  Also known as: KENALOG  Stopped by: Amanda Lin MD           Where to Get Your Medications      These medications were sent to [facility] [address] [place] [id number]    Phone: [phone]     TiZANidine 2 MG capsule    valACYclovir 500 MG tablet         Additional Screenings Completed Today:   PHQ9 score No data recorded  Little interest or pleasure in doing things: Not at all  Feeling down, depressed, or hopeless: Not at all    GAD7 score No data recorded  No data recorded

## 2021-07-04 ENCOUNTER — HEALTH MAINTENANCE LETTER (OUTPATIENT)
Age: 46
End: 2021-07-04

## 2021-07-04 NOTE — ADDENDUM NOTE
Addendum Note by Ivone Rasmussen PA-C at 2/22/2021 12:07 PM     Author: Ivone Rasmussen PA-C Service: -- Author Type: Physician Assistant    Filed: 2/22/2021 12:07 PM Encounter Date: 2/22/2021 Status: Signed    : Ivone Rasmussen PA-C (Physician Assistant)    Addended by: IVONE RASMUSSEN on: 2/22/2021 12:07 PM        Modules accepted: Orders

## 2021-07-04 NOTE — ADDENDUM NOTE
Addendum Note by Ivone Rasmussen PA-C at 3/1/2021  2:06 PM     Author: Ivone Rasmussen PA-C Service: -- Author Type: Physician Assistant    Filed: 3/1/2021  2:06 PM Encounter Date: 3/1/2021 Status: Signed    : Ivone Rasmussen PA-C (Physician Assistant)    Addended by: IVONE RASMUSSEN on: 3/1/2021 02:06 PM        Modules accepted: Orders

## 2021-07-15 ENCOUNTER — TRANSFERRED RECORDS (OUTPATIENT)
Dept: NEUROSURGERY | Facility: CLINIC | Age: 46
End: 2021-07-15

## 2021-08-12 ENCOUNTER — OFFICE VISIT (OUTPATIENT)
Dept: NEUROSURGERY | Facility: CLINIC | Age: 46
End: 2021-08-12
Payer: COMMERCIAL

## 2021-08-12 ENCOUNTER — HOSPITAL ENCOUNTER (OUTPATIENT)
Dept: RADIOLOGY | Facility: HOSPITAL | Age: 46
Discharge: HOME | End: 2021-08-12
Attending: NURSE PRACTITIONER | Admitting: NURSE PRACTITIONER
Payer: COMMERCIAL

## 2021-08-12 VITALS
OXYGEN SATURATION: 98 % | SYSTOLIC BLOOD PRESSURE: 119 MMHG | BODY MASS INDEX: 29.41 KG/M2 | HEART RATE: 84 BPM | RESPIRATION RATE: 16 BRPM | HEIGHT: 63 IN | DIASTOLIC BLOOD PRESSURE: 84 MMHG | WEIGHT: 166 LBS

## 2021-08-12 DIAGNOSIS — Z98.1 S/P CERVICAL SPINAL FUSION: Primary | ICD-10-CM

## 2021-08-12 DIAGNOSIS — Z98.1 S/P CERVICAL SPINAL FUSION: ICD-10-CM

## 2021-08-12 DIAGNOSIS — M54.12 CERVICAL RADICULOPATHY: ICD-10-CM

## 2021-08-12 PROCEDURE — 20552 NJX 1/MLT TRIGGER POINT 1/2: CPT | Mod: 58 | Performed by: NURSE PRACTITIONER

## 2021-08-12 PROCEDURE — 72040 X-RAY EXAM NECK SPINE 2-3 VW: CPT

## 2021-08-12 PROCEDURE — 99024 POSTOP FOLLOW-UP VISIT: CPT | Performed by: NURSE PRACTITIONER

## 2021-08-12 ASSESSMENT — MIFFLIN-ST. JEOR: SCORE: 1362.1

## 2021-08-12 NOTE — PROGRESS NOTES
Neurosurgery progress note:    A/P:  Leila is a 45yo F who is approx 6 mos postop s/p Anterior cervical discectomy and arthrodesis cervical 5-cervical 6 by Dr Mckeon on 2/16/21. Preoperatively complained of right arm pain, numbness and tingling into the thumb.    Leila is overall improving. Her right arm pain has not returned. R thumb/hand weakness, numbness and tingling is a little better. She reports posterior neck soreness, worse with activity and over the course of the day. She has consistent difficulty getting to sleep at night. Taking tylenol prn, using ice. Found that posture tape helps. She is still doing PT once a week. Taking flexeril very sparingly because side effects do not agree with her. Cervical XRays today show stable hardware without complication. Does have some straightening of normal cervical lordosis suggestive of ongoing muscle spasms, but overall alignment is much improved from preop. Discussed with Dr Mckeon who recommends referral for Trigger Point Injections and ongoing PT to continue to build strength of her posterior neck muscles. Continue to hold off on NSAIDs at this point bc not fused yet.     Plan:  1. Spine center referral for trigger point injections  2. Continue PT exercises  3. Return in 3 mos with new cervical XR to see Dr Mckeon (Per Dr Mckeon)    Left voicemail on pt's machine to call us back to discuss final plan (above) or     Exam:  General: seated in NAD, appears comfortable    Strength:  Deltoids: 5/5 right, 5/5 left  Triceps: 5/5 right, 5/5 left  Biceps: 5/5 right, 5/5 left  Handgrips: 4+/5 right, 5/5 left  Intrinsics: 5/5 right, 5/5 left  Extensors: 5/5 right, 5/5 left  Thumb abduction/adduction 4/5 right, 5/5 left  Hip flexors: 5/5 right, 5/5 left  Quadriceps: 5/5 right, 5/5 left  Hamstrings: 5/5 right, 5/5 left  Dorsiflexion: 5/5 right, 5/5 left  Plantarflexion: 5/5 right, 5/5 left    Sensation is decreased to light touch in right thumb, anterior forearm. Otherwise  intact to light touch joan upper and lower extremities.    Neck ROM is normal  Incision is nicely healed      Imaging:  Personally reviewed cervical XRays. Images also reviewed by Dr Mckeon  Hardware appears stable without complication   Straightening of the normal cervical lordosis however compared to preop alignment is improved. Preop XR demonstrated kyphosis.       Sherry Franklin FNP-C  [facility]  O. [phone]

## 2021-08-12 NOTE — LETTER
8/12/2021         RE: Nora Jay  [address] [address]        Dear Colleague,    Thank you for referring your patient, Nora Jay, to the [facility]. Please see a copy of my visit note below.    Neurosurgery progress note:    A/P:  Leila is a 47yo F who is approx 6 mos postop s/p Anterior cervical discectomy and arthrodesis cervical 5-cervical 6 by Dr Mckeon on 2/16/21. Preoperatively complained of right arm pain, numbness and tingling into the thumb.    Leila is overall improving. Her right arm pain has not returned. R thumb/hand weakness, numbness and tingling is a little better. She reports posterior neck soreness, worse with activity and over the course of the day. She has consistent difficulty getting to sleep at night. Taking tylenol prn, using ice. Found that posture tape helps. She is still doing PT once a week. Taking flexeril very sparingly because side effects do not agree with her. Cervical XRays today show stable hardware without complication. Does have some straightening of normal cervical lordosis suggestive of ongoing muscle spasms, but overall alignment is much improved from preop. Discussed with Dr Mckeon who recommends referral for Trigger Point Injections and ongoing PT to continue to build strength of her posterior neck muscles. Continue to hold off on NSAIDs at this point bc not fused yet.     Plan:  1. Spine center referral for trigger point injections  2. Continue PT exercises  3. Return in 3 mos with new cervical XR to see Dr Mckeon (Per Dr Mckeon)    Left voicemail on pt's machine to call us back to discuss final plan (above) or     Exam:  General: seated in NAD, appears comfortable    Strength:  Deltoids: 5/5 right, 5/5 left  Triceps: 5/5 right, 5/5 left  Biceps: 5/5 right, 5/5 left  Handgrips: 4+/5 right, 5/5 left  Intrinsics: 5/5 right, 5/5 left  Extensors: 5/5 right, 5/5 left  Thumb abduction/adduction 4/5 right, 5/5  left  Hip flexors: 5/5 right, 5/5 left  Quadriceps: 5/5 right, 5/5 left  Hamstrings: 5/5 right, 5/5 left  Dorsiflexion: 5/5 right, 5/5 left  Plantarflexion: 5/5 right, 5/5 left    Sensation is decreased to light touch in right thumb, anterior forearm. Otherwise intact to light touch joan upper and lower extremities.    Neck ROM is normal  Incision is nicely healed      Imaging:  Personally reviewed cervical XRays. Images also reviewed by Dr Mckeon  Hardware appears stable without complication   Straightening of the normal cervical lordosis however compared to preop alignment is improved. Preop XR demonstrated kyphosis.       Sherry CALDWELL-C  [facility]  O. [phone]        Again, thank you for allowing me to participate in the care of your patient.        Sincerely,        PAOLA Manzanares CNP

## 2021-08-13 NOTE — PATIENT INSTRUCTIONS
F/u in 3 mos with new cervical XR to see Dr Mckeon  Referral to spine for trigger point injections to help loosen the tight posterior neck muscles

## 2021-08-17 ENCOUNTER — MEDICAL CORRESPONDENCE (OUTPATIENT)
Dept: NEUROSURGERY | Facility: CLINIC | Age: 46
End: 2021-08-17

## 2021-08-30 ENCOUNTER — OFFICE VISIT (OUTPATIENT)
Dept: PHYSICAL MEDICINE AND REHAB | Facility: CLINIC | Age: 46
End: 2021-08-30
Payer: COMMERCIAL

## 2021-08-30 VITALS — TEMPERATURE: 98.5 F | SYSTOLIC BLOOD PRESSURE: 136 MMHG | HEART RATE: 91 BPM | DIASTOLIC BLOOD PRESSURE: 77 MMHG

## 2021-08-30 DIAGNOSIS — Z98.1 S/P CERVICAL SPINAL FUSION: ICD-10-CM

## 2021-08-30 DIAGNOSIS — M54.12 CERVICAL RADICULOPATHY: ICD-10-CM

## 2021-08-30 DIAGNOSIS — M79.18 MYOFASCIAL PAIN: Primary | ICD-10-CM

## 2021-08-30 PROCEDURE — 99214 OFFICE O/P EST MOD 30 MIN: CPT | Performed by: PAIN MEDICINE

## 2021-08-30 ASSESSMENT — PAIN SCALES - GENERAL: PAINLEVEL: MODERATE PAIN (4)

## 2021-08-30 NOTE — PROGRESS NOTES
Assessment:     Diagnoses and all orders for this visit:  Myofascial pain  -     PAIN US Trigger Point Injection One to Two Muscles; Future  S/P cervical spinal fusion  -     Spine Referral  Cervical radiculopathy  -     Spine Referral     Nora Jay is a 46 year old y.o. female with past medical history significant for herpes simplex type I, migraine headache, ankle sprain, fibroids who presents today for follow-up regarding neck pain:     -Overall patient's physical exam is reassuring that she has normal strength in her upper extremities.  Pain is likely secondary to myofascial type pain.  She has had improvement of right arm pain from cervical radiculopathy with her surgery.    Plan:     A shared decision making plan was used.  The patient's values and choices were respected. Prior medical records from our last visit on 2/3/2021 as well as neurosurgery notes were reviewed today. The following represents what was discussed and decided upon by the provider and the patient.     -DIAGNOSTIC TESTS: Images were personally reviewed and interpreted.   --MRI of the cervical spine dated 2/2/2021 is personally viewed images interpreted discussed with patient.  There is a moderate sized right paracentral disc protrusion that is more rightward.  Cystlike lesion in the left thyroid with thyroid ultrasound recommended for further evaluation.     -INTERVENTIONS:  I ordered right trigger point injection of the trapezial muscle under ultrasound guidance.    -MEDICATIONS: No changes to medications.  -  Discussed side effects of medications and proper use. Patient verbalized understanding.    -PHYSICAL THERAPY: Recommend that she continue with physical therapy.  Discussed the importance of core strengthening, ROM, stretching exercises with the patient and how each of these entities is important in decreasing pain.  Explained to the patient that the purpose of physical therapy is to teach the patient a home exercise program.   These exercises need to be performed every day in order to decrease pain and prevent future occurrences of pain.        -PATIENT EDUCATION: We discussed pain management in a multiple fashion including physical therapy, medication management, possible future injections.    -FOLLOW UP: Patient will follow up 2 weeks after injections.  Advised to contact clinic if symptoms worsen or change.    Subjective:     Nora Jay is a 46 year old female who presents today for follow-up regarding right-sided neck pain.  Patient reports that she is doing much better with her arm pain after surgery, however she does have residual continuing neck pain that is worse on the right side.  She notes there is a spot if she pushes on that sensation zinger type sensation.  Her pain today is 4/10 is worst is 6/10 is best is 2/10.  She notes that she has been having trouble sleeping and finding comfortable position.  Also she is sitting or standing and working on posture for long amount of times she finds that her pain worsens.  Ice and Tylenol have been helpful.  Kinesiotape at physical therapy and physical therapy sessions have also been helpful.  She denies any bowel or bladder changes, fevers, chills, unintentional weight loss.    No myelopathic symptoms.     Evaluation to Date: MRI of the cervical spine dated 2/2/2021.    Treatment to Date: Right C5-6 transforaminal epidural steroid injection done on 2/4/2021.  Anterior right C5-6 fusion on 2/16/2021 with Dr. Mckeon    Patient Active Problem List   Diagnosis     Herpes Simplex Type I     Migraine Headache     Ankle Sprain     Fibroids, subserous     Cervical radiculopathy     Cervical stenosis of spine     Cervical stenosis of spinal canal       Current Outpatient Medications   Medication     acetaminophen (TYLENOL) 500 MG tablet     cyclobenzaprine (FLEXERIL) 10 MG tablet     methocarbamoL (ROBAXIN) 500 MG tablet     valACYclovir (VALTREX) 500 MG tablet     valACYclovir (VALTREX)  500 MG tablet     No current facility-administered medications for this visit.       Allergies   Allergen Reactions     Venom-Honey Bee [Bee Venom] Anaphylaxis     Penicillins Hives     Iohexol Rash     Post 10/29/15 CT IV contrast injection pt. Noted rash on stomach at home.  Unknown to what caused this/thought possible IV contrast reaction.       Past Medical History:   Diagnosis Date     Acute neck pain     radiating down right arm     Fibroids         Review of Systems  ROS: Specifically negative for bowel/bladder dysfunction, balance changes, headache, dizziness, foot drop, fevers, chills, appetite changes, nausea/vomiting, unexplained weight loss. Otherwise 13 systems reviewed are negative. Please see the patient's intake questionnaire from today for details.    Reviewed Social, Family, Past Medical and Past Surgical history with patient, no significant changes noted since prior visit.     Objective:     /77   Pulse 91   Temp 98.5  F (36.9  C)     PHYSICAL EXAMINATION:   --CONSTITUTIONAL: Vital signs as above. No acute distress. The patient is well nourished and well groomed.  --PSYCHIATRIC:  The patient is awake, alert, oriented to person, place, time and answering questions appropriately with clear speech. Appropriate mood and affect   --HEENT: Sclera are non-injected. Extraocular muscles are intact.   --SKIN: Skin over the face, bilateral upper extremities, and posterior torso is clean, dry, intact without rashes.  --RESPIRATORY: Normal rhythm and effort. No abnormal accessory muscle breathing patterns noted.   --GROSS MOTOR: Easily arises from a seated position.   --CERVICAL SPINE: Inspection reveals no evidence of deformity. Range of motion is mildly limited in cervical flexion, extension, lateral rotation.  Tenderness palpation over the right trapezial muscle.   --UPPER EXTREMITY MOTOR TESTING:  Wrist flexion left 5/5, right 5/5  Wrist extension left 5/5, right 5/5  Pronators left 5/5, right  5/5  Biceps left 5/5, right 5/5   Triceps left 5/5, right 5/5   Shoulder abduction left 5/5, right 5/5   left 5/5, right 5/5  --NEUROLOGIC: Decreased sensation to light touch over her right thumb and forearm.    Imaging of the cervical spine: Cervical spine imaging was reviewed today. The images were shown to the patient and the findings were explained using a spine model.    XR Cervical Spine 2/3 Views    Result Date: 8/12/2021  EXAM: XR CERVICAL SPINE 2/3 VWS LOCATION: [facility] DATE/TIME: 8/12/2021 10:51 AM INDICATION: S/P C5-6 ACDF; AP/LAT upright COMPARISON: Cervical spine x-ray series 3/30/2021. TECHNIQUE: CR Cervical Spine.     IMPRESSION: ACDF hardware at C5-C6 again noted. No evidence for hardware failure, loosening or migration since the comparison study. No definite solid bony fusion noted. There is normal alignment of the cervical vertebrae; however, there is straightening  of normal cervical lordosis. Vertebral body heights of the cervical spine are normal. Craniocervical alignment is normal. No evidence for fracture.

## 2021-08-30 NOTE — PATIENT INSTRUCTIONS
I ordered a right trapezial trigger point injection under ultrasound guidance.    ~Please call Nurse Navigation line [phone] with any questions or concerns about your treatment plan, if symptoms worsen and you would like to be seen urgently, or if you have problems controlling bladder and bowel function.

## 2021-08-30 NOTE — LETTER
8/30/2021         RE: Nora Jay  [address] [address]        Dear Colleague,    Thank you for referring your patient, Nora Jay, to the [facility] [place]. Please see a copy of my visit note below.      Assessment:     Diagnoses and all orders for this visit:  Myofascial pain  -     PAIN US Trigger Point Injection One to Two Muscles; Future  S/P cervical spinal fusion  -     Spine Referral  Cervical radiculopathy  -     Spine Referral     Nora Jay is a 46 year old y.o. female with past medical history significant for herpes simplex type I, migraine headache, ankle sprain, fibroids who presents today for follow-up regarding neck pain:     -Overall patient's physical exam is reassuring that she has normal strength in her upper extremities.  Pain is likely secondary to myofascial type pain.  She has had improvement of right arm pain from cervical radiculopathy with her surgery.    Plan:     A shared decision making plan was used.  The patient's values and choices were respected. Prior medical records from our last visit on 2/3/2021 as well as neurosurgery notes were reviewed today. The following represents what was discussed and decided upon by the provider and the patient.     -DIAGNOSTIC TESTS: Images were personally reviewed and interpreted.   --MRI of the cervical spine dated 2/2/2021 is personally viewed images interpreted discussed with patient.  There is a moderate sized right paracentral disc protrusion that is more rightward.  Cystlike lesion in the left thyroid with thyroid ultrasound recommended for further evaluation.     -INTERVENTIONS:  I ordered right trigger point injection of the trapezial muscle under ultrasound guidance.    -MEDICATIONS: No changes to medications.  -  Discussed side effects of medications and proper use. Patient verbalized understanding.    -PHYSICAL THERAPY: Recommend that she continue with physical therapy.  Discussed the  importance of core strengthening, ROM, stretching exercises with the patient and how each of these entities is important in decreasing pain.  Explained to the patient that the purpose of physical therapy is to teach the patient a home exercise program.  These exercises need to be performed every day in order to decrease pain and prevent future occurrences of pain.        -PATIENT EDUCATION: We discussed pain management in a multiple fashion including physical therapy, medication management, possible future injections.    -FOLLOW UP: Patient will follow up 2 weeks after injections.  Advised to contact clinic if symptoms worsen or change.    Subjective:     Nora Jay is a 46 year old female who presents today for follow-up regarding right-sided neck pain.  Patient reports that she is doing much better with her arm pain after surgery, however she does have residual continuing neck pain that is worse on the right side.  She notes there is a spot if she pushes on that sensation zinger type sensation.  Her pain today is 4/10 is worst is 6/10 is best is 2/10.  She notes that she has been having trouble sleeping and finding comfortable position.  Also she is sitting or standing and working on posture for long amount of times she finds that her pain worsens.  Ice and Tylenol have been helpful.  Kinesiotape at physical therapy and physical therapy sessions have also been helpful.  She denies any bowel or bladder changes, fevers, chills, unintentional weight loss.    No myelopathic symptoms.     Evaluation to Date: MRI of the cervical spine dated 2/2/2021.    Treatment to Date: Right C5-6 transforaminal epidural steroid injection done on 2/4/2021.  Anterior right C5-6 fusion on 2/16/2021 with Dr. Mckeon    Patient Active Problem List   Diagnosis     Herpes Simplex Type I     Migraine Headache     Ankle Sprain     Fibroids, subserous     Cervical radiculopathy     Cervical stenosis of spine     Cervical stenosis of spinal  canal       Current Outpatient Medications   Medication     acetaminophen (TYLENOL) 500 MG tablet     cyclobenzaprine (FLEXERIL) 10 MG tablet     methocarbamoL (ROBAXIN) 500 MG tablet     valACYclovir (VALTREX) 500 MG tablet     valACYclovir (VALTREX) 500 MG tablet     No current facility-administered medications for this visit.       Allergies   Allergen Reactions     Venom-Honey Bee [Bee Venom] Anaphylaxis     Penicillins Hives     Iohexol Rash     Post 10/29/15 CT IV contrast injection pt. Noted rash on stomach at home.  Unknown to what caused this/thought possible IV contrast reaction.       Past Medical History:   Diagnosis Date     Acute neck pain     radiating down right arm     Fibroids         Review of Systems  ROS: Specifically negative for bowel/bladder dysfunction, balance changes, headache, dizziness, foot drop, fevers, chills, appetite changes, nausea/vomiting, unexplained weight loss. Otherwise 13 systems reviewed are negative. Please see the patient's intake questionnaire from today for details.    Reviewed Social, Family, Past Medical and Past Surgical history with patient, no significant changes noted since prior visit.     Objective:     /77   Pulse 91   Temp 98.5  F (36.9  C)     PHYSICAL EXAMINATION:   --CONSTITUTIONAL: Vital signs as above. No acute distress. The patient is well nourished and well groomed.  --PSYCHIATRIC:  The patient is awake, alert, oriented to person, place, time and answering questions appropriately with clear speech. Appropriate mood and affect   --HEENT: Sclera are non-injected. Extraocular muscles are intact.   --SKIN: Skin over the face, bilateral upper extremities, and posterior torso is clean, dry, intact without rashes.  --RESPIRATORY: Normal rhythm and effort. No abnormal accessory muscle breathing patterns noted.   --GROSS MOTOR: Easily arises from a seated position.   --CERVICAL SPINE: Inspection reveals no evidence of deformity. Range of motion is mildly  limited in cervical flexion, extension, lateral rotation.  Tenderness palpation over the right trapezial muscle.   --UPPER EXTREMITY MOTOR TESTING:  Wrist flexion left 5/5, right 5/5  Wrist extension left 5/5, right 5/5  Pronators left 5/5, right 5/5  Biceps left 5/5, right 5/5   Triceps left 5/5, right 5/5   Shoulder abduction left 5/5, right 5/5   left 5/5, right 5/5  --NEUROLOGIC: Decreased sensation to light touch over her right thumb and forearm.    Imaging of the cervical spine: Cervical spine imaging was reviewed today. The images were shown to the patient and the findings were explained using a spine model.    XR Cervical Spine 2/3 Views    Result Date: 8/12/2021  EXAM: XR CERVICAL SPINE 2/3 VWS LOCATION: [facility] DATE/TIME: 8/12/2021 10:51 AM INDICATION: S/P C5-6 ACDF; AP/LAT upright COMPARISON: Cervical spine x-ray series 3/30/2021. TECHNIQUE: CR Cervical Spine.     IMPRESSION: ACDF hardware at C5-C6 again noted. No evidence for hardware failure, loosening or migration since the comparison study. No definite solid bony fusion noted. There is normal alignment of the cervical vertebrae; however, there is straightening  of normal cervical lordosis. Vertebral body heights of the cervical spine are normal. Craniocervical alignment is normal. No evidence for fracture.                       Again, thank you for allowing me to participate in the care of your patient.        Sincerely,        Anibal Espinoza, DO

## 2021-09-09 ENCOUNTER — TRANSFERRED RECORDS (OUTPATIENT)
Dept: NEUROSURGERY | Facility: CLINIC | Age: 46
End: 2021-09-09

## 2021-09-10 ENCOUNTER — ANCILLARY PROCEDURE (OUTPATIENT)
Dept: PHYSICAL MEDICINE AND REHAB | Facility: CLINIC | Age: 46
End: 2021-09-10
Attending: PAIN MEDICINE
Payer: COMMERCIAL

## 2021-09-10 VITALS
SYSTOLIC BLOOD PRESSURE: 112 MMHG | OXYGEN SATURATION: 99 % | HEART RATE: 109 BPM | TEMPERATURE: 98.6 F | DIASTOLIC BLOOD PRESSURE: 68 MMHG

## 2021-09-10 DIAGNOSIS — M79.18 MYOFASCIAL PAIN: ICD-10-CM

## 2021-09-10 DIAGNOSIS — M54.12 CERVICAL RADICULOPATHY: ICD-10-CM

## 2021-09-10 PROCEDURE — 76942 ECHO GUIDE FOR BIOPSY: CPT | Performed by: PAIN MEDICINE

## 2021-09-10 PROCEDURE — 20552 NJX 1/MLT TRIGGER POINT 1/2: CPT | Performed by: PAIN MEDICINE

## 2021-09-10 RX ORDER — METHOCARBAMOL 500 MG/1
500 TABLET, FILM COATED ORAL 3 TIMES DAILY
Qty: 90 TABLET | Refills: 1 | Status: SHIPPED | OUTPATIENT
Start: 2021-09-10 | End: 2021-12-22

## 2021-09-10 RX ORDER — METHYLPREDNISOLONE ACETATE 40 MG/ML
INJECTION, SUSPENSION INTRA-ARTICULAR; INTRALESIONAL; INTRAMUSCULAR; SOFT TISSUE
Status: COMPLETED | OUTPATIENT
Start: 2021-09-10 | End: 2021-09-10

## 2021-09-10 RX ORDER — LIDOCAINE HYDROCHLORIDE 10 MG/ML
INJECTION, SOLUTION EPIDURAL; INFILTRATION; INTRACAUDAL; PERINEURAL
Status: COMPLETED | OUTPATIENT
Start: 2021-09-10 | End: 2021-09-10

## 2021-09-10 RX ORDER — BUPIVACAINE HYDROCHLORIDE 2.5 MG/ML
INJECTION, SOLUTION EPIDURAL; INFILTRATION; INTRACAUDAL
Status: COMPLETED | OUTPATIENT
Start: 2021-09-10 | End: 2021-09-10

## 2021-09-10 RX ORDER — LIDOCAINE HYDROCHLORIDE 20 MG/ML
INJECTION, SOLUTION EPIDURAL; INFILTRATION; INTRACAUDAL; PERINEURAL
Status: COMPLETED | OUTPATIENT
Start: 2021-09-10 | End: 2021-09-10

## 2021-09-10 RX ADMIN — LIDOCAINE HYDROCHLORIDE 2 ML: 10 INJECTION, SOLUTION EPIDURAL; INFILTRATION; INTRACAUDAL; PERINEURAL at 09:23

## 2021-09-10 RX ADMIN — METHYLPREDNISOLONE ACETATE 10 MG: 40 INJECTION, SUSPENSION INTRA-ARTICULAR; INTRALESIONAL; INTRAMUSCULAR; SOFT TISSUE at 09:25

## 2021-09-10 RX ADMIN — LIDOCAINE HYDROCHLORIDE 0.75 ML: 20 INJECTION, SOLUTION EPIDURAL; INFILTRATION; INTRACAUDAL; PERINEURAL at 09:26

## 2021-09-10 RX ADMIN — BUPIVACAINE HYDROCHLORIDE 2 ML: 2.5 INJECTION, SOLUTION EPIDURAL; INFILTRATION; INTRACAUDAL at 09:27

## 2021-09-10 ASSESSMENT — PAIN SCALES - GENERAL: PAINLEVEL: MILD PAIN (2)

## 2021-09-10 NOTE — PATIENT INSTRUCTIONS
Please follow up two weeks post procedure with Dr Espinoza to evaluate your plan of care.      DISCHARGE INSTRUCTIONS    During office hours (8:00 a.m.- 4:00 p.m.) questions or concerns may be answered  by calling Spine Center Navigation Nurses at  [phone].  Messages received after hours will be returned the following business day.      In the case of an emergency, please dial 911 or seek assistance at the nearest Emergency Room/Urgent Care facility.     All Patients:  ? You may experience an increase in your symptoms for the first 2 days (It may take anywhere between 2 days- 2 weeks for the steroid to have maximum effect).    ? You may use ice on the injection site, as frequently as 20 minutes each hour if needed.    ? You may take your pain medicine.    ? You may continue taking your regular medication.    ? You may shower. No swimming, tub bath or hot tub for 48 hours.  You may remove your bandaid/bandage as soon as you are home.    ? You may resume light activities, as tolerated.    ? Resume your usual diet as tolerated.      POSSIBLE STEROID SIDE EFFECTS (If steroid/cortisone was used for your procedure)    -If you experience these symptoms, it should only last for a short period      Swelling of the legs                Skin redness (flushing)       Mouth (oral) irritation     Blood sugar (glucose) levels              Sweats                     Mood changes    Headache    Weakened immune system for up to 14 days, which could increase the risk of sudhir the COVID-19 virus and/or experiencing more severe symptoms of the disease, if exposed.         POSSIBLE PROCEDURE SIDE EFFECTS    -Call the Spine Center if you are concerned      Increased Pain             Increased numbness/tingling        Nausea/Vomiting            Bruising/bleeding at site        Redness or swelling                                                Difficulty walking        Weakness            Fever greater than 100.5    *In the  event of a severe headache after an epidural steroid injection that is relieved by lying down, please call the [facility] to speak with a clinical staff member*

## 2021-09-24 ENCOUNTER — OFFICE VISIT (OUTPATIENT)
Dept: PHYSICAL MEDICINE AND REHAB | Facility: CLINIC | Age: 46
End: 2021-09-24
Payer: COMMERCIAL

## 2021-09-24 VITALS — HEART RATE: 89 BPM | TEMPERATURE: 99 F | SYSTOLIC BLOOD PRESSURE: 138 MMHG | DIASTOLIC BLOOD PRESSURE: 73 MMHG

## 2021-09-24 DIAGNOSIS — M54.12 CERVICAL RADICULOPATHY: ICD-10-CM

## 2021-09-24 DIAGNOSIS — M79.18 MYOFASCIAL PAIN: Primary | ICD-10-CM

## 2021-09-24 DIAGNOSIS — Z98.1 S/P CERVICAL SPINAL FUSION: ICD-10-CM

## 2021-09-24 PROCEDURE — 99213 OFFICE O/P EST LOW 20 MIN: CPT | Performed by: PAIN MEDICINE

## 2021-09-24 ASSESSMENT — PAIN SCALES - GENERAL: PAINLEVEL: MILD PAIN (3)

## 2021-09-24 NOTE — PATIENT INSTRUCTIONS
I recommend that you continue with your physical therapy.    ~Please call Nurse Navigation line [phone] with any questions or concerns about your treatment plan, if symptoms worsen and you would like to be seen urgently, or if you have problems controlling bladder and bowel function.

## 2021-09-24 NOTE — LETTER
9/24/2021         RE: Nora Jay  [address] [facility] [address]        Dear Colleague,    Thank you for referring your patient, Nora Jay, to the [facility] [place]. Please see a copy of my visit note below.      Assessment:     Diagnoses and all orders for this visit:  Myofascial pain  S/P cervical spinal fusion  Cervical radiculopathy     Nora Jay is a 46 year old y.o. female with past medical history significant for herpes simplex type I, migraine headache, ankle sprain, fibroids who presents today for follow-up regarding neck pain:     -Patient received 65 to 70% relief with right trapezial muscle trigger point injection.  Residual neck pain is likely myofascial in nature.    Plan:     A shared decision making plan was used.  The patient's values and choices were respected. Prior medical records from our last visit on 8/30/2021 were reviewed today. The following represents what was discussed and decided upon by the provider and the patient.     -DIAGNOSTIC TESTS: Images were personally reviewed and interpreted.   --MRI of the cervical spine dated 2/2/2021 is personally viewed images interpreted discussed with patient.  There is a moderate sized right paracentral disc protrusion that is more rightward.  Cystlike lesion in the left thyroid with thyroid ultrasound recommended for further evaluation.         -INTERVENTIONS: No interventions at this time.    -MEDICATIONS: No changes to medications.  -  Discussed side effects of medications and proper use. Patient verbalized understanding.    -PHYSICAL THERAPY: Recommend that she continue with physical therapy sessions.  Discussed the importance of core strengthening, ROM, stretching exercises with the patient and how each of these entities is important in decreasing pain.  Explained to the patient that the purpose of physical therapy is to teach the patient a home exercise program.  These exercises need to be performed  every day in order to decrease pain and prevent future occurrences of pain.        -PATIENT EDUCATION: We discussed pain management in a multimodal fashion including physical therapy, medication management, possible future injections.    -FOLLOW UP: Patient will follow up in 3 months in person.  Advised to contact clinic if symptoms worsen or change.    Subjective:     Nora Jay is a 46 year old female who presents today for follow-up regarding neck pain.  Patient reports that she received 65 to 70% relief with her right trapezial muscle trigger point injection.  She notes that it took roughly 10 to 12 days before she started feeling relief, however thereafter she has been feeling much better.  She is not feeling a senior type pain that she was feeling.  Her pain today is 3/10 is worst is 7/10 is best is 3/10.  Her pain is worse with overuse as well as looking up first extending.  At the time.  Pain is improved with ice, Tylenol, methocarbamol.  Rest is also helpful.  She denies any bowel or bladder changes, fevers, chills, unintentional weight loss.  Pain is now aching in nature.    No myelopathic symptoms.     Evaluation to Date: MRI of the cervical spine dated 2/2/2021.     Treatment to Date: Right C5-6 transforaminal epidural steroid injection done on 2/4/2021.  Anterior right C5-6 fusion on 2/16/2021 with Dr. Mckeon.  Right trapezial muscle trigger point injection done on 9/10/2021.    Patient Active Problem List   Diagnosis     Herpes Simplex Type I     Migraine Headache     Ankle Sprain     Fibroids, subserous     Cervical radiculopathy     Cervical stenosis of spine     Cervical stenosis of spinal canal       Current Outpatient Medications   Medication     acetaminophen (TYLENOL) 500 MG tablet     methocarbamol (ROBAXIN) 500 MG tablet     valACYclovir (VALTREX) 500 MG tablet     valACYclovir (VALTREX) 500 MG tablet     No current facility-administered medications for this visit.       Allergies    Allergen Reactions     Venom-Honey Bee [Bee Venom] Anaphylaxis     Penicillins Hives     Iohexol Rash     Post 10/29/15 CT IV contrast injection pt. Noted rash on stomach at home.  Unknown to what caused this/thought possible IV contrast reaction.       Past Medical History:   Diagnosis Date     Acute neck pain     radiating down right arm     Fibroids         Review of Systems  ROS: Specifically negative for bowel/bladder dysfunction, balance changes, headache, dizziness, foot drop, fevers, chills, appetite changes, nausea/vomiting, unexplained weight loss. Otherwise 13 systems reviewed are negative. Please see the patient's intake questionnaire from today for details.    Reviewed Social, Family, Past Medical and Past Surgical history with patient, no significant changes noted since prior visit.     Objective:     /73   Pulse 89   Temp 99  F (37.2  C)     PHYSICAL EXAMINATION:   --CONSTITUTIONAL: Vital signs as above. No acute distress. The patient is well nourished and well groomed.  --PSYCHIATRIC:  The patient is awake, alert, oriented to person, place, time and answering questions appropriately with clear speech. Appropriate mood and affect   --HEENT: Sclera are non-injected.   --SKIN: Skin over the face is clean, dry, intact without rashes.  --RESPIRATORY: Normal rhythm and effort. No abnormal accessory muscle breathing patterns noted.   --GROSS MOTOR: Easily arises from a seated position.   --CERVICAL SPINE: Inspection reveals no evidence of deformity. Range of motion is mildly limited in cervical flexion, extension, lateral rotation.  Mild tenderness palpation across the trapezial muscles.  --UPPER EXTREMITY MOTOR TESTING:  Wrist flexion left 5/5, right 5/5  Wrist extension left 5/5, right 5/5  Pronators left 5/5, right 5/5  Biceps left 5/5, right 5/5   Triceps left 5/5, right 5/5   Shoulder abduction left 5/5, right 5/5   left 5/5, right 5/5  --NEUROLOGIC:  Sensation to upper extremities is  intact. Negative Barone's bilaterally.     Imaging of the cervical spine: Cervical spine imaging was reviewed today. The images were shown to the patient and the findings were explained using a spine model.    PAIN US Trigger Point Injection One to Two Muscles    Result Date: 9/10/2021  Procedure:  Ultrasound-guided bilateral trapezial muscle trigger point injection(s). Pre Procedure Diagnosis:  Myofascial pain Post Procedure Diagnosis:  Same Procedure Performed:  Lateral trapezial muscle trigger point injections with Ultrasound Guidance Clinical Scenario:  As per office notes Vital Signs:  As per rooming/preprocedure documentation Side Injected: Bilateral After discussing the risks, benefits, and alternatives to the procedure, the patient expressed understanding and wished to proceed.  The patient was brought to the procedure suite and placed in the prone position.  A procedural pause was conducted to verify:  correct patient identity, procedure to be performed and as applicable, correct side and site, correct patient position, and availability of implants, special equipment or special requirements.  A simple surgical tray was used.  Prior to the procedure, the trapezial muscles was examined with a 12 MHz linear transducer to visualize the bilateral trapezial muscles and determine the optimal needle path. Following this, the area was prepared with a ChloraPrep scrub, then re-examined using the same transducer, a sterile ultrasound transducer cover, and sterile ultrasound transducer gel.  Local anesthesia was obtained with 2 cc of 1% lidocaine. Thereafter, using ultrasound guidance, a 2 inch 25-gauge needle was advanced into the bilateral trapezial muscles. After visualization of the tip and negative aspiration for blood, a mixture of 10 mg of Depo-Medrol, 1.75 cc of 2% lidocaine and 2 cc of 0.25% bupivacaine was injected into the lateral trapezial muscles and split equally. Following the injection, the needle was  withdrawn.  The patient tolerated the procedure well and there were no apparent complications.  After an appropriate amount of observation, the patient was dismissed from the clinic in good condition under their own power. Pre-Pain Score:  4/10 Post-Pain Score:  2/10 The patient will follow up with Dr. Espinoza in 2-4 weeks.                       Again, thank you for allowing me to participate in the care of your patient.        Sincerely,        Anibal Espinoza, DO

## 2021-09-24 NOTE — PROGRESS NOTES
Assessment:     Diagnoses and all orders for this visit:  Myofascial pain  S/P cervical spinal fusion  Cervical radiculopathy     Nora Jay is a 46 year old y.o. female with past medical history significant for herpes simplex type I, migraine headache, ankle sprain, fibroids who presents today for follow-up regarding neck pain:     -Patient received 65 to 70% relief with right trapezial muscle trigger point injection.  Residual neck pain is likely myofascial in nature.    Plan:     A shared decision making plan was used.  The patient's values and choices were respected. Prior medical records from our last visit on 8/30/2021 were reviewed today. The following represents what was discussed and decided upon by the provider and the patient.     -DIAGNOSTIC TESTS: Images were personally reviewed and interpreted.   --MRI of the cervical spine dated 2/2/2021 is personally viewed images interpreted discussed with patient.  There is a moderate sized right paracentral disc protrusion that is more rightward.  Cystlike lesion in the left thyroid with thyroid ultrasound recommended for further evaluation.         -INTERVENTIONS: No interventions at this time.    -MEDICATIONS: No changes to medications.  -  Discussed side effects of medications and proper use. Patient verbalized understanding.    -PHYSICAL THERAPY: Recommend that she continue with physical therapy sessions.  Discussed the importance of core strengthening, ROM, stretching exercises with the patient and how each of these entities is important in decreasing pain.  Explained to the patient that the purpose of physical therapy is to teach the patient a home exercise program.  These exercises need to be performed every day in order to decrease pain and prevent future occurrences of pain.        -PATIENT EDUCATION: We discussed pain management in a multimodal fashion including physical therapy, medication management, possible future injections.    -FOLLOW UP:  Patient will follow up in 3 months in person.  Advised to contact clinic if symptoms worsen or change.    Subjective:     Nora Jay is a 46 year old female who presents today for follow-up regarding neck pain.  Patient reports that she received 65 to 70% relief with her right trapezial muscle trigger point injection.  She notes that it took roughly 10 to 12 days before she started feeling relief, however thereafter she has been feeling much better.  She is not feeling a senior type pain that she was feeling.  Her pain today is 3/10 is worst is 7/10 is best is 3/10.  Her pain is worse with overuse as well as looking up first extending.  At the time.  Pain is improved with ice, Tylenol, methocarbamol.  Rest is also helpful.  She denies any bowel or bladder changes, fevers, chills, unintentional weight loss.  Pain is now aching in nature.    No myelopathic symptoms.     Evaluation to Date: MRI of the cervical spine dated 2/2/2021.     Treatment to Date: Right C5-6 transforaminal epidural steroid injection done on 2/4/2021.  Anterior right C5-6 fusion on 2/16/2021 with Dr. Mckeon.  Right trapezial muscle trigger point injection done on 9/10/2021.    Patient Active Problem List   Diagnosis     Herpes Simplex Type I     Migraine Headache     Ankle Sprain     Fibroids, subserous     Cervical radiculopathy     Cervical stenosis of spine     Cervical stenosis of spinal canal       Current Outpatient Medications   Medication     acetaminophen (TYLENOL) 500 MG tablet     methocarbamol (ROBAXIN) 500 MG tablet     valACYclovir (VALTREX) 500 MG tablet     valACYclovir (VALTREX) 500 MG tablet     No current facility-administered medications for this visit.       Allergies   Allergen Reactions     Venom-Honey Bee [Bee Venom] Anaphylaxis     Penicillins Hives     Iohexol Rash     Post 10/29/15 CT IV contrast injection pt. Noted rash on stomach at home.  Unknown to what caused this/thought possible IV contrast reaction.        Past Medical History:   Diagnosis Date     Acute neck pain     radiating down right arm     Fibroids         Review of Systems  ROS: Specifically negative for bowel/bladder dysfunction, balance changes, headache, dizziness, foot drop, fevers, chills, appetite changes, nausea/vomiting, unexplained weight loss. Otherwise 13 systems reviewed are negative. Please see the patient's intake questionnaire from today for details.    Reviewed Social, Family, Past Medical and Past Surgical history with patient, no significant changes noted since prior visit.     Objective:     /73   Pulse 89   Temp 99  F (37.2  C)     PHYSICAL EXAMINATION:   --CONSTITUTIONAL: Vital signs as above. No acute distress. The patient is well nourished and well groomed.  --PSYCHIATRIC:  The patient is awake, alert, oriented to person, place, time and answering questions appropriately with clear speech. Appropriate mood and affect   --HEENT: Sclera are non-injected.   --SKIN: Skin over the face is clean, dry, intact without rashes.  --RESPIRATORY: Normal rhythm and effort. No abnormal accessory muscle breathing patterns noted.   --GROSS MOTOR: Easily arises from a seated position.   --CERVICAL SPINE: Inspection reveals no evidence of deformity. Range of motion is mildly limited in cervical flexion, extension, lateral rotation.  Mild tenderness palpation across the trapezial muscles.  --UPPER EXTREMITY MOTOR TESTING:  Wrist flexion left 5/5, right 5/5  Wrist extension left 5/5, right 5/5  Pronators left 5/5, right 5/5  Biceps left 5/5, right 5/5   Triceps left 5/5, right 5/5   Shoulder abduction left 5/5, right 5/5   left 5/5, right 5/5  --NEUROLOGIC:  Sensation to upper extremities is intact. Negative Barone's bilaterally.     Imaging of the cervical spine: Cervical spine imaging was reviewed today. The images were shown to the patient and the findings were explained using a spine model.    PAIN US Trigger Point Injection One to Two  Muscles    Result Date: 9/10/2021  Procedure:  Ultrasound-guided bilateral trapezial muscle trigger point injection(s). Pre Procedure Diagnosis:  Myofascial pain Post Procedure Diagnosis:  Same Procedure Performed:  Lateral trapezial muscle trigger point injections with Ultrasound Guidance Clinical Scenario:  As per office notes Vital Signs:  As per rooming/preprocedure documentation Side Injected: Bilateral After discussing the risks, benefits, and alternatives to the procedure, the patient expressed understanding and wished to proceed.  The patient was brought to the procedure suite and placed in the prone position.  A procedural pause was conducted to verify:  correct patient identity, procedure to be performed and as applicable, correct side and site, correct patient position, and availability of implants, special equipment or special requirements.  A simple surgical tray was used.  Prior to the procedure, the trapezial muscles was examined with a 12 MHz linear transducer to visualize the bilateral trapezial muscles and determine the optimal needle path. Following this, the area was prepared with a ChloraPrep scrub, then re-examined using the same transducer, a sterile ultrasound transducer cover, and sterile ultrasound transducer gel.  Local anesthesia was obtained with 2 cc of 1% lidocaine. Thereafter, using ultrasound guidance, a 2 inch 25-gauge needle was advanced into the bilateral trapezial muscles. After visualization of the tip and negative aspiration for blood, a mixture of 10 mg of Depo-Medrol, 1.75 cc of 2% lidocaine and 2 cc of 0.25% bupivacaine was injected into the lateral trapezial muscles and split equally. Following the injection, the needle was withdrawn.  The patient tolerated the procedure well and there were no apparent complications.  After an appropriate amount of observation, the patient was dismissed from the clinic in good condition under their own power. Pre-Pain Score:  4/10 Post-Pain  Score:  2/10 The patient will follow up with Dr. Espinoza in 2-4 weeks.

## 2021-10-24 ENCOUNTER — HEALTH MAINTENANCE LETTER (OUTPATIENT)
Age: 46
End: 2021-10-24

## 2021-10-28 ENCOUNTER — MEDICAL CORRESPONDENCE (OUTPATIENT)
Dept: NEUROSURGERY | Facility: CLINIC | Age: 46
End: 2021-10-28

## 2021-11-10 ENCOUNTER — TELEPHONE (OUTPATIENT)
Dept: PHYSICAL MEDICINE AND REHAB | Facility: CLINIC | Age: 46
End: 2021-11-10
Payer: COMMERCIAL

## 2021-11-10 DIAGNOSIS — M79.18 MYOFASCIAL PAIN: Primary | ICD-10-CM

## 2021-11-10 DIAGNOSIS — M54.12 CERVICAL RADICULOPATHY: ICD-10-CM

## 2021-11-10 NOTE — TELEPHONE ENCOUNTER
Phone call to patient to discuss her symptoms and which injection she is referring to. Left message to return call.

## 2021-11-10 NOTE — TELEPHONE ENCOUNTER
Patient would like a repeat injection. Same area. She stated it worked well but her pain is returning.     She has 3 mo s/p injection follow up scheduled w Dr. Espinoza on 12/17/21.

## 2021-11-11 NOTE — TELEPHONE ENCOUNTER
"Patient returned call. Reports she has had a return of the spasms \"electrical zingers at the base of neck where shoulders meet the neck (like before)\" . It is not intolerable, but it is becoming more frequent and noticeable.\" Reports she noticed the return about 2 weeks ago. \"It took about 10 days for the last injections to help, but then it was amazing. I would say at least 80% better.\" Rates pain/spasms 6/10 at present. Is taking the Methocarbamol only at HS. Hoping to have repeat TPIs before end of year.     Explained PSP will be notified of the update and request. Patient will be called with his response. Stated understanding. Per patient, detailed message ok upon call back.      Patient does have a follow up with surgery on 11/16 from her surgery in February. Also has follow up with Spine provider on 12/17/21.   "

## 2021-11-11 NOTE — TELEPHONE ENCOUNTER
Order placed in Epic for repeat right trapezial muscle trigger point injections under ultrasound guidance. Call placed to patient to assist in getting scheduled. Injection requirements reviewed in full with patient. Discussed steroids have immunosuppressant properties which could potentially put patient at a higher risk for a worsened course of any infection including COVID.   Stated understanding. Transferred to scheduling to make appointment.      She reports had flu vaccine in October and is hoping to schedule her COVID vaccination soon. She is aware of the 2 weeks before or after the steroid injection.

## 2021-11-11 NOTE — TELEPHONE ENCOUNTER
Recommend that we repeat right trapezial muscle trigger point injections under ultrasound guidance.  Please order this and have her scheduled.

## 2021-11-16 ENCOUNTER — OFFICE VISIT (OUTPATIENT)
Dept: NEUROSURGERY | Facility: CLINIC | Age: 46
End: 2021-11-16
Payer: COMMERCIAL

## 2021-11-16 VITALS
DIASTOLIC BLOOD PRESSURE: 90 MMHG | RESPIRATION RATE: 16 BRPM | BODY MASS INDEX: 29.41 KG/M2 | SYSTOLIC BLOOD PRESSURE: 140 MMHG | OXYGEN SATURATION: 100 % | HEIGHT: 63 IN | HEART RATE: 92 BPM | WEIGHT: 166 LBS

## 2021-11-16 DIAGNOSIS — Z98.1 S/P CERVICAL SPINAL FUSION: Primary | ICD-10-CM

## 2021-11-16 PROCEDURE — 99213 OFFICE O/P EST LOW 20 MIN: CPT | Performed by: SURGERY

## 2021-11-16 ASSESSMENT — MIFFLIN-ST. JEOR: SCORE: 1362.1

## 2021-11-16 NOTE — NURSING NOTE
Patient states that she is still having trouble sleeping at night. She has been working with PT also.   Ivet Odom,[facility],11:21 AM

## 2021-11-16 NOTE — LETTER
11/16/2021         RE: Nora Jay  [address] [facility] [address]        Dear Colleague,    Thank you for referring your patient, Nora Jay, to the [facility]. Please see a copy of my visit note below.    NEUROSURGERY FOLLOWUP  NOTE    Nora Jay comes today in f/u. Patient is a 47 yo female who is s/p cervical 5-6 anterior cervical decompression and fusion on 2/16/21.S/p trigger point injections on 9/10/21 with Dr Espinoza. Had some initial worsening of pain but after two weeks had more relexation and msucle spasms subsided. Having some return of spasmss. Thumb numnbess remians. Still improved from prior to surgery. No significant change on neuro exam. She will f/u wt Dr Espinoza for repeat trigger point injections. Follow-up in 3 months with a CT of her cervical spine to assess for fusion.     I spent more than 20 minutes in this apt, examining the pt, reviewing the scans, reviewing notes from chart, discussing treatment options with risks and benefits and coordinating care.     Savannah Mckeon MD      CC:     Amanda Lin  [address] [facility] [address]        Again, thank you for allowing me to participate in the care of your patient.        Sincerely,        Savannah Mckeon MD

## 2021-11-16 NOTE — PROGRESS NOTES
NEUROSURGERY FOLLOWUP  NOTE    Nora Jay comes today in f/u. Patient is a 45 yo female who is s/p cervical 5-6 anterior cervical decompression and fusion on 2/16/21.S/p trigger point injections on 9/10/21 with Dr Espinoza. Had some initial worsening of pain but after two weeks had more relexation and msucle spasms subsided. Having some return of spasmss. Thumb numnbess remians. Still improved from prior to surgery. No significant change on neuro exam. She will f/u wt Dr Espinoza for repeat trigger point injections. Follow-up in 3 months with a CT of her cervical spine to assess for fusion.     I spent more than 20 minutes in this apt, examining the pt, reviewing the scans, reviewing notes from chart, discussing treatment options with risks and benefits and coordinating care.     Savannah Mckeon MD      CC:     Amanda Lin  [address] [address]

## 2021-12-09 ENCOUNTER — ANCILLARY PROCEDURE (OUTPATIENT)
Dept: PHYSICAL MEDICINE AND REHAB | Facility: CLINIC | Age: 46
End: 2021-12-09
Attending: PAIN MEDICINE
Payer: COMMERCIAL

## 2021-12-09 VITALS
TEMPERATURE: 98.5 F | OXYGEN SATURATION: 98 % | DIASTOLIC BLOOD PRESSURE: 70 MMHG | SYSTOLIC BLOOD PRESSURE: 118 MMHG | HEART RATE: 83 BPM

## 2021-12-09 DIAGNOSIS — M79.18 MYOFASCIAL PAIN: ICD-10-CM

## 2021-12-09 DIAGNOSIS — M54.12 CERVICAL RADICULOPATHY: ICD-10-CM

## 2021-12-09 PROCEDURE — 76942 ECHO GUIDE FOR BIOPSY: CPT | Performed by: PAIN MEDICINE

## 2021-12-09 PROCEDURE — 20552 NJX 1/MLT TRIGGER POINT 1/2: CPT | Performed by: PAIN MEDICINE

## 2021-12-09 RX ORDER — LIDOCAINE HYDROCHLORIDE 10 MG/ML
INJECTION, SOLUTION EPIDURAL; INFILTRATION; INTRACAUDAL; PERINEURAL
Status: COMPLETED | OUTPATIENT
Start: 2021-12-09 | End: 2021-12-09

## 2021-12-09 RX ORDER — LIDOCAINE HYDROCHLORIDE 20 MG/ML
INJECTION, SOLUTION EPIDURAL; INFILTRATION; INTRACAUDAL; PERINEURAL
Status: COMPLETED | OUTPATIENT
Start: 2021-12-09 | End: 2021-12-09

## 2021-12-09 RX ORDER — METHYLPREDNISOLONE ACETATE 40 MG/ML
INJECTION, SUSPENSION INTRA-ARTICULAR; INTRALESIONAL; INTRAMUSCULAR; SOFT TISSUE
Status: COMPLETED | OUTPATIENT
Start: 2021-12-09 | End: 2021-12-09

## 2021-12-09 RX ORDER — BUPIVACAINE HYDROCHLORIDE 2.5 MG/ML
INJECTION, SOLUTION EPIDURAL; INFILTRATION; INTRACAUDAL
Status: COMPLETED | OUTPATIENT
Start: 2021-12-09 | End: 2021-12-09

## 2021-12-09 RX ADMIN — LIDOCAINE HYDROCHLORIDE 2 ML: 10 INJECTION, SOLUTION EPIDURAL; INFILTRATION; INTRACAUDAL; PERINEURAL at 16:02

## 2021-12-09 RX ADMIN — LIDOCAINE HYDROCHLORIDE 0.75 ML: 20 INJECTION, SOLUTION EPIDURAL; INFILTRATION; INTRACAUDAL; PERINEURAL at 16:02

## 2021-12-09 RX ADMIN — METHYLPREDNISOLONE ACETATE 10 MG: 40 INJECTION, SUSPENSION INTRA-ARTICULAR; INTRALESIONAL; INTRAMUSCULAR; SOFT TISSUE at 16:03

## 2021-12-09 RX ADMIN — BUPIVACAINE HYDROCHLORIDE 2 ML: 2.5 INJECTION, SOLUTION EPIDURAL; INFILTRATION; INTRACAUDAL at 16:02

## 2021-12-09 ASSESSMENT — PAIN SCALES - GENERAL
PAINLEVEL: SEVERE PAIN (6)
PAINLEVEL: SEVERE PAIN (6)

## 2021-12-09 NOTE — PATIENT INSTRUCTIONS
Follow-up visit as scheduled in 2 weeks with Dr. Espinoza to discuss injection outcome and determine care plan going forward.       DISCHARGE INSTRUCTIONS    During office hours (8:00 a.m.- 4:00 p.m.) questions or concerns may be answered  by calling Spine Center Navigation Nurses at  [phone].  Messages received after hours will be returned the following business day.      In the case of an emergency, please dial 911 or seek assistance at the nearest Emergency Room/Urgent Care facility.     All Patients:    ? You may experience an increase in your symptoms for the first 2 days (It may take anywhere between 2 days- 2 weeks for the steroid to have maximum effect).    ? You may use ice on the injection site, as frequently as 20 minutes each hour if needed.    ? You may take your pain medicine.    ? You may continue taking your regular medication after your injection.     ? You may shower. No swimming, tub bath or hot tub for 48 hours.  You may remove your bandaid/bandage as soon as you are home.    ? You may resume light activities, as tolerated.    ? Resume your usual diet as tolerated.      POSSIBLE STEROID SIDE EFFECTS (If steroid/cortisone was used for your procedure)    -If you experience these symptoms, it should only last for a short period      Swelling of the legs                Skin redness (flushing)       Mouth (oral) irritation     Blood sugar (glucose) levels              Sweats                      Mood changes    Headache    Sleeplessness    Weakened immune system for up to 14 days, which could increase the risk of sudhir the COVID-19 virus and/or experiencing more severe symptoms of the disease, if exposed.    Decreased effectiveness of the flu vaccine if given within 2 weeks of the steroid.         POSSIBLE PROCEDURE SIDE EFFECTS  -Call the Spine Center if you are concerned    Increased Pain             Increased numbness/tingling        Nausea/Vomiting            Bruising/bleeding at site         Redness or swelling                                                Difficulty walking        Weakness             Fever greater than 100.5    *In the event of a severe headache after an epidural steroid injection that is relieved by lying down, please call the [facility] to speak with a clinical staff member*

## 2021-12-09 NOTE — LETTER
12/9/2021         RE: Nora Jay  [address] [facility] [address]        Dear Colleague,    Thank you for referring your patient, Nora Jay, to the [facility]. Please see a copy of my visit note below.    Patient was here for an injection today.        Again, thank you for allowing me to participate in the care of your patient.        Sincerely,        Anibal Espinoza, DO

## 2021-12-09 NOTE — LETTER
December 9, 2021      Nora Jay  [address] [facility] [address]              To Whom It May Concern,    Nora Jay was seen in our clinic today 12/9/2021 for a medical procedure. Please excuse her from work today.           Sincerely,      Anibal Espinoza

## 2021-12-22 ENCOUNTER — OFFICE VISIT (OUTPATIENT)
Dept: PHYSICAL MEDICINE AND REHAB | Facility: CLINIC | Age: 46
End: 2021-12-22
Payer: COMMERCIAL

## 2021-12-22 VITALS — SYSTOLIC BLOOD PRESSURE: 140 MMHG | DIASTOLIC BLOOD PRESSURE: 81 MMHG | HEART RATE: 87 BPM | TEMPERATURE: 98.2 F

## 2021-12-22 DIAGNOSIS — M54.12 CERVICAL RADICULITIS: ICD-10-CM

## 2021-12-22 DIAGNOSIS — M54.12 CERVICAL RADICULOPATHY: ICD-10-CM

## 2021-12-22 DIAGNOSIS — M79.18 MYOFASCIAL PAIN: Primary | ICD-10-CM

## 2021-12-22 DIAGNOSIS — Z98.1 S/P CERVICAL SPINAL FUSION: ICD-10-CM

## 2021-12-22 PROCEDURE — 99214 OFFICE O/P EST MOD 30 MIN: CPT | Performed by: PAIN MEDICINE

## 2021-12-22 RX ORDER — CYCLOBENZAPRINE HCL 10 MG
10 TABLET ORAL
Qty: 30 TABLET | Refills: 3 | Status: SHIPPED | OUTPATIENT
Start: 2021-12-22 | End: 2023-04-19

## 2021-12-22 ASSESSMENT — PAIN SCALES - GENERAL: PAINLEVEL: MODERATE PAIN (5)

## 2021-12-22 NOTE — LETTER
12/22/2021         RE: Nora Jay  [address] [facility] [address]        Dear Colleague,    Thank you for referring your patient, Nora Jay, to the [facility]. Please see a copy of my visit note below.      Assessment:     Diagnoses and all orders for this visit:  Myofascial pain  -     cyclobenzaprine (FLEXERIL) 10 MG tablet; Take 1 tablet (10 mg) by mouth nightly as needed for muscle spasms  Cervical radiculopathy  S/P cervical spinal fusion  Cervical radiculitis     Nora Jay is a 46 year old y.o. female with past medical history significant for herpes simplex type I, migraine headache, ankle sprain, fibroids who presents today for follow-up regarding neck pain:     -Patient did receive benefit from trigger point injections into the trapezial muscles, however she has been very active and feels that this is can indicated benefits and feels that if she rests it will help more.    Plan:     A shared decision making plan was used.  The patient's values and choices were respected. Prior medical records from our last visit on 9/24/2021 were reviewed today. The following represents what was discussed and decided upon by the provider and the patient.     -DIAGNOSTIC TESTS: Images were personally reviewed and interpreted.   --MRI of the cervical spine dated 2/2/2021 is personally viewed images interpreted discussed with patient.  There is a moderate sized right paracentral disc protrusion that is more rightward.  Cystlike lesion in the left thyroid with thyroid ultrasound recommended for further evaluation.              -INTERVENTIONS: No interventions at this time.  Could consider repeat trapezial trigger point injections versus OMT.    -MEDICATIONS: I have switched her to cyclobenzaprine 10 mg at bedtime.  We will discontinue methocarbamol at this time.  -  Discussed side effects of medications and proper use. Patient verbalized understanding.    -PHYSICAL THERAPY:  She is just finishing up another course of physical therapy and will be doing home exercises after this.    Discussed the importance of core strengthening, ROM, stretching exercises with the patient and how each of these entities is important in decreasing pain.  Explained to the patient that the purpose of physical therapy is to teach the patient a home exercise program.  These exercises need to be performed every day in order to decrease pain and prevent future occurrences of pain.        -PATIENT EDUCATION: We discussed pain management in a multimodal fashion including physical therapy, medication management, possible future injections.    -FOLLOW UP: Patient will follow up as needed.  Advised to contact clinic if symptoms worsen or change.    Subjective:     Nora Jay is a 46 year old female who presents today for follow-up regarding neck pain.  Patient reports that she did receive benefit from trigger point injections, however she is also been very active recently and has not been able to rest much.  She notes that she feels that when she is able to rest the pain relief will be even better.  She does not feel as tense now though.  Her pain today is 5/10 at worst is 7/10 is best is 4/10.  She continues to have numbness in the C6 dermatome distribution.  Pain is worse with overuse and extra activity as well as lack of sleep.  Pain is improved.  Cyclobenzaprine versus methocarbamol and Tylenol.  She continues to do physical therapy and in fact will be completing current course of physical therapy soon and will be doing home exercises.  She denies any bowel or bladder changes, fevers, chills, unintentional weight loss.    No myelopathic symptoms.     Evaluation to Date: MRI of the cervical spine dated 2/2/2021.     Treatment to Date: Right C5-6 transforaminal epidural steroid injection done on 2/4/2021.  Anterior right C5-6 fusion on 2/16/2021 with Dr. Mckeon.  Right trapezial muscle trigger point injection  done on 9/10/2021.  Bilateral trapezial muscle trigger point injections done on 12/9/2021.       Patient Active Problem List   Diagnosis     Herpes Simplex Type I     Migraine Headache     Ankle Sprain     Fibroids, subserous     Cervical radiculopathy     Cervical stenosis of spine     Cervical stenosis of spinal canal       Current Outpatient Medications   Medication     acetaminophen (TYLENOL) 500 MG tablet     cyclobenzaprine (FLEXERIL) 10 MG tablet     valACYclovir (VALTREX) 500 MG tablet     valACYclovir (VALTREX) 500 MG tablet     No current facility-administered medications for this visit.       Allergies   Allergen Reactions     Venom-Honey Bee [Bee Venom] Anaphylaxis     Penicillins Hives     Iohexol Rash     Post 10/29/15 CT IV contrast injection pt. Noted rash on stomach at home.  Unknown to what caused this/thought possible IV contrast reaction.       Past Medical History:   Diagnosis Date     Acute neck pain     radiating down right arm     Fibroids         Review of Systems  ROS: Specifically negative for bowel/bladder dysfunction, balance changes, headache, dizziness, foot drop, fevers, chills, appetite changes, nausea/vomiting, unexplained weight loss. Otherwise 13 systems reviewed are negative. Please see the patient's intake questionnaire from today for details.    Reviewed Social, Family, Past Medical and Past Surgical history with patient, no significant changes noted since prior visit.     Objective:     BP (!) 140/81   Pulse 87   Temp 98.2  F (36.8  C)     PHYSICAL EXAMINATION:   --CONSTITUTIONAL: Vital signs as above. No acute distress. The patient is well nourished and well groomed.  --PSYCHIATRIC:  The patient is awake, alert, oriented to person, place, time and answering questions appropriately with clear speech. Appropriate mood and affect   --HEENT: Sclera are non-injected.   --SKIN: Skin over the face is clean, dry, intact without rashes.  --RESPIRATORY: Normal rhythm and effort. No  abnormal accessory muscle breathing patterns noted.   --GROSS MOTOR: Easily arises from a seated position.   --CERVICAL SPINE: Inspection reveals no evidence of deformity. Range of motion is mildly limited in cervical flexion, extension, lateral rotation.  Tenderness palpation across the cervical paraspinal muscles and trapezial muscles.   --SHOULDERS: Full range of motion bilaterally. Negative empty can.  --UPPER EXTREMITY MOTOR TESTING:  Wrist flexion left 5/5, right 5/5  Wrist extension left 5/5, right 5/5  Pronators left 5/5, right 5/5  Biceps left 5/5, right 5/5   Triceps left 5/5, right 5/5   Shoulder abduction left 5/5, right 5/5   left 5/5, right 5/5  --NEUROLOGIC: Decreased sensation to light touch in her right thumb.    Imaging of the cervical spine: Cervical spine imaging was reviewed today. The images were shown to the patient and the findings were explained using a spine model.    PAIN US Trigger Point Injection One to Two Muscles    Result Date: 12/9/2021  Procedure:  Ultrasound-guided bilateral trapezial muscle trigger point injection(s). Pre Procedure Diagnosis:  Myofascial pain Post Procedure Diagnosis:  Same Procedure Performed:  Bilateral trapezial muscle trigger point injections with Ultrasound Guidance Clinical Scenario:  As per office notes Vital Signs:  As per rooming/preprocedure documentation Side Injected: Bilateral After discussing the risks, benefits, and alternatives to the procedure, the patient expressed understanding and wished to proceed.  The patient was brought to the procedure suite and placed in the prone position.  A procedural pause was conducted to verify:  correct patient identity, procedure to be performed and as applicable, correct side and site, correct patient position, and availability of implants, special equipment or special requirements.  A simple surgical tray was used.  Prior to the procedure, the bilateral trapezial muscles was examined with a 12 MHz linear  transducer to visualize the bilateral trapezial muscles and determine the optimal needle path. Following this, the area was prepared with a ChloraPrep scrub, then re-examined using the same transducer, a sterile ultrasound transducer cover, and sterile ultrasound transducer gel.  Local anesthesia was obtained with 2 cc of 1% lidocaine. Thereafter, using ultrasound guidance, a 2 inch 25-gauge needle was advanced into the bilateral trapezial muscles. After visualization of the tip and negative aspiration for blood, a mixture of 10 mg of Depo-Medrol, 1.75 cc of 2% lidocaine and 2 cc of 0.25% bupivacaine were split equally and was injected into the bilateral trapezial muscles. Following the injection, the needle was withdrawn.  The patient tolerated the procedure well and there were no apparent complications.  After an appropriate amount of observation, the patient was dismissed from the clinic in good condition under their own power. Pre-Pain Score: 7/10 Post-Pain Score: 7/10 The patient will follow up with Dr. Espinoza in 2-4 weeks.                       Again, thank you for allowing me to participate in the care of your patient.        Sincerely,        Anibal Espinoza, DO

## 2021-12-22 NOTE — PATIENT INSTRUCTIONS
Recommend you continue with your home exercises from physical therapy.    I ordered cyclobenzaprine 10 mg that he can take at bedtime as needed.    ~Please call Nurse Navigation line [phone] with any questions or concerns about your treatment plan, if symptoms worsen and you would like to be seen urgently, or if you have problems controlling bladder and bowel function.

## 2021-12-22 NOTE — PROGRESS NOTES
Assessment:     Diagnoses and all orders for this visit:  Myofascial pain  -     cyclobenzaprine (FLEXERIL) 10 MG tablet; Take 1 tablet (10 mg) by mouth nightly as needed for muscle spasms  Cervical radiculopathy  S/P cervical spinal fusion  Cervical radiculitis     Nora Jay is a 46 year old y.o. female with past medical history significant for herpes simplex type I, migraine headache, ankle sprain, fibroids who presents today for follow-up regarding neck pain:     -Patient did receive benefit from trigger point injections into the trapezial muscles, however she has been very active and feels that this is can indicated benefits and feels that if she rests it will help more.    Plan:     A shared decision making plan was used.  The patient's values and choices were respected. Prior medical records from our last visit on 9/24/2021 were reviewed today. The following represents what was discussed and decided upon by the provider and the patient.     -DIAGNOSTIC TESTS: Images were personally reviewed and interpreted.   --MRI of the cervical spine dated 2/2/2021 is personally viewed images interpreted discussed with patient.  There is a moderate sized right paracentral disc protrusion that is more rightward.  Cystlike lesion in the left thyroid with thyroid ultrasound recommended for further evaluation.              -INTERVENTIONS: No interventions at this time.  Could consider repeat trapezial trigger point injections versus OMT.    -MEDICATIONS: I have switched her to cyclobenzaprine 10 mg at bedtime.  We will discontinue methocarbamol at this time.  -  Discussed side effects of medications and proper use. Patient verbalized understanding.    -PHYSICAL THERAPY: She is just finishing up another course of physical therapy and will be doing home exercises after this.    Discussed the importance of core strengthening, ROM, stretching exercises with the patient and how each of these entities is important in  decreasing pain.  Explained to the patient that the purpose of physical therapy is to teach the patient a home exercise program.  These exercises need to be performed every day in order to decrease pain and prevent future occurrences of pain.        -PATIENT EDUCATION: We discussed pain management in a multimodal fashion including physical therapy, medication management, possible future injections.    -FOLLOW UP: Patient will follow up as needed.  Advised to contact clinic if symptoms worsen or change.    Subjective:     Nora Jay is a 46 year old female who presents today for follow-up regarding neck pain.  Patient reports that she did receive benefit from trigger point injections, however she is also been very active recently and has not been able to rest much.  She notes that she feels that when she is able to rest the pain relief will be even better.  She does not feel as tense now though.  Her pain today is 5/10 at worst is 7/10 is best is 4/10.  She continues to have numbness in the C6 dermatome distribution.  Pain is worse with overuse and extra activity as well as lack of sleep.  Pain is improved.  Cyclobenzaprine versus methocarbamol and Tylenol.  She continues to do physical therapy and in fact will be completing current course of physical therapy soon and will be doing home exercises.  She denies any bowel or bladder changes, fevers, chills, unintentional weight loss.    No myelopathic symptoms.     Evaluation to Date: MRI of the cervical spine dated 2/2/2021.     Treatment to Date: Right C5-6 transforaminal epidural steroid injection done on 2/4/2021.  Anterior right C5-6 fusion on 2/16/2021 with Dr. Mckeon.  Right trapezial muscle trigger point injection done on 9/10/2021.  Bilateral trapezial muscle trigger point injections done on 12/9/2021.       Patient Active Problem List   Diagnosis     Herpes Simplex Type I     Migraine Headache     Ankle Sprain     Fibroids, subserous     Cervical  radiculopathy     Cervical stenosis of spine     Cervical stenosis of spinal canal       Current Outpatient Medications   Medication     acetaminophen (TYLENOL) 500 MG tablet     cyclobenzaprine (FLEXERIL) 10 MG tablet     valACYclovir (VALTREX) 500 MG tablet     valACYclovir (VALTREX) 500 MG tablet     No current facility-administered medications for this visit.       Allergies   Allergen Reactions     Venom-Honey Bee [Bee Venom] Anaphylaxis     Penicillins Hives     Iohexol Rash     Post 10/29/15 CT IV contrast injection pt. Noted rash on stomach at home.  Unknown to what caused this/thought possible IV contrast reaction.       Past Medical History:   Diagnosis Date     Acute neck pain     radiating down right arm     Fibroids         Review of Systems  ROS: Specifically negative for bowel/bladder dysfunction, balance changes, headache, dizziness, foot drop, fevers, chills, appetite changes, nausea/vomiting, unexplained weight loss. Otherwise 13 systems reviewed are negative. Please see the patient's intake questionnaire from today for details.    Reviewed Social, Family, Past Medical and Past Surgical history with patient, no significant changes noted since prior visit.     Objective:     BP (!) 140/81   Pulse 87   Temp 98.2  F (36.8  C)     PHYSICAL EXAMINATION:   --CONSTITUTIONAL: Vital signs as above. No acute distress. The patient is well nourished and well groomed.  --PSYCHIATRIC:  The patient is awake, alert, oriented to person, place, time and answering questions appropriately with clear speech. Appropriate mood and affect   --HEENT: Sclera are non-injected.   --SKIN: Skin over the face is clean, dry, intact without rashes.  --RESPIRATORY: Normal rhythm and effort. No abnormal accessory muscle breathing patterns noted.   --GROSS MOTOR: Easily arises from a seated position.   --CERVICAL SPINE: Inspection reveals no evidence of deformity. Range of motion is mildly limited in cervical flexion, extension,  lateral rotation.  Tenderness palpation across the cervical paraspinal muscles and trapezial muscles.   --SHOULDERS: Full range of motion bilaterally. Negative empty can.  --UPPER EXTREMITY MOTOR TESTING:  Wrist flexion left 5/5, right 5/5  Wrist extension left 5/5, right 5/5  Pronators left 5/5, right 5/5  Biceps left 5/5, right 5/5   Triceps left 5/5, right 5/5   Shoulder abduction left 5/5, right 5/5   left 5/5, right 5/5  --NEUROLOGIC: Decreased sensation to light touch in her right thumb.    Imaging of the cervical spine: Cervical spine imaging was reviewed today. The images were shown to the patient and the findings were explained using a spine model.    PAIN US Trigger Point Injection One to Two Muscles    Result Date: 12/9/2021  Procedure:  Ultrasound-guided bilateral trapezial muscle trigger point injection(s). Pre Procedure Diagnosis:  Myofascial pain Post Procedure Diagnosis:  Same Procedure Performed:  Bilateral trapezial muscle trigger point injections with Ultrasound Guidance Clinical Scenario:  As per office notes Vital Signs:  As per rooming/preprocedure documentation Side Injected: Bilateral After discussing the risks, benefits, and alternatives to the procedure, the patient expressed understanding and wished to proceed.  The patient was brought to the procedure suite and placed in the prone position.  A procedural pause was conducted to verify:  correct patient identity, procedure to be performed and as applicable, correct side and site, correct patient position, and availability of implants, special equipment or special requirements.  A simple surgical tray was used.  Prior to the procedure, the bilateral trapezial muscles was examined with a 12 MHz linear transducer to visualize the bilateral trapezial muscles and determine the optimal needle path. Following this, the area was prepared with a ChloraPrep scrub, then re-examined using the same transducer, a sterile ultrasound transducer cover,  and sterile ultrasound transducer gel.  Local anesthesia was obtained with 2 cc of 1% lidocaine. Thereafter, using ultrasound guidance, a 2 inch 25-gauge needle was advanced into the bilateral trapezial muscles. After visualization of the tip and negative aspiration for blood, a mixture of 10 mg of Depo-Medrol, 1.75 cc of 2% lidocaine and 2 cc of 0.25% bupivacaine were split equally and was injected into the bilateral trapezial muscles. Following the injection, the needle was withdrawn.  The patient tolerated the procedure well and there were no apparent complications.  After an appropriate amount of observation, the patient was dismissed from the clinic in good condition under their own power. Pre-Pain Score: 7/10 Post-Pain Score: 7/10 The patient will follow up with Dr. Espinoza in 2-4 weeks.

## 2021-12-23 ENCOUNTER — MEDICAL CORRESPONDENCE (OUTPATIENT)
Dept: NEUROSURGERY | Facility: CLINIC | Age: 46
End: 2021-12-23
Payer: COMMERCIAL

## 2022-01-14 ENCOUNTER — MEDICAL CORRESPONDENCE (OUTPATIENT)
Dept: NEUROSURGERY | Facility: CLINIC | Age: 47
End: 2022-01-14
Payer: COMMERCIAL

## 2022-02-04 DIAGNOSIS — Z86.19 HISTORY OF COLD SORES: ICD-10-CM

## 2022-02-04 RX ORDER — VALACYCLOVIR HYDROCHLORIDE 500 MG/1
TABLET, FILM COATED ORAL
Qty: 60 TABLET | Refills: 3 | Status: SHIPPED | OUTPATIENT
Start: 2022-02-04 | End: 2022-11-11

## 2022-02-22 ENCOUNTER — HOSPITAL ENCOUNTER (OUTPATIENT)
Dept: CT IMAGING | Facility: CLINIC | Age: 47
Discharge: HOME | End: 2022-02-22
Attending: SURGERY | Admitting: SURGERY
Payer: COMMERCIAL

## 2022-02-22 ENCOUNTER — OFFICE VISIT (OUTPATIENT)
Dept: NEUROSURGERY | Facility: CLINIC | Age: 47
End: 2022-02-22
Payer: COMMERCIAL

## 2022-02-22 VITALS
SYSTOLIC BLOOD PRESSURE: 115 MMHG | DIASTOLIC BLOOD PRESSURE: 75 MMHG | OXYGEN SATURATION: 99 % | BODY MASS INDEX: 29.41 KG/M2 | HEIGHT: 63 IN | HEART RATE: 90 BPM | WEIGHT: 166 LBS

## 2022-02-22 DIAGNOSIS — Z98.1 S/P CERVICAL SPINAL FUSION: Primary | ICD-10-CM

## 2022-02-22 DIAGNOSIS — Z98.1 S/P CERVICAL SPINAL FUSION: ICD-10-CM

## 2022-02-22 PROCEDURE — 72125 CT NECK SPINE W/O DYE: CPT

## 2022-02-22 PROCEDURE — 99024 POSTOP FOLLOW-UP VISIT: CPT | Performed by: NURSE PRACTITIONER

## 2022-02-22 NOTE — PROGRESS NOTES
"Neurosurgery Follow UP  February 22, 2022    A/P: Leila Jay is a 46 year old s/p cervical 5-6 anterior cervical decompression and fusion on 2/16/21. Images today show signs of fusion.     Leila states she is much better than pre-op. She continues to have dull achy constant discomfort posterior neck, shoulders. She has no feeling in her right thumb and it sometimes feels it runs up a specific distribution in her right arm. Has been getting trigger point injections with Dr Alonzo - not as much relief after second round. Rest also gives her relief. She has a very busy life, working two jobs. Massage has helped in the past. She has not had any recently. She takes muscle relaxer and tyelnol in order to get any sleep at night due to her discomfort. If you press a certain spot on her back, she gets zingers down her back.     Plan: return as needed. Continue at the spine center as needed. Attempt to incorporate more rest. Daily exercise, stretch. Massage.     HPI: Presented with right arm pain, numbness and tingling. MRI with right paracentral C5-6 disc extrusion with ventral cord flattening and moderate spinal canal stenosis and compression of C6 nerve. XR with mild reversal of lordotic curve near C5-6. No relief with medications or injections. Reacts poorly to gabapentin.     Physical Exam  /75   Pulse 90   Ht 5' 3\" (1.6 m)   Wt 166 lb (75.3 kg)   SpO2 99%   BMI 29.41 kg/m      General: alert, oriented. MENDOZA. Speech clear.     Motor: normal bulk and tone     Strength:   biceps 5/5 right, 5/5 left   Wrist extensors 5/5 right, 5/5 left   Triceps 5/5 right, 5/5 left   Deltoid 5/5 right, 5/5 left  Hand grasp 5/5 right, 5/5 left     Coordination: steady gait     Imaging: reviewed personally, compared to prior's. Shared with patient and Dr Mckeon.     Caty Chavarria, -[facility]  O: [phone]        "

## 2022-02-22 NOTE — LETTER
"    2/22/2022         RE: Nora Jay  [address] [facility] [address]        Dear Colleague,    Thank you for referring your patient, Nora Jay, to the [facility]. Please see a copy of my visit note below.    Neurosurgery Follow UP  February 22, 2022    A/P: Leila Jay is a 46 year old s/p cervical 5-6 anterior cervical decompression and fusion on 2/16/21. Images today show signs of fusion.     Leila states she is much better than pre-op. She continues to have dull achy constant discomfort posterior neck, shoulders. She has no feeling in her right thumb and it sometimes feels it runs up a specific distribution in her right arm. Has been getting trigger point injections with Dr Alonzo - not as much relief after second round. Rest also gives her relief. She has a very busy life, working two jobs. Massage has helped in the past. She has not had any recently. She takes muscle relaxer and tyelnol in order to get any sleep at night due to her discomfort. If you press a certain spot on her back, she gets zingers down her back.     Plan: return as needed. Continue at the spine center as needed. Attempt to incorporate more rest. Daily exercise, stretch. Massage.     HPI: Presented with right arm pain, numbness and tingling. MRI with right paracentral C5-6 disc extrusion with ventral cord flattening and moderate spinal canal stenosis and compression of C6 nerve. XR with mild reversal of lordotic curve near C5-6. No relief with medications or injections. Reacts poorly to gabapentin.     Physical Exam  /75   Pulse 90   Ht 5' 3\" (1.6 m)   Wt 166 lb (75.3 kg)   SpO2 99%   BMI 29.41 kg/m      General: alert, oriented. MENDOZA. Speech clear.     Motor: normal bulk and tone     Strength:   biceps 5/5 right, 5/5 left   Wrist extensors 5/5 right, 5/5 left   Triceps 5/5 right, 5/5 left   Deltoid 5/5 right, 5/5 left  Hand grasp 5/5 right, 5/5 left     Coordination: steady gait "     Imaging: reviewed personally, compared to prior's. Shared with patient and Dr Mckeon.     MICHELLE Myles-CNP  [facility]  O: [phone]            Again, thank you for allowing me to participate in the care of your patient.        Sincerely,        PAOLA Forman CNP

## 2022-04-10 ENCOUNTER — HEALTH MAINTENANCE LETTER (OUTPATIENT)
Age: 47
End: 2022-04-10

## 2022-05-16 ENCOUNTER — MYC MEDICAL ADVICE (OUTPATIENT)
Dept: FAMILY MEDICINE | Facility: CLINIC | Age: 47
End: 2022-05-16
Payer: COMMERCIAL

## 2022-05-17 ENCOUNTER — OFFICE VISIT (OUTPATIENT)
Dept: FAMILY MEDICINE | Facility: CLINIC | Age: 47
End: 2022-05-17
Payer: COMMERCIAL

## 2022-05-17 ENCOUNTER — TELEPHONE (OUTPATIENT)
Dept: FAMILY MEDICINE | Facility: CLINIC | Age: 47
End: 2022-05-17

## 2022-05-17 VITALS
HEART RATE: 92 BPM | SYSTOLIC BLOOD PRESSURE: 130 MMHG | WEIGHT: 167 LBS | BODY MASS INDEX: 29.58 KG/M2 | DIASTOLIC BLOOD PRESSURE: 76 MMHG

## 2022-05-17 DIAGNOSIS — R10.13 ABDOMINAL PAIN, EPIGASTRIC: ICD-10-CM

## 2022-05-17 DIAGNOSIS — F90.0 ATTENTION DEFICIT HYPERACTIVITY DISORDER (ADHD), PREDOMINANTLY INATTENTIVE TYPE: Primary | ICD-10-CM

## 2022-05-17 DIAGNOSIS — M79.672 LEFT FOOT PAIN: ICD-10-CM

## 2022-05-17 DIAGNOSIS — Z86.39 HISTORY OF THYROID NODULE: ICD-10-CM

## 2022-05-17 LAB
ALBUMIN SERPL-MCNC: 4 G/DL (ref 3.5–5)
ALP SERPL-CCNC: 82 U/L (ref 45–120)
ALT SERPL W P-5'-P-CCNC: 12 U/L (ref 0–45)
ANION GAP SERPL CALCULATED.3IONS-SCNC: 12 MMOL/L (ref 5–18)
AST SERPL W P-5'-P-CCNC: 14 U/L (ref 0–40)
BILIRUB SERPL-MCNC: 0.2 MG/DL (ref 0–1)
BUN SERPL-MCNC: 11 MG/DL (ref 8–22)
CALCIUM SERPL-MCNC: 9.6 MG/DL (ref 8.5–10.5)
CHLORIDE BLD-SCNC: 103 MMOL/L (ref 98–107)
CO2 SERPL-SCNC: 25 MMOL/L (ref 22–31)
CREAT SERPL-MCNC: 0.87 MG/DL (ref 0.6–1.1)
ERYTHROCYTE [DISTWIDTH] IN BLOOD BY AUTOMATED COUNT: 12 % (ref 10–15)
GFR SERPL CREATININE-BSD FRML MDRD: 82 ML/MIN/1.73M2
GLUCOSE BLD-MCNC: 91 MG/DL (ref 70–125)
HCT VFR BLD AUTO: 39.2 % (ref 35–47)
HGB BLD-MCNC: 13.3 G/DL (ref 11.7–15.7)
IGA SERPL-MCNC: 91 MG/DL (ref 65–400)
LIPASE SERPL-CCNC: 31 U/L (ref 0–52)
MAGNESIUM SERPL-MCNC: 2.2 MG/DL (ref 1.8–2.6)
MCH RBC QN AUTO: 29.8 PG (ref 26.5–33)
MCHC RBC AUTO-ENTMCNC: 33.9 G/DL (ref 31.5–36.5)
MCV RBC AUTO: 88 FL (ref 78–100)
PLATELET # BLD AUTO: 349 10E3/UL (ref 150–450)
POTASSIUM BLD-SCNC: 3.7 MMOL/L (ref 3.5–5)
PROT SERPL-MCNC: 6.7 G/DL (ref 6–8)
RBC # BLD AUTO: 4.46 10E6/UL (ref 3.8–5.2)
SODIUM SERPL-SCNC: 140 MMOL/L (ref 136–145)
TSH SERPL DL<=0.005 MIU/L-ACNC: 0.46 UIU/ML (ref 0.3–5)
WBC # BLD AUTO: 9.1 10E3/UL (ref 4–11)

## 2022-05-17 PROCEDURE — 80053 COMPREHEN METABOLIC PANEL: CPT | Performed by: FAMILY MEDICINE

## 2022-05-17 PROCEDURE — 86364 TISS TRNSGLTMNASE EA IG CLAS: CPT | Performed by: FAMILY MEDICINE

## 2022-05-17 PROCEDURE — 85027 COMPLETE CBC AUTOMATED: CPT | Performed by: FAMILY MEDICINE

## 2022-05-17 PROCEDURE — 99215 OFFICE O/P EST HI 40 MIN: CPT | Performed by: FAMILY MEDICINE

## 2022-05-17 PROCEDURE — 36415 COLL VENOUS BLD VENIPUNCTURE: CPT | Performed by: FAMILY MEDICINE

## 2022-05-17 PROCEDURE — 82784 ASSAY IGA/IGD/IGG/IGM EACH: CPT | Performed by: FAMILY MEDICINE

## 2022-05-17 PROCEDURE — 83690 ASSAY OF LIPASE: CPT | Performed by: FAMILY MEDICINE

## 2022-05-17 PROCEDURE — 84443 ASSAY THYROID STIM HORMONE: CPT | Performed by: FAMILY MEDICINE

## 2022-05-17 PROCEDURE — 83735 ASSAY OF MAGNESIUM: CPT | Performed by: FAMILY MEDICINE

## 2022-05-17 RX ORDER — DEXTROAMPHETAMINE SACCHARATE, AMPHETAMINE ASPARTATE MONOHYDRATE, DEXTROAMPHETAMINE SULFATE AND AMPHETAMINE SULFATE 2.5; 2.5; 2.5; 2.5 MG/1; MG/1; MG/1; MG/1
10 CAPSULE, EXTENDED RELEASE ORAL DAILY
Qty: 30 CAPSULE | Refills: 0 | Status: SHIPPED | OUTPATIENT
Start: 2022-07-18 | End: 2022-08-15

## 2022-05-17 RX ORDER — DEXTROAMPHETAMINE SACCHARATE, AMPHETAMINE ASPARTATE MONOHYDRATE, DEXTROAMPHETAMINE SULFATE AND AMPHETAMINE SULFATE 2.5; 2.5; 2.5; 2.5 MG/1; MG/1; MG/1; MG/1
10 CAPSULE, EXTENDED RELEASE ORAL DAILY
Qty: 30 CAPSULE | Refills: 0 | Status: SHIPPED | OUTPATIENT
Start: 2022-05-17 | End: 2022-06-16

## 2022-05-17 RX ORDER — DEXTROAMPHETAMINE SACCHARATE, AMPHETAMINE ASPARTATE MONOHYDRATE, DEXTROAMPHETAMINE SULFATE AND AMPHETAMINE SULFATE 2.5; 2.5; 2.5; 2.5 MG/1; MG/1; MG/1; MG/1
10 CAPSULE, EXTENDED RELEASE ORAL DAILY
Qty: 30 CAPSULE | Refills: 0 | Status: SHIPPED | OUTPATIENT
Start: 2022-06-17 | End: 2022-07-17

## 2022-05-17 ASSESSMENT — PATIENT HEALTH QUESTIONNAIRE - PHQ9
SUM OF ALL RESPONSES TO PHQ QUESTIONS 1-9: 22
10. IF YOU CHECKED OFF ANY PROBLEMS, HOW DIFFICULT HAVE THESE PROBLEMS MADE IT FOR YOU TO DO YOUR WORK, TAKE CARE OF THINGS AT HOME, OR GET ALONG WITH OTHER PEOPLE: EXTREMELY DIFFICULT
SUM OF ALL RESPONSES TO PHQ QUESTIONS 1-9: 22

## 2022-05-17 NOTE — TELEPHONE ENCOUNTER
Yes, please submit to PA team - has tolerated this low dose well in the past and wishes to do so again

## 2022-05-17 NOTE — PATIENT INSTRUCTIONS
The Stimulant medications as you may recall include the following risks:  anorexia, poor growth or weight loss, sleep changes, jitteriness, emotional lability as well as the rare risks of sudden cardiac death, stroke, MI, priapsm, tics or increased suicidality (black box warning).

## 2022-05-17 NOTE — PROGRESS NOTES
Assessment/plan   Nora Jay is a 47 year old female who is established to my practice.    Nora was seen today for mental health problem, foot swelling and recheck.    Diagnoses and all orders for this visit:    Attention deficit hyperactivity disorder (ADHD), predominantly inattentive type  Diagnosed in childhood with ADHD inattentive type.  These records are not available.  Has been on Adderall 10mg in the past and tolerated well. She is open to potentially having a reassessment down the road in her adulthood though it sounds like she has a very strong family history of this and given the acute need for improving her focus with her recent job change and ability to survive this transition she would like to consider stimulant medication at today's visit which I feel comfortable doing. Risks and benefits discussed regarding options for medical management of ADHD. Side effect profile of first line stimulants reviewed per AVS, including more common side effects of anorexia, poor growth or weight loss, sleep changes, jitteriness, emotional lability as well as the rare risks of sudden cardiac death, stroke, MI, priapsm, tics or increased suicidality (black box warning). Benefits of improved focus, concentration and behaviors in the home and at work which have long term positive implications also discussed.   - Based on this conversation, we have agreed to start Adderall 10mg ER.  - Controlled substance agreement has not yet been signed, allow for 3 months before office visits with refills once at a steady dose. Anticipate this may only be a 6-12 month need as she transitions in her new job; but if not will plan to establish CSA  - Follow up in 3 months, mychart update if dose may need slight adjustment    She does have a high PHQ9 score but denies active or passive SI.     Reviewed for either of these medications the following black box warning and generally less risk over the age of 25.   ALERT: US Boxed Warning    Suicidality and antidepressant drugs:  Antidepressants increased the risk of suicidal thoughts and behavior in children, adolescents, and young adults in short-term trials. These trials did not show an increase in the risk of suicidal thoughts and behavior with antidepressant use in subjects over age 24; there was a reduction in risk with antidepressant use in subjects aged 65 and older.  In patients of all ages who are started on antidepressant therapy, monitor closely for worsening and for emergence of suicidal thoughts and behaviors. Advise families and caregivers of the need for close observation and communication with the prescriber. Bupropion is not approved for use in pediatric patients.}    -     amphetamine-dextroamphetamine (ADDERALL XR) 10 MG 24 hr capsule; Take 1 capsule (10 mg) by mouth daily  -     amphetamine-dextroamphetamine (ADDERALL XR) 10 MG 24 hr capsule; Take 1 capsule (10 mg) by mouth daily  -     amphetamine-dextroamphetamine (ADDERALL XR) 10 MG 24 hr capsule; Take 1 capsule (10 mg) by mouth daily    Left foot pain  Chronic ongoing issues; has seen ortho in the past and wishes to meet with podiatry next  -     Orthopedic  Referral; Future    Abdominal pain, epigastric  Famhx of stomach cancer in her aunt; pt with intermittent abdominal sx and hx of slower bowels (BMs every 2 days but denies feeling constipated).  At this point I recommended we start with some basic labs and we can consider further evaluation with GI consultation regarding imaging for EGD or CT imaging if symptoms worsen or do not improve.  She does additionally attribute many of her symptoms right now to the stress and anxiety of everything going on and so she would like to give this some more time to see if things settle down after her transition to the job.  -     Magnesium; Future  -     Comprehensive metabolic panel (BMP + Alb, Alk Phos, ALT, AST, Total. Bili, TP); Future  -     CBC with platelets; Future  -      Lipase; Future  -     IgA [LAB73]; Future  -     Tissue transglutaminase gabe IgA and IgG [[id number]]; Future    History of thyroid nodule: bx benign colloid follicle 3/2021  Reviewed with patient her follicle was benign on biopsy testing and at this point okay to observe clinically for any changes.  Ok for TSH today per pt request  -     TSH with free T4 reflex; Future        Follow up: Return in about 3 months (around 8/17/2022) for Recheck.    57 minutes spent on the date of the encounter doing chart review, patient visit and documentation.    Amanda Lin MD    Subjective:      HPI: Nora Jay is a 47 year old female who is here for:    Chief Complaint   Patient presents with     Mental Health Problem     Struggling with a new job, struggling with ADHD (or possibly ADHD Sx) - has been treated with Adderall in the past but not since the 90's      Foot Swelling     Hx of injury and surgery, painful starting in foot and shooting up leg into hip causing limping, tore MCL, requesting referral to podiatry, [facility] did not help      RECHECK     Thyroid growth follow up         Answers for HPI/ROS submitted by the patient on 5/17/2022  If you checked off any problems, how difficult have these problems made it for you to do your work, take care of things at home, or get along with other people?: Extremely difficult  PHQ9 TOTAL SCORE: 22  How many servings of fruits and vegetables do you eat daily?: 2-3  On average, how many sweetened beverages do you drink each day (Examples: soda, juice, sweet tea, etc.  Do NOT count diet or artificially sweetened beverages)?: 1  How many minutes a day do you exercise enough to make your heart beat faster?: 20 to 29  How many days a week do you exercise enough to make your heart beat faster?: 4  How many days per week do you miss taking your medication?: 3  What makes it hard for you to take your medication every day?: side effects  What is the reason for your visit today?:  "Adult ADHD medication. Stomach issues.  Podiatrist referral. Left Thyroid growth plan. Right arm/hand cramp symptoms and waking to migraines following Neurosurgery Feb 2021.  When did your symptoms begin?: 3-4 weeks ago  What are your symptoms?: Multiple  How would you describe these symptoms?: Moderate  Are your symptoms:: Worsening  Have you had these symptoms before?: Yes  Have you tried or received treatment for these symptoms before?: Yes  Did that treatment work? : Yes  Please describe the treatment you had:: Adderall medication  Is there anything that makes you feel worse?: Little rest      Concerns with ADHD worsening: She sent a natue message the other day with recent escalation in concern for ADHD kicking in \"full force \".  She reports having been prescribed Adderall years ago for a dx of ADHD and it helped her through some challenging years between [id number].  She is currently going through a job change and feels she needs to \"survive the new job and everything is floating around in her head and it is hard to focus \".     She really prefers to avoid meds but at this point feels she needs. Help.  Her new job is in  (different from the respiratory field); a lot of new information she is getting like a firehose \"coming at me\". She has tried all her organizing structures but it's not sufficient    At home family keeps asking her if she is ok.  She is more distant; emotionless. For example went to [employer] concert with her 25yo daughter and the daughter noticed she wasn't \"herself\" and didn't have fun with this    She denies any suicidal thoughts or ideations; \"I would never allow that\"    She isn't sleeping great right now because her mind and msk issues going on.    Her father side of the family suffers from ADHD as adults- her dad is in fact even on meds still in his 60s. About 8 of the 10 folks in her extended family are on Adderall.  She feels her children also are suffering from " ADHD.     Review of Systems:  She is concerned about some stomach issues as well. Occasional sharp pains in the abdomen but comes and goes; originally thought it was related to constipation so trying all the stuff for that.  Her aunt  from stomach cancer in her 40s so she is certainly concerned about this.      She wants a referral for podiatry to see her left ankle- 2019 ankle sprain; no fractures; went to [facility], did their PT and had an injection which didn't help. Still bothering her and causing referred knee and hip problems. Feels swollen and tender 100% of the time.    She is having some worsening of her right arm and hand muscle spasms.  She is hoping to go back to the spine clinic for a follow-up on that.  She indicates that if she holds a cup in her hand with a firm  her forearm and thumb will cramp up.  This happens occasionally with her bicep through her forearm as well cramping up painfully.    She had a question about her thyroid bx from 3/2021 which showed benign colloid follicle. Clinical follow up advised.   12 point comprehensive review of systems was negative except as noted and HPI     Social History:  Social History     Social History Narrative    Lives with her dog. She is a single parent.   Daughter Khadra 20yo and her 17yo son just is off to college.   Works now in cyber security.   Also coaches gymnastics in the evenings.   Walks 5lb [employer] dog for  exercise. Otherwise walks or runs 3-5 miles a couple times per week. Former smoker. No alcohol.   Amanda Lin MD         Medical History:  Patient Active Problem List   Diagnosis     Herpes Simplex Type I     Migraine Headache     Ankle Sprain     Fibroids, subserous     Cervical radiculopathy     Cervical stenosis of spine     Cervical stenosis of spinal canal     History of thyroid nodule     Past Medical History:   Diagnosis Date     Acute neck pain     radiating down right arm     Fibroids      Past Surgical History:   Procedure  Laterality Date     ANKLE SURGERY Left     ligaments     HYSTERECTOMY       MN ARTHRODESIS ANT INTERBODY INC DISCECTOMY, CERVICAL BELOW C2 N/A 2/15/2021    Procedure: CERVICAL 5-CERVICAL 6 ANTERIOR CERVICAL DECOMPRESSION AND FUSION WITH PLATE;  Surgeon: Savannah Mckeon MD;  Location: [facility];  Service: Spine     US BIOPSY THYROID FINE NEEDLE ASPIRATION  3/25/2021     [facility]  DELIVERY ONLY      Description:  Section;  Recorded: 2008;     [facility] LAP,VAG HYST,UTERUS 250GMS/<,SALP-OOPH N/A 10/27/2015    Procedure: LAPAROSCOPIC ASSISTED VAGINAL HYSTERECTOMY BILATERAL SALPINGECTOMY;  Surgeon: Av Lara MD;  Location: [facility];  Service: Gynecology     Venom-honey bee [bee venom], Penicillins, and Iohexol  Family History   Problem Relation Age of Onset     Hypertension Father      Diabetes Father      Colon Cancer Maternal Grandmother        Medications:  Current Outpatient Medications   Medication     acetaminophen (TYLENOL) 500 MG tablet     amphetamine-dextroamphetamine (ADDERALL XR) 10 MG 24 hr capsule     [START ON 2022] amphetamine-dextroamphetamine (ADDERALL XR) 10 MG 24 hr capsule     [START ON 2022] amphetamine-dextroamphetamine (ADDERALL XR) 10 MG 24 hr capsule     cyclobenzaprine (FLEXERIL) 10 MG tablet     valACYclovir (VALTREX) 500 MG tablet     valACYclovir (VALTREX) 500 MG tablet     No current facility-administered medications for this visit.         Imaging & Labs reviewed this visit:       EXAM: US THYROID  LOCATION: [facility]  DATE/TIME: 3/3/2021 1:16 PM     INDICATION: Follow up incidental MRI finding of cystlike lesion within the left thyroid lobe.  COMPARISON: None.  TECHNIQUE: Thyroid ultrasound.      FINDINGS:     RIGHT lobe: 5.5 x 1.4 x 1.4 cm. Homogeneous echotexture.     Isthmus: 2 mm.     LEFT lobe: 5 x 1.8 x 1.7 cm. Homogeneous echotexture.     NECK: No cervical lymphadenopathy.     NODULES:     Nodule 1:  Mid-lower left thyroid nodule measuring 2.6 x 1.7 x 1.8 cm.   Composition: Solid or almost completely solid, 2 points   Echogenicity: Unable to determine, 1 point   Shape: Wider-than-tall, 0 points   Margin: Ill-defined, 0 points   Echogenic Foci: None, or large comet-tail artifacts, 0 points   Point Total: 3 points. TI-RADS 3. If 2.5 cm or larger, recommend FNA; if 1.5 cm or larger, recommend follow up US at 1, 3, and 5 years.     IMPRESSION:     1.  Mid-lower left thyroid nodule meeting criteria for FNA. Recommend biopsy.     2.  No other nodules.        Nodules are characterized per  ACR Thyroid Imaging, Reporting and Data System (TI-RADS): White Paper of the ACR TI-RADS Committee  Rafael Mcdaniels et al. Journal of the American College of Radiology 2017. Volume 14 (2017), Issue 5, 539-333.        Objective:      Vitals:    05/17/22 1545   BP: 130/76   Pulse: 92   Weight: 75.8 kg (167 lb)       Physical Exam:     General: Alert, no acute distress. Anxious  HEENT: normocephalic conjunctivae are clear, Normal pearly TMs bilaterally without erythema, pus or fluid. Position and landmarks are normal.  Nose is clear.  Oropharynx is moist and clear, without tonsillar hypertrophy, asymmetry, exudate or lesions.   Neck: supple without adenopathy or thyromegaly.  Lungs: Good aeration bilaterally. Clear to auscultation without wheezes, rales or rhonci.   Heart: regular rate and rhythm, normal S1 and S2, no murmurs  Abdomen: soft and nontender  Skin: clear without rash or lesions  Neuro: alert, interactive moving all extremities equally, normal muscle tone in all 4 extremities    Amanda Lin MD

## 2022-05-17 NOTE — TELEPHONE ENCOUNTER
Please let pt know we can keep her in mind if we have a cancellation but right now Monday or Tuesday are my earliest appts; she also has the option to see another provider sooner if desired

## 2022-05-17 NOTE — TELEPHONE ENCOUNTER
Patient scheduled at 340 with Dr. Lin to arrive at 3:20. She will call back if she is unable to get out of her meeting at 3:30.   Michelle Peterson LPN

## 2022-05-17 NOTE — TELEPHONE ENCOUNTER
Reason for Call:  Other prescription    Detailed comments: Pt needs a Prior Auth for rx of the Aderrall that was sent to her pharm today.     Phone Number Patient can be reached at: Home number on file [phone] (home)    Best Time: anytime    Can we leave a detailed message on this number? YES    Call taken on 5/17/2022 at 4:36 PM by Anna Law

## 2022-05-18 ASSESSMENT — PATIENT HEALTH QUESTIONNAIRE - PHQ9: SUM OF ALL RESPONSES TO PHQ QUESTIONS 1-9: 22

## 2022-05-19 LAB
TTG IGA SER-ACNC: <0.2 U/ML
TTG IGG SER-ACNC: <0.6 U/ML

## 2022-05-20 NOTE — TELEPHONE ENCOUNTER
Central Prior Authorization Team   Phone: [phone]      PA Initiation    Medication: amphetamine-dextroamphetamine (ADDERALL XR) 10 MG 24 hr capsule - INITIATED  Insurance Company: [email or web address] Phone [phone] Fax [phone]  Pharmacy Filling the Rx: [facility] [address] [place] [address] [facility]  Filling Pharmacy Phone: [phone]  Filling Pharmacy Fax: [phone]  Start Date: 5/20/2022

## 2022-05-20 NOTE — TELEPHONE ENCOUNTER
Central Prior Authorization Team   Phone: [phone]      Prior Authorization Approval    Authorization Effective Date: 5/20/2022  Authorization Expiration Date: 5/20/2023  Medication: amphetamine-dextroamphetamine (ADDERALL XR) 10 MG 24 hr capsule - APPROVED  Approved Dose/Quantity: 30 FOR 30  Reference #:     Insurance Company: [employer] - Phone [phone] Fax [phone]  Expected CoPay:       CoPay Card Available:      Foundation Assistance Needed:    Which Pharmacy is filling the prescription (Not needed for infusion/clinic administered[facility] [address] [place] [address] [address] [place] [address]  Pharmacy Notified: Yes  Patient Notified: Yes (**Instructed pharmacy to notify patient when script is ready to /ship.**)

## 2022-05-27 ENCOUNTER — ANCILLARY PROCEDURE (OUTPATIENT)
Dept: MAMMOGRAPHY | Facility: CLINIC | Age: 47
End: 2022-05-27
Attending: FAMILY MEDICINE
Payer: COMMERCIAL

## 2022-05-27 DIAGNOSIS — Z12.31 VISIT FOR SCREENING MAMMOGRAM: ICD-10-CM

## 2022-05-27 PROCEDURE — 77067 SCR MAMMO BI INCL CAD: CPT

## 2022-05-31 ENCOUNTER — MYC MEDICAL ADVICE (OUTPATIENT)
Dept: FAMILY MEDICINE | Facility: CLINIC | Age: 47
End: 2022-05-31
Payer: COMMERCIAL

## 2022-05-31 DIAGNOSIS — F90.0 ATTENTION DEFICIT HYPERACTIVITY DISORDER (ADHD), PREDOMINANTLY INATTENTIVE TYPE: Primary | ICD-10-CM

## 2022-05-31 RX ORDER — DEXTROAMPHETAMINE SACCHARATE, AMPHETAMINE ASPARTATE, DEXTROAMPHETAMINE SULFATE AND AMPHETAMINE SULFATE 5; 5; 5; 5 MG/1; MG/1; MG/1; MG/1
20 TABLET ORAL DAILY
Qty: 30 TABLET | Refills: 0 | Status: SHIPPED | OUTPATIENT
Start: 2022-05-31 | End: 2022-06-30

## 2022-06-30 ENCOUNTER — MYC REFILL (OUTPATIENT)
Dept: FAMILY MEDICINE | Facility: CLINIC | Age: 47
End: 2022-06-30

## 2022-06-30 DIAGNOSIS — F90.0 ATTENTION DEFICIT HYPERACTIVITY DISORDER (ADHD), PREDOMINANTLY INATTENTIVE TYPE: ICD-10-CM

## 2022-07-01 RX ORDER — DEXTROAMPHETAMINE SACCHARATE, AMPHETAMINE ASPARTATE, DEXTROAMPHETAMINE SULFATE AND AMPHETAMINE SULFATE 5; 5; 5; 5 MG/1; MG/1; MG/1; MG/1
20 TABLET ORAL DAILY
Qty: 30 TABLET | Refills: 0 | Status: SHIPPED | OUTPATIENT
Start: 2022-07-01 | End: 2022-08-08

## 2022-08-08 ENCOUNTER — MYC REFILL (OUTPATIENT)
Dept: FAMILY MEDICINE | Facility: CLINIC | Age: 47
End: 2022-08-08

## 2022-08-08 DIAGNOSIS — F90.0 ATTENTION DEFICIT HYPERACTIVITY DISORDER (ADHD), PREDOMINANTLY INATTENTIVE TYPE: ICD-10-CM

## 2022-08-08 RX ORDER — DEXTROAMPHETAMINE SACCHARATE, AMPHETAMINE ASPARTATE, DEXTROAMPHETAMINE SULFATE AND AMPHETAMINE SULFATE 5; 5; 5; 5 MG/1; MG/1; MG/1; MG/1
20 TABLET ORAL DAILY
Qty: 30 TABLET | Refills: 0 | Status: SHIPPED | OUTPATIENT
Start: 2022-08-08 | End: 2022-11-13

## 2022-08-08 ASSESSMENT — ANXIETY QUESTIONNAIRES
GAD7 TOTAL SCORE: 3
7. FEELING AFRAID AS IF SOMETHING AWFUL MIGHT HAPPEN: NOT AT ALL
7. FEELING AFRAID AS IF SOMETHING AWFUL MIGHT HAPPEN: NOT AT ALL
5. BEING SO RESTLESS THAT IT IS HARD TO SIT STILL: NOT AT ALL
1. FEELING NERVOUS, ANXIOUS, OR ON EDGE: SEVERAL DAYS
8. IF YOU CHECKED OFF ANY PROBLEMS, HOW DIFFICULT HAVE THESE MADE IT FOR YOU TO DO YOUR WORK, TAKE CARE OF THINGS AT HOME, OR GET ALONG WITH OTHER PEOPLE?: SOMEWHAT DIFFICULT
GAD7 TOTAL SCORE: 3
4. TROUBLE RELAXING: NOT AT ALL
GAD7 TOTAL SCORE: 3
2. NOT BEING ABLE TO STOP OR CONTROL WORRYING: NOT AT ALL
3. WORRYING TOO MUCH ABOUT DIFFERENT THINGS: SEVERAL DAYS
6. BECOMING EASILY ANNOYED OR IRRITABLE: SEVERAL DAYS
IF YOU CHECKED OFF ANY PROBLEMS ON THIS QUESTIONNAIRE, HOW DIFFICULT HAVE THESE PROBLEMS MADE IT FOR YOU TO DO YOUR WORK, TAKE CARE OF THINGS AT HOME, OR GET ALONG WITH OTHER PEOPLE: SOMEWHAT DIFFICULT

## 2022-08-08 ASSESSMENT — PATIENT HEALTH QUESTIONNAIRE - PHQ9
SUM OF ALL RESPONSES TO PHQ QUESTIONS 1-9: 3
SUM OF ALL RESPONSES TO PHQ QUESTIONS 1-9: 3
10. IF YOU CHECKED OFF ANY PROBLEMS, HOW DIFFICULT HAVE THESE PROBLEMS MADE IT FOR YOU TO DO YOUR WORK, TAKE CARE OF THINGS AT HOME, OR GET ALONG WITH OTHER PEOPLE: SOMEWHAT DIFFICULT

## 2022-08-15 ENCOUNTER — OFFICE VISIT (OUTPATIENT)
Dept: FAMILY MEDICINE | Facility: CLINIC | Age: 47
End: 2022-08-15
Payer: COMMERCIAL

## 2022-08-15 VITALS
HEART RATE: 76 BPM | SYSTOLIC BLOOD PRESSURE: 124 MMHG | WEIGHT: 154.6 LBS | BODY MASS INDEX: 27.39 KG/M2 | DIASTOLIC BLOOD PRESSURE: 64 MMHG

## 2022-08-15 DIAGNOSIS — F90.0 ATTENTION DEFICIT HYPERACTIVITY DISORDER (ADHD), PREDOMINANTLY INATTENTIVE TYPE: Primary | ICD-10-CM

## 2022-08-15 DIAGNOSIS — F40.10 SOCIAL PHOBIA INVOLVING FEAR OF PUBLIC SPEAKING: ICD-10-CM

## 2022-08-15 DIAGNOSIS — F40.10 SOCIAL ANXIETY DISORDER: ICD-10-CM

## 2022-08-15 PROCEDURE — 99214 OFFICE O/P EST MOD 30 MIN: CPT | Performed by: FAMILY MEDICINE

## 2022-08-15 RX ORDER — DEXTROAMPHETAMINE SACCHARATE, AMPHETAMINE ASPARTATE MONOHYDRATE, DEXTROAMPHETAMINE SULFATE AND AMPHETAMINE SULFATE 5; 5; 5; 5 MG/1; MG/1; MG/1; MG/1
20 CAPSULE, EXTENDED RELEASE ORAL DAILY
Qty: 30 CAPSULE | Refills: 0 | Status: SHIPPED | OUTPATIENT
Start: 2022-10-16 | End: 2022-11-15

## 2022-08-15 RX ORDER — DEXTROAMPHETAMINE SACCHARATE, AMPHETAMINE ASPARTATE MONOHYDRATE, DEXTROAMPHETAMINE SULFATE AND AMPHETAMINE SULFATE 5; 5; 5; 5 MG/1; MG/1; MG/1; MG/1
20 CAPSULE, EXTENDED RELEASE ORAL DAILY
Qty: 30 CAPSULE | Refills: 0 | Status: SHIPPED | OUTPATIENT
Start: 2022-09-15 | End: 2022-10-06

## 2022-08-15 RX ORDER — DEXTROAMPHETAMINE SACCHARATE, AMPHETAMINE ASPARTATE MONOHYDRATE, DEXTROAMPHETAMINE SULFATE AND AMPHETAMINE SULFATE 5; 5; 5; 5 MG/1; MG/1; MG/1; MG/1
20 CAPSULE, EXTENDED RELEASE ORAL DAILY
Qty: 30 CAPSULE | Refills: 0 | Status: SHIPPED | OUTPATIENT
Start: 2022-08-15 | End: 2022-09-14

## 2022-08-15 RX ORDER — PROPRANOLOL HYDROCHLORIDE 20 MG/1
20-40 TABLET ORAL 2 TIMES DAILY PRN
Qty: 30 TABLET | Refills: 1 | Status: SHIPPED | OUTPATIENT
Start: 2022-08-15 | End: 2023-04-06

## 2022-08-15 ASSESSMENT — PATIENT HEALTH QUESTIONNAIRE - PHQ9
SUM OF ALL RESPONSES TO PHQ QUESTIONS 1-9: 3
10. IF YOU CHECKED OFF ANY PROBLEMS, HOW DIFFICULT HAVE THESE PROBLEMS MADE IT FOR YOU TO DO YOUR WORK, TAKE CARE OF THINGS AT HOME, OR GET ALONG WITH OTHER PEOPLE: SOMEWHAT DIFFICULT

## 2022-08-15 ASSESSMENT — ANXIETY QUESTIONNAIRES: GAD7 TOTAL SCORE: 3

## 2022-08-15 NOTE — PROGRESS NOTES
"  Assessment & Plan     Attention deficit hyperactivity disorder (ADHD), predominantly inattentive type  Improving with re initiation of her Adderall; see prior note for details; does need the 20mg IR changed to XR.   - CSA signed today  - PMDP reviewed no concerns- last filled Adderall 20mg IR  #30 tabs on 8/8/22.  She will save these for weekends and just go to the XR version instead for the week. Will send 3 month supply of 20mg XR.   - Ok for video follow up in 3 months to reassess; plans for overall short duration Rx  - amphetamine-dextroamphetamine (ADDERALL XR) 20 MG 24 hr capsule; Take 1 capsule (20 mg) by mouth daily for 30 days  - amphetamine-dextroamphetamine (ADDERALL XR) 20 MG 24 hr capsule; Take 1 capsule (20 mg) by mouth daily for 30 days  - amphetamine-dextroamphetamine (ADDERALL XR) 20 MG 24 hr capsule; Take 1 capsule (20 mg) by mouth daily for 30 days    Social anxiety disorder  Social phobia involving fear of public speaking  Worse since the pandemic; we spent time talking about options for her anxiety in particular for the propranolol for her speaking/presentation anxiety. Side effects reviewed in detail. Pt agreeable; reviewed how to titrate to effect starting at 1/2 tablets.   - propranolol (INDERAL) 20 MG tablet; Take 1-2 tablets (20-40 mg) by mouth 2 times daily as needed (for anxiety prior to work presentations or social events)      BMI:   Estimated body mass index is 27.39 kg/m  as calculated from the following:    Height as of 2/22/22: 1.6 m (5' 3\").    Weight as of this encounter: 70.1 kg (154 lb 9.6 oz).       Return in about 6 months (around 2/15/2023) for Recheck.    Amanda Lin MD  [facility]   Leila is a 47 year old, presenting for the following health issues:  Recheck Medication (3 month follow up)    She was seen in May and started on her Adderall which had been helpful for her in childhood; 10mg XR dose not effective and " increased to 20mg with her last fill- it was accidentally sent as an IR and not XR though.      She has noticed the 20mg IR is wearing off toward the end of the day. Has been handling it overall and helping her tremendously.     Having worse anxiety in social circumstances ever since the pandemic. At works she has a hard time communicating when she is put on the spot- face flushes and gets nervous with social situations or work communicating. Has to be on video and this is stressful for her; learning a lot on electronic platforms.       History of Present Illness       Mental Health Follow-up:  Patient presents to follow-up on Depression & Anxiety.Patient's depression since last visit has been:  Better  The patient is not having other symptoms associated with depression.  Patient's anxiety since last visit has been:  Better  The patient is having other symptoms associated with anxiety.  Any significant life events: job concerns  Patient is feeling anxious or having panic attacks.  Patient has no concerns about alcohol or drug use.    She eats 2-3 servings of fruits and vegetables daily.She consumes 1 sweetened beverage(s) daily.She exercises with enough effort to increase her heart rate 20 to 29 minutes per day.  She exercises with enough effort to increase her heart rate 4 days per week.   She is taking medications regularly.    Today's PHQ-9         PHQ-9 Total Score: 3    PHQ-9 Q9 Thoughts of better off dead/self-harm past 2 weeks :   Not at all    How difficult have these problems made it for you to do your work, take care of things at home, or get along with other people: Somewhat difficult  Today's SAUL-7 Score: 3       Review of Systems   Constitutional, HEENT, cardiovascular, pulmonary, gi and gu systems are negative, except as otherwise noted.      Objective    /64 (BP Location: Left arm, Patient Position: Sitting, Cuff Size: Adult Regular)   Pulse 76   Wt 70.1 kg (154 lb 9.6 oz)   BMI 27.39 kg/m     Body mass index is 27.39 kg/m .  Physical Exam   GENERAL: healthy, alert and no distress  NECK: no adenopathy, no asymmetry, masses, or scars and thyroid normal to palpation  RESP: lungs clear to auscultation - no rales, rhonchi or wheezes  CV: regular rate and rhythm, normal S1 S2, no S3 or S4, no murmur, click or rub, no peripheral edema and peripheral pulses strong  MS: no gross musculoskeletal defects noted, no edema          .  ..

## 2022-08-15 NOTE — LETTER
[facility]  08/15/22  Patient: Nora Jay  YOB: 1975  Medical Record Number: [id number]                                                                                  Non-Opioid Controlled Substance Agreement  Adderall 20mg XR #30month, 3 refills  Follow up every 6 months  [place] [place]    This is an agreement between you and your provider regarding safe and appropriate use of controlled substances prescribed by your care team. Controlled substances are?medicines that can cause physical and mental dependence (abuse).     There are strict laws about having and using these medicines. We here at [facility] are  committed to working with you in your efforts to get better. To support you in this work, we'll help you schedule regular office appointments for medicine refills. If we must cancel or change your appointment for any reason, we'll make sure you have enough medicine to last until your next appointment.     As a Provider, I will:     Listen carefully to your concerns while treating you with respect.     Recommend a treatment plan that I believe is in your best interest and may involve therapies other than medicine.      Talk with you often about the possible benefits and the risk of harm of any medicine that we prescribe for you.    Assess the safety of this medicine and check how well it works.      Provide a plan on how to taper (discontinue or go off) using this medicine if the decision is made to stop its use.      ::  As a Patient, I understand controlled substances:       Are prescribed by my care provider to help me function or work and manage my condition(s).?    Are strong medicines and can cause serious side effects.       Need to be taken exactly as prescribed.?Combining controlled substances with certain medicines or chemicals (such as illegal drugs, alcohol, sedatives, sleeping pills, and benzodiazepines) can be dangerous or even fatal.? If I stop taking  my medicines suddenly, I may have severe withdrawal symptoms.     The risks, benefits, and side effects of these medicine(s) were explained to me. I agree that:    1. I will take part in other treatments as advised by my care team. This may be psychiatry or counseling, physical therapy, behavioral therapy, group treatment or a referral to specialist.    2. I will keep all my appointments and understand this is part of the monitoring of controlled substances.?My care team may require an office visit for EVERY controlled substance refill. If I miss appointments or don t follow instructions, my care team may stop my medicine    3. I will take my medicines as prescribed. I will not change the dose or schedule unless my care team tells me to. There will be no refills if I run out early.      4. I may be asked to come to the clinic and complete a urine drug test or complete a pill count. If I don t give a urine sample or participate in a pill count, the care team may stop my medicine.    5. I will only receive controlled substance prescriptions from this clinic. If I am treated by another provider, I will tell them that I am taking controlled substances and that I have a treatment agreement with this provider. I will inform my [facility] care team within one business day if I am given a prescription for any controlled substance by another healthcare provider. My [facility] care team can contact other providers and pharmacists about my use of any medicines.    6. It is up to me to make sure that I don't run out of my medicines on weekends or holidays.?If my care team is willing to refill my prescription without a visit, I must request refills only during office hours. Refills may take up to 3 business days to process. I will use one pharmacy to fill all my controlled substance prescriptions. I will notify the clinic about any changes to my insurance or medicine availability.    7. I am responsible for my  prescriptions. If the medicine/prescription is lost, stolen or destroyed, it will not be replaced.?I also agree not to share controlled substance medicines with anyone.     8. I am aware I should not use any illegal or recreational drugs. I agree not to drink alcohol unless my care team says I can.     9. If I enroll in the [place] Medical Cannabis program, I will tell my care team before my next refill.    10. I will tell my care team right away if I become pregnant, have a new medical problem treated outside of my regular clinic, or have a change in my medicines.     11. I understand that this medicine can affect my thinking, judgment and reaction time.? Alcohol and drugs affect the brain and body, which can affect the safety of my driving. Being under the influence of alcohol or drugs can affect my decision-making, behaviors, personal safety and the safety of others. Driving while impaired (DWI) can occur if a person is driving, operating or in physical control of a car, motorcycle, boat, snowmobile, ATV, motorbike, off-road vehicle or any other motor vehicle ([place] Statute 169A.20). I understand the risk if I choose to drive or operate any vehicle or machinery.    I understand that if I do not follow any of the conditions above, my prescriptions or treatment may be stopped or changed.   I agree that my provider, clinic care team and pharmacy may work with any city, state or federal law enforcement agency that investigates the misuse, sale or other diversion of my controlled medicine. I will allow my provider to discuss my care with, or share a copy of, this agreement with any other treating provider, pharmacy or emergency room where I receive care.     I have read this agreement and have asked questions about anything I did not understand.    ________________________________________________________  Patient Signature - Nora Jay     ___________________                   Date      ________________________________________________________  Provider Signature - Amanda Cynthia Riley, MD       ___________________                   Date     ________________________________________________________  Witness Signature (required if provider not present while patient signing)          ___________________                   Date

## 2022-10-06 ENCOUNTER — MYC REFILL (OUTPATIENT)
Dept: FAMILY MEDICINE | Facility: CLINIC | Age: 47
End: 2022-10-06

## 2022-10-06 DIAGNOSIS — F90.0 ATTENTION DEFICIT HYPERACTIVITY DISORDER (ADHD), PREDOMINANTLY INATTENTIVE TYPE: ICD-10-CM

## 2022-10-06 RX ORDER — DEXTROAMPHETAMINE SACCHARATE, AMPHETAMINE ASPARTATE MONOHYDRATE, DEXTROAMPHETAMINE SULFATE AND AMPHETAMINE SULFATE 5; 5; 5; 5 MG/1; MG/1; MG/1; MG/1
20 CAPSULE, EXTENDED RELEASE ORAL DAILY
Qty: 30 CAPSULE | Refills: 0 | Status: SHIPPED | OUTPATIENT
Start: 2022-10-06 | End: 2022-11-09

## 2022-10-15 ENCOUNTER — HEALTH MAINTENANCE LETTER (OUTPATIENT)
Age: 47
End: 2022-10-15

## 2022-11-09 ENCOUNTER — MYC REFILL (OUTPATIENT)
Dept: FAMILY MEDICINE | Facility: CLINIC | Age: 47
End: 2022-11-09

## 2022-11-09 DIAGNOSIS — F90.0 ATTENTION DEFICIT HYPERACTIVITY DISORDER (ADHD), PREDOMINANTLY INATTENTIVE TYPE: ICD-10-CM

## 2022-11-10 RX ORDER — DEXTROAMPHETAMINE SACCHARATE, AMPHETAMINE ASPARTATE MONOHYDRATE, DEXTROAMPHETAMINE SULFATE AND AMPHETAMINE SULFATE 5; 5; 5; 5 MG/1; MG/1; MG/1; MG/1
20 CAPSULE, EXTENDED RELEASE ORAL DAILY
Qty: 30 CAPSULE | Refills: 0 | Status: SHIPPED | OUTPATIENT
Start: 2022-11-10 | End: 2022-12-13

## 2022-11-11 DIAGNOSIS — Z86.19 HISTORY OF COLD SORES: ICD-10-CM

## 2022-11-11 RX ORDER — VALACYCLOVIR HYDROCHLORIDE 500 MG/1
TABLET, FILM COATED ORAL
Qty: 60 TABLET | Refills: 11 | Status: SHIPPED | OUTPATIENT
Start: 2022-11-11 | End: 2023-11-20

## 2022-11-13 ENCOUNTER — MYC REFILL (OUTPATIENT)
Dept: FAMILY MEDICINE | Facility: CLINIC | Age: 47
End: 2022-11-13

## 2022-11-13 DIAGNOSIS — F90.0 ATTENTION DEFICIT HYPERACTIVITY DISORDER (ADHD), PREDOMINANTLY INATTENTIVE TYPE: ICD-10-CM

## 2022-11-14 RX ORDER — DEXTROAMPHETAMINE SACCHARATE, AMPHETAMINE ASPARTATE, DEXTROAMPHETAMINE SULFATE AND AMPHETAMINE SULFATE 5; 5; 5; 5 MG/1; MG/1; MG/1; MG/1
20 TABLET ORAL DAILY
Qty: 30 TABLET | Refills: 0 | Status: SHIPPED | OUTPATIENT
Start: 2022-11-14 | End: 2023-04-06

## 2022-11-18 DIAGNOSIS — Z98.1 S/P CERVICAL SPINAL FUSION: Primary | ICD-10-CM

## 2022-11-25 ENCOUNTER — HOSPITAL ENCOUNTER (OUTPATIENT)
Dept: RADIOLOGY | Facility: CLINIC | Age: 47
Discharge: HOME | End: 2022-11-25
Attending: SURGERY | Admitting: SURGERY
Payer: COMMERCIAL

## 2022-11-25 DIAGNOSIS — Z98.1 S/P CERVICAL SPINAL FUSION: ICD-10-CM

## 2022-11-25 PROCEDURE — 72040 X-RAY EXAM NECK SPINE 2-3 VW: CPT

## 2022-11-29 ENCOUNTER — OFFICE VISIT (OUTPATIENT)
Dept: FAMILY MEDICINE | Facility: CLINIC | Age: 47
End: 2022-11-29
Payer: COMMERCIAL

## 2022-11-29 VITALS
OXYGEN SATURATION: 99 % | TEMPERATURE: 98.3 F | SYSTOLIC BLOOD PRESSURE: 120 MMHG | HEART RATE: 106 BPM | WEIGHT: 152 LBS | BODY MASS INDEX: 26.93 KG/M2 | DIASTOLIC BLOOD PRESSURE: 70 MMHG

## 2022-11-29 DIAGNOSIS — G57.02 COMPRESSION OF LEFT SCIATIC NERVE: ICD-10-CM

## 2022-11-29 DIAGNOSIS — M79.672 LEFT FOOT PAIN: Primary | ICD-10-CM

## 2022-11-29 DIAGNOSIS — G57.52 TARSAL TUNNEL SYNDROME OF LEFT SIDE: ICD-10-CM

## 2022-11-29 DIAGNOSIS — S83.412D COMPLETE TEAR OF MCL OF KNEE, LEFT, SUBSEQUENT ENCOUNTER: ICD-10-CM

## 2022-11-29 PROCEDURE — 99214 OFFICE O/P EST MOD 30 MIN: CPT | Performed by: FAMILY MEDICINE

## 2022-11-29 NOTE — PROGRESS NOTES
Assessment & Plan     Left foot pain- hx of Tarsal tunnel syndrome of left side  S/p left ankle/foot surgery 5-6 years ago; with reinjury/sprain about 3 years ago. Progressive worsening of sx. Has seen podiatry and [facility]; PATRICIA signed so we can review xrays/MRI reports. Also s/p cortisone injection to the ankle.  I do believe she has been compensating in her gait creating strain on the left knee as well as now a left sciatica compression at the buttocks area and as such I have recommended another course of physical therapy as its been a long time since she is done that for the foot.  I will also place referral to [facility]  For new consult to review her initial left ankle foot problems causing updward sx of knee/buttocks pains. At this point holding off on imaging of the low back as back pain is not a significant issue.  - not interested in medications  - Physical Therapy Referral; Future  - Orthopedic  Referral; Future    Complete tear of MCL of knee, left, subsequent encounter  Lots of clicking of the left knee audible on ambulation; advised revisiting MRI/conversation with ortho as has been seen for this in the past  - Physical Therapy Referral; Future  - Orthopedic  Referral; Future    Compression of left sciatic nerve  As above  - Physical Therapy Referral; Future  - Orthopedic  Referral; Future      34 minutes spent on the date of the encounter doing chart review, patient visit and documentation.        Return in about 3 months (around 2/28/2023) for Recheck.    Amanda Lin MD  [facility]    Courtney Dee is a 47 year old, presenting for the following health issues:  Musculoskeletal Problem (Left knee-ankle pain, gone to ortho & podiatrist ; can't find someone who seems to look thoroughly )      History of Present Illness       Reason for visit:  Left ankle, knee, and hip continued/progressing issue    She eats 2-3 servings of  "fruits and vegetables daily.She consumes 1 sweetened beverage(s) daily.She exercises with enough effort to increase her heart rate 20 to 29 minutes per day.  She exercises with enough effort to increase her heart rate 5 days per week.   She is taking medications regularly.      Pain and swelling of left ankle: Last saw me to discuss this concern in May;   Left ankle surgery 5-6 years ago and was doing great for a couple years  Re-injured the left ankle about May 2019 and told it was a bad ankle sprain by ortho, imaging then was reassuring. Did PT there for a period of time.    Since then: ongoing left ankle pain and swelling for a few years at this point; seems to be extending from ankle and under the base of her 4th toe with nerve type pain, radiating upwards to the knee and left buttocks now. \"consistently progresses through the outer left side of my knee and up to my left hip/lower back with occasional numbness in my middle to pinky toes. \"    Lots of clicking in her left knee  She ended up having an overcompensating motion picking up the laundry basket she tore her MCL in the left knee. She took time to heal that.     \"Currently, I am unable to stand/walk without pain and have a tough time getting to sleep.  It's sheer pain even to touch the left side of my knee, hip, and up to my mid back (just below rib cage) at this point.  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   Has done imaging through these specialty clinics (ltrasound, MRI)  Has bony formation around the screws in her ankle procedure location; there is swelling in this area and pinches her  Tried cortisone injection in the past- minimal improvement  Has been told she limps occasionally     On 11/18 she had severe muscle cramps in both legs but hasn't recurred since and declines any lab evals:   \"In my left leg from my " "knee - anterior shin - to my toes and right leg back of knee through calf.  I could not move my legs which was frightening and extremely painful.  I rolled off the bed and army crawled to throw up in the bathroom which is what happens to me when I experience severe pain. It lasted about 15-20 minutes.  I remember noticing that my left toes were numb last night when walking. \"      Testing of CBC, liver, kidney function and celiac  And thyroid all normal 5/2022.           Review of Systems         Objective    /70 (BP Location: Left arm, Patient Position: Sitting, Cuff Size: Adult Large)   Pulse 106   Temp 98.3  F (36.8  C) (Temporal)   Wt 68.9 kg (152 lb)   SpO2 99%   BMI 26.93 kg/m    Body mass index is 26.93 kg/m .  Physical Exam   GENERAL: healthy, alert and no distress  MS: normal muscle tone, tenderness to palpation at the left fourth toe on the plantar aspect of her foot with palpation recreates the nervelike sensation which radiates to the lateral aspect of her ankle and upwards to the left knee on the lateral side which has tenderness to palpation over the IT insertion area.  With ambulation there is a lot of clicking sounds on the left knee.  She also has tenderness to palpation at the left buttocks in the area where the sciatic nerve would be running, no SI joint pain.  No tenderness of the back or spinal column.  Negative seated straight leg raise.   BACK: no CVA tenderness, no paralumbar tenderness                    "

## 2022-12-08 ENCOUNTER — OFFICE VISIT (OUTPATIENT)
Dept: NEUROSURGERY | Facility: CLINIC | Age: 47
End: 2022-12-08
Payer: COMMERCIAL

## 2022-12-08 VITALS
OXYGEN SATURATION: 99 % | BODY MASS INDEX: 26.93 KG/M2 | SYSTOLIC BLOOD PRESSURE: 114 MMHG | HEIGHT: 63 IN | DIASTOLIC BLOOD PRESSURE: 75 MMHG | WEIGHT: 152 LBS | HEART RATE: 100 BPM

## 2022-12-08 DIAGNOSIS — M54.16 LUMBAR RADICULOPATHY: ICD-10-CM

## 2022-12-08 DIAGNOSIS — M54.12 CERVICAL RADICULOPATHY: Primary | ICD-10-CM

## 2022-12-08 PROCEDURE — 99214 OFFICE O/P EST MOD 30 MIN: CPT | Performed by: PHYSICIAN ASSISTANT

## 2022-12-08 NOTE — LETTER
12/8/2022         RE: Nora Jay  [address] [address]        Dear Colleague,    Thank you for referring your patient, Nora Jay, to the [facility]. Please see a copy of my visit note below.    Neurosurgery Progress Note: 12/8/2022     A/P:   Status post cervical 5-6 anterior cervical decompression and fusion on 2/16/21 by Dr. Mckeon    Plan:  Patient discussed with Dr. Mckeon will plan to obtain cervical and lumbar MRI with EMG/NCT of her right upper extremity and left lower extremity. Will plan for her to follow up once images and testing has been completed for further evaluation. She understands that should any symptoms worsen she understands to contact the office sooner.     Ms. Jay is a pleasant 47 year old right handed female who presents for further evaluation of her intermittent posterior cervical tightness and right upper extremity pain. She states that she was doing well after surgery with all symptoms resolved up until 3-4 months ago. She denies any injury or trauma at the onset. She began to have posterior cervical tightness and pain that extends into her bilateral shoulders. She notes that her pain will radiate into her right upper extremity into her forearm and thumb and pointer and is accompanied with numbness and tingling. She notes that her arm pain and numbness occurs with repetitive overhead motions or with elbow bending and brushing her hair. She denies any weakness. She notes that when the pain in her arm presents she will straighten out her arm and apply pressure to her elbow with some relief. She also notes progressive left lower extremity pain. She has had ongoing foot pathology with tarsal tunnel syndrome and states that she has altered her gait and has noted extension of her pain from her foot to her knee and now up into her buttocks and hip. She notes slight numbness of the lateral aspect of her left foot and a tender hard  "point on the pad of her foot by her middle toe. She states that with prolonged sitting or standing that her leg pain is worsened.        Exam:  /75   Pulse 100   Ht 1.6 m (5' 3\")   Wt 68.9 kg (152 lb)   SpO2 99%   BMI 26.93 kg/m      General: alert and oriented x3     Strength is 5/5 throughout both upper and lower extremities throughout.    Pain to palpation of left SI joint     Sensation is intact throughout both upper and lower extremities    Gait is smooth and coordinated    Incision: well healed without erythema, induration, or drainage      Imaging:  Xray reviewed personally with good hardware placement and stable straightening of cervical alignment     Ivone Mart PA-C  [facility] Neurosurgery  O: [phone]      Again, thank you for allowing me to participate in the care of your patient.        Sincerely,        Ivone Mart PA-C    "

## 2022-12-08 NOTE — PATIENT INSTRUCTIONS
Patient discussed with Dr. Mckeon will plan to obtain cervical and lumbar MRI with EMG/NCT of her right upper extremity and left lower extremity. Will plan for her to follow up once images and testing has been completed for further evaluation. She understands that should any symptoms worsen she understands to contact the office sooner.

## 2022-12-08 NOTE — PROGRESS NOTES
"Neurosurgery Progress Note: 12/8/2022     A/P:   Status post cervical 5-6 anterior cervical decompression and fusion on 2/16/21 by Dr. Mckeon    Plan:  Patient discussed with Dr. Mckeon will plan to obtain cervical and lumbar MRI with EMG/NCT of her right upper extremity and left lower extremity. Will plan for her to follow up once images and testing has been completed for further evaluation. She understands that should any symptoms worsen she understands to contact the office sooner.     Ms. Jay is a pleasant 47 year old right handed female who presents for further evaluation of her intermittent posterior cervical tightness and right upper extremity pain. She states that she was doing well after surgery with all symptoms resolved up until 3-4 months ago. She denies any injury or trauma at the onset. She began to have posterior cervical tightness and pain that extends into her bilateral shoulders. She notes that her pain will radiate into her right upper extremity into her forearm and thumb and pointer and is accompanied with numbness and tingling. She notes that her arm pain and numbness occurs with repetitive overhead motions or with elbow bending and brushing her hair. She denies any weakness. She notes that when the pain in her arm presents she will straighten out her arm and apply pressure to her elbow with some relief. She also notes progressive left lower extremity pain. She has had ongoing foot pathology with tarsal tunnel syndrome and states that she has altered her gait and has noted extension of her pain from her foot to her knee and now up into her buttocks and hip. She notes slight numbness of the lateral aspect of her left foot and a tender hard point on the pad of her foot by her middle toe. She states that with prolonged sitting or standing that her leg pain is worsened.        Exam:  /75   Pulse 100   Ht 1.6 m (5' 3\")   Wt 68.9 kg (152 lb)   SpO2 99%   BMI 26.93 kg/m      General: " alert and oriented x3     Strength is 5/5 throughout both upper and lower extremities throughout.    Pain to palpation of left SI joint     Sensation is intact throughout both upper and lower extremities    Gait is smooth and coordinated    Incision: well healed without erythema, induration, or drainage      Imaging:  Xray reviewed personally with good hardware placement and stable straightening of cervical alignment     Ivone Mart PA-C  [facility] Neurosurgery  O: [phone]

## 2022-12-12 ENCOUNTER — MYC MEDICAL ADVICE (OUTPATIENT)
Dept: NEUROSURGERY | Facility: CLINIC | Age: 47
End: 2022-12-12

## 2022-12-12 DIAGNOSIS — M54.16 LUMBAR RADICULOPATHY: ICD-10-CM

## 2022-12-12 DIAGNOSIS — M54.12 CERVICAL RADICULOPATHY: Primary | ICD-10-CM

## 2022-12-13 ENCOUNTER — MYC REFILL (OUTPATIENT)
Dept: FAMILY MEDICINE | Facility: CLINIC | Age: 47
End: 2022-12-13

## 2022-12-13 DIAGNOSIS — F90.0 ATTENTION DEFICIT HYPERACTIVITY DISORDER (ADHD), PREDOMINANTLY INATTENTIVE TYPE: ICD-10-CM

## 2022-12-13 RX ORDER — DIAZEPAM 5 MG
5 TABLET ORAL EVERY 6 HOURS PRN
Qty: 4 TABLET | Refills: 0 | Status: SHIPPED | OUTPATIENT
Start: 2022-12-13 | End: 2023-06-07

## 2022-12-13 NOTE — TELEPHONE ENCOUNTER
Valium prescription sent to pharmacy for patient to complete MRI and EMG   Will be advised to take 5mg 1 hour prior and 30 minutes to imaging     Ivone Mart PA-C  [facility] Neurosurgery  O: [phone]

## 2022-12-15 ENCOUNTER — TELEPHONE (OUTPATIENT)
Dept: NEUROSURGERY | Facility: CLINIC | Age: 47
End: 2022-12-15

## 2022-12-15 RX ORDER — DEXTROAMPHETAMINE SACCHARATE, AMPHETAMINE ASPARTATE MONOHYDRATE, DEXTROAMPHETAMINE SULFATE AND AMPHETAMINE SULFATE 5; 5; 5; 5 MG/1; MG/1; MG/1; MG/1
20 CAPSULE, EXTENDED RELEASE ORAL DAILY
Qty: 30 CAPSULE | Refills: 0 | Status: SHIPPED | OUTPATIENT
Start: 2022-12-15 | End: 2023-01-16

## 2022-12-15 NOTE — TELEPHONE ENCOUNTER
Patient imaging got denied due to insurance. Patient would like to know other options.     Please advise    BT

## 2022-12-16 NOTE — TELEPHONE ENCOUNTER
Called pt and informed her that our provider will be reaching out to her insurance company next week to appeal the denial.     She verbalized understanding and had no further questions.     Becky Diallo RN

## 2022-12-16 NOTE — CONFIDENTIAL NOTE
Per the [facility] rep, both of the MRIs were denied d/t improper medical evidence to support necessity to include no muscle weakness of 3/5 of greater and the pt not receiving at least 6 weeks of treatment (to include medication management) without improvement. [facility] did not have the correct fax number for our clinic, hence we did not receive the denial letter. Corrected this and asked that the denial letter be sent so that we can review it.     Per the rep, [id number] is available and expires on 12/20/22. Scheduled for 8:30 am 12/20 with Ivone Mart with Dr. Isa Diallo RN

## 2022-12-19 ENCOUNTER — TELEPHONE (OUTPATIENT)
Dept: NEUROSURGERY | Facility: CLINIC | Age: 47
End: 2022-12-19

## 2022-12-19 DIAGNOSIS — M54.16 LUMBAR RADICULOPATHY: ICD-10-CM

## 2022-12-19 DIAGNOSIS — M54.12 CERVICAL RADICULOPATHY: Primary | ICD-10-CM

## 2022-12-19 NOTE — TELEPHONE ENCOUNTER
Returned call to pt and clarified there is no patient participation required during a [id number] appeal for insurance denials. Will keep her updated of the outcome. Apologized for the confusion. She verbalized satisfaction and no other questions.     Becky Diallo RN

## 2022-12-19 NOTE — TELEPHONE ENCOUNTER
Patient called stating if the telephone appointment on 12/20/22 is still needed due to her insurance cancelling her MRI.     Please advise patient.     Per patient it is ok to LDVM if she is unable to answer.     BT

## 2022-12-20 ENCOUNTER — MEDICAL CORRESPONDENCE (OUTPATIENT)
Dept: NEUROSURGERY | Facility: CLINIC | Age: 47
End: 2022-12-20

## 2022-12-20 NOTE — TELEPHONE ENCOUNTER
Called pt to inform her that the appeal was denied for the cervical and lumbar MRIs. An order has been placed for 6 weeks of physical therapy is needed for both cervical and lumbar to document whether this intervention yields any improvement in her symptoms. Pt will keep us updated should her symptoms worsen with the initiation of PT.     Pt would like to go to [facility] in [place] for physical therapy. New patient referral fax number:[phone].    Becky Diallo RN

## 2023-01-16 ENCOUNTER — OFFICE VISIT (OUTPATIENT)
Dept: PHYSICAL MEDICINE AND REHAB | Facility: CLINIC | Age: 48
End: 2023-01-16
Attending: PHYSICIAN ASSISTANT
Payer: COMMERCIAL

## 2023-01-16 ENCOUNTER — MYC REFILL (OUTPATIENT)
Dept: FAMILY MEDICINE | Facility: CLINIC | Age: 48
End: 2023-01-16

## 2023-01-16 DIAGNOSIS — F90.0 ATTENTION DEFICIT HYPERACTIVITY DISORDER (ADHD), PREDOMINANTLY INATTENTIVE TYPE: ICD-10-CM

## 2023-01-16 DIAGNOSIS — M54.16 LUMBAR RADICULOPATHY: ICD-10-CM

## 2023-01-16 DIAGNOSIS — M54.12 CERVICAL RADICULOPATHY: ICD-10-CM

## 2023-01-16 PROCEDURE — 95886 MUSC TEST DONE W/N TEST COMP: CPT | Mod: RT | Performed by: PHYSICAL MEDICINE & REHABILITATION

## 2023-01-16 PROCEDURE — 95912 NRV CNDJ TEST 11-12 STUDIES: CPT | Performed by: PHYSICAL MEDICINE & REHABILITATION

## 2023-01-16 NOTE — PATIENT INSTRUCTIONS
Thank you for choosing the [facility] for your EMG testing.    The ordering provider will receive your final EMG results within the next few days.  Please follow up with your provider for the results and further treatment recommendations.

## 2023-01-16 NOTE — PROGRESS NOTES
[facility]  [address] [place] [address] [address]  [place] [address]  Office: [phone] Fax: [phone]    Electromyography and Nerve Conduction Study Report        Indication: Patient presents at the request of Ivone Mart PA-C for right upper extremity and left lower extremity EMG.  She has prior C5-6 ACDF.  She has right upper extremity pain and paresthesias digit 1 up the forearm towards the deltoid.  She is right-handed.  Feels weakness and spasm through the right arm.  She also has left sided gluteal pain and low back pain with lower extremity paresthesias to digits 3 through 5.  On exam she has  decreased sensation to light touch left digit 1 in the upper extremity.  Remainder of upper and lower extremities are unremarkable.  Normal reflexes of the upper and lower extremities.  Normal muscle strength throughout the major muscle groups of the upper and lower extremities.      Pt Exam Discussion (Communication Barriers):  Electromyography and nerve conduction testing, including associated discomfort, was discussed with the patient prior to the procedure.  No learning/ communication barriers; patient verbalized understanding of procedure.  Informed consent was obtained.           Pt Assessment:  Testing was successfully completed; patient tolerated testing well.       Blood Thinners: None Skin Temperature: Warmed  30.4 LLE                     EMG/NCS  results:     Nerve Conduction Studies  Motor Sites      Amplitude Segment CV Distal Latency F-Latency F-Estimate Temp Comment   Site (mV)  (m/s) (ms) ms ms  C    Left Fibular (EDB) Motor   Ankle 3.0 Ankle-EDB  4.9   22.7    Knee 3.8 Knee-Ankle 52 12.3   22.7    Right Median (APB) Motor   Wrist 11.0 Wrist-APB  3.3   22.7    Elbow 9.7 Elbow-Wrist 56 7.2   22.7    Left Tibial (AH) Motor   Ankle 18.6 Ankle-AH  3.1   22.8    Knee 9.4 Knee-Ankle 49 11.4   22.9    Right Ulnar (ADM) Motor   Wrist 11.4   2.6   22.7    Bel Elbow 10.7 Bel  Elbow-Wrist 59 6.0   22.7    Ab Elbow 11.5 Ab Elbow-Wrist 67 7.5   22.7      Sensory Sites      Amplitude CV Onset Lat Peak Lat Temp Comment   Site ( V) (m/s) (ms) (ms)  C    Right Median Anti Sensory   Wrist-Dig II 40 52 2.5 3.1 24.3    Right Median-Ulnar Palmar Sensory        Median   Palm-Wrist 161 52 1.55 2.0 22.7         Ulnar   Palm-Wrist 41 60 1.33 1.78 22.6    Left Sural Sensory   B-Ankle 23 54 2.6 3.3 22.7    Right Ulnar Anti Sensory   Wrist-Dig V 29 55 2.0 2.5 23.4      H-Reflex Sites      M-Lat H Lat M Neg Amp   Site (ms) (ms) mV   Left Tibial (Soleus) H-Reflex   Pop Fossa 5.5 30.7 7.6   Right Tibial (Soleus) H-Reflex   Pop Fossa 6.9 30.5 1.41     H-Reflex Sites      Lt. H Lat Rt. H Lat L-R H Lat   Site (ms) (ms) (ms) Norm   Tibial (Soleus) H-Reflex   Pop Fossa 30.7 30.5 0.20  < 3.0       NCS Waveforms:    Motor                Sensory                H-Reflex           Electromyography     Side Muscle Nerve Root Ins Act Fibs Psw Amp Dur Poly Recrt Int Pat Comment   Right Deltoid Axillary C5-6 Nml Nml Nml Nml Nml 0 Nml Nml    Right Triceps Radial C6-7-8 Nml Nml Nml Nml Nml 0 Nml Nml    Right Biceps2 Musculocut C5-6 Nml Nml Nml Nml Nml 0 Nml Nml    Right PronatorTeres Median C6-7 Nml Nml Nml Nml +/- 0 Nml Nml    Right 1stDorInt Ulnar C8-T1 Nml Nml Nml Nml Nml 0 Nml Nml    Right Abd Poll Brev Median C8-T1 Nml Nml Nml Nml Nml 0 Nml Nml    Left AntTibialis Dp Br Fibular L4-5 Nml Nml Nml Nml Nml 0 Nml Nml    Left Gastroc Tibial S1-2 Nml Nml Nml Nml Nml 0 Nml Nml    Left Fibularis Long Sup Br Fibular L5-S1 Nml Nml Nml Nml Nml 0 Nml Nml    Left VastusLat Femoral L2-4 Nml Nml Nml Nml Nml 0 Nml Nml    Left RectFemoris Femoral L2-4 Nml Nml Nml Nml Nml 0 Nml Nml          Comment NCS: Normal study  1.  Normal right median and ulnar SNAPs, median and ulnar transcarpal studies, and median and ulnar CMAPs.  2.  Normal left lower extremity NCS including left sural SNAP, peroneal and tibial CMAPs, and symmetric tibial H  reflexes.    Comment EMG: Normal study  1.  Normal needle EMG right upper extremity.  2.  Normal needle EMG left lower extremity.    Interpretation: Normal study:    1. There is no electrodiagnostic evidence of cervical radiculopathy, brachial plexopathy, or focal neuropathy in the right upper extremity.  Specifically, there is no electrodiagnostic evidence of a C6 radiculopathy in the right upper extremity.    2.  There is no electrodiagnostic evidence of lumbosacral radiculopathy, lumbosacral plexopathy, or focal neuropathy in the left lower extremity.    The testing was completed in its entirety by the physician.       It was our pleasure caring for your patient today, if there any questions or concerns please do not hesitate to contact us.

## 2023-01-16 NOTE — LETTER
1/16/2023         RE: Nora Jay  [address] [facility] [address]        Dear Colleague,    Thank you for referring your patient, Nora Jay, to the [facility]. Please see a copy of my visit note below.    [facility]  [address] [place] [address] [address]  [place] [address]  Office: [phone] Fax: [phone]    Electromyography and Nerve Conduction Study Report        Indication: Patient presents at the request of Ivone Mart PA-C for right upper extremity and left lower extremity EMG.  She has prior C5-6 ACDF.  She has right upper extremity pain and paresthesias digit 1 up the forearm towards the deltoid.  She is right-handed.  Feels weakness and spasm through the right arm.  She also has left sided gluteal pain and low back pain with lower extremity paresthesias to digits 3 through 5.  On exam she has  decreased sensation to light touch left digit 1 in the upper extremity.  Remainder of upper and lower extremities are unremarkable.  Normal reflexes of the upper and lower extremities.  Normal muscle strength throughout the major muscle groups of the upper and lower extremities.      Pt Exam Discussion (Communication Barriers):  Electromyography and nerve conduction testing, including associated discomfort, was discussed with the patient prior to the procedure.  No learning/ communication barriers; patient verbalized understanding of procedure.  Informed consent was obtained.           Pt Assessment:  Testing was successfully completed; patient tolerated testing well.       Blood Thinners: None Skin Temperature: Warmed  30.4 LLE                     EMG/NCS  results:     Nerve Conduction Studies  Motor Sites      Amplitude Segment CV Distal Latency F-Latency F-Estimate Temp Comment   Site (mV)  (m/s) (ms) ms ms  C    Left Fibular (EDB) Motor   Ankle 3.0 Ankle-EDB  4.9   22.7    Knee 3.8 Knee-Ankle 52 12.3   22.7    Right Median (APB) Motor    Wrist 11.0 Wrist-APB  3.3   22.7    Elbow 9.7 Elbow-Wrist 56 7.2   22.7    Left Tibial (AH) Motor   Ankle 18.6 Ankle-AH  3.1   22.8    Knee 9.4 Knee-Ankle 49 11.4   22.9    Right Ulnar (ADM) Motor   Wrist 11.4   2.6   22.7    Bel Elbow 10.7 Bel Elbow-Wrist 59 6.0   22.7    Ab Elbow 11.5 Ab Elbow-Wrist 67 7.5   22.7      Sensory Sites      Amplitude CV Onset Lat Peak Lat Temp Comment   Site ( V) (m/s) (ms) (ms)  C    Right Median Anti Sensory   Wrist-Dig II 40 52 2.5 3.1 24.3    Right Median-Ulnar Palmar Sensory        Median   Palm-Wrist 161 52 1.55 2.0 22.7         Ulnar   Palm-Wrist 41 60 1.33 1.78 22.6    Left Sural Sensory   B-Ankle 23 54 2.6 3.3 22.7    Right Ulnar Anti Sensory   Wrist-Dig V 29 55 2.0 2.5 23.4      H-Reflex Sites      M-Lat H Lat M Neg Amp   Site (ms) (ms) mV   Left Tibial (Soleus) H-Reflex   Pop Fossa 5.5 30.7 7.6   Right Tibial (Soleus) H-Reflex   Pop Fossa 6.9 30.5 1.41     H-Reflex Sites      Lt. H Lat Rt. H Lat L-R H Lat   Site (ms) (ms) (ms) Norm   Tibial (Soleus) H-Reflex   Pop Fossa 30.7 30.5 0.20  < 3.0       NCS Waveforms:    Motor                Sensory                H-Reflex           Electromyography     Side Muscle Nerve Root Ins Act Fibs Psw Amp Dur Poly Recrt Int Pat Comment   Right Deltoid Axillary C5-6 Nml Nml Nml Nml Nml 0 Nml Nml    Right Triceps Radial C6-7-8 Nml Nml Nml Nml Nml 0 Nml Nml    Right Biceps2 Musculocut C5-6 Nml Nml Nml Nml Nml 0 Nml Nml    Right PronatorTeres Median C6-7 Nml Nml Nml Nml +/- 0 Nml Nml    Right 1stDorInt Ulnar C8-T1 Nml Nml Nml Nml Nml 0 Nml Nml    Right Abd Poll Brev Median C8-T1 Nml Nml Nml Nml Nml 0 Nml Nml    Left AntTibialis Dp Br Fibular L4-5 Nml Nml Nml Nml Nml 0 Nml Nml    Left Gastroc Tibial S1-2 Nml Nml Nml Nml Nml 0 Nml Nml    Left Fibularis Long Sup Br Fibular L5-S1 Nml Nml Nml Nml Nml 0 Nml Nml    Left VastusLat Femoral L2-4 Nml Nml Nml Nml Nml 0 Nml Nml    Left RectFemoris Femoral L2-4 Nml Nml Nml Nml Nml 0 Nml Nml           Comment NCS: Normal study  1.  Normal right median and ulnar SNAPs, median and ulnar transcarpal studies, and median and ulnar CMAPs.  2.  Normal left lower extremity NCS including left sural SNAP, peroneal and tibial CMAPs, and symmetric tibial H reflexes.    Comment EMG: Normal study  1.  Normal needle EMG right upper extremity.  2.  Normal needle EMG left lower extremity.    Interpretation: Normal study:    1. There is no electrodiagnostic evidence of cervical radiculopathy, brachial plexopathy, or focal neuropathy in the right upper extremity.  Specifically, there is no electrodiagnostic evidence of a C6 radiculopathy in the right upper extremity.    2.  There is no electrodiagnostic evidence of lumbosacral radiculopathy, lumbosacral plexopathy, or focal neuropathy in the left lower extremity.    The testing was completed in its entirety by the physician.       It was our pleasure caring for your patient today, if there any questions or concerns please do not hesitate to contact us.        Again, thank you for allowing me to participate in the care of your patient.        Sincerely,        Ricardo Shah, DO

## 2023-01-18 RX ORDER — DEXTROAMPHETAMINE SACCHARATE, AMPHETAMINE ASPARTATE MONOHYDRATE, DEXTROAMPHETAMINE SULFATE AND AMPHETAMINE SULFATE 5; 5; 5; 5 MG/1; MG/1; MG/1; MG/1
20 CAPSULE, EXTENDED RELEASE ORAL DAILY
Qty: 30 CAPSULE | Refills: 0 | Status: SHIPPED | OUTPATIENT
Start: 2023-01-18 | End: 2023-02-19

## 2023-02-19 ENCOUNTER — MYC REFILL (OUTPATIENT)
Dept: FAMILY MEDICINE | Facility: CLINIC | Age: 48
End: 2023-02-19
Payer: COMMERCIAL

## 2023-02-19 DIAGNOSIS — F90.0 ATTENTION DEFICIT HYPERACTIVITY DISORDER (ADHD), PREDOMINANTLY INATTENTIVE TYPE: ICD-10-CM

## 2023-02-21 RX ORDER — DEXTROAMPHETAMINE SACCHARATE, AMPHETAMINE ASPARTATE MONOHYDRATE, DEXTROAMPHETAMINE SULFATE AND AMPHETAMINE SULFATE 5; 5; 5; 5 MG/1; MG/1; MG/1; MG/1
20 CAPSULE, EXTENDED RELEASE ORAL DAILY
Qty: 30 CAPSULE | Refills: 0 | Status: SHIPPED | OUTPATIENT
Start: 2023-02-21 | End: 2023-03-19

## 2023-03-06 ENCOUNTER — MYC MEDICAL ADVICE (OUTPATIENT)
Dept: FAMILY MEDICINE | Facility: CLINIC | Age: 48
End: 2023-03-06
Payer: COMMERCIAL

## 2023-03-19 ENCOUNTER — MYC REFILL (OUTPATIENT)
Dept: FAMILY MEDICINE | Facility: CLINIC | Age: 48
End: 2023-03-19
Payer: COMMERCIAL

## 2023-03-19 DIAGNOSIS — F90.0 ATTENTION DEFICIT HYPERACTIVITY DISORDER (ADHD), PREDOMINANTLY INATTENTIVE TYPE: ICD-10-CM

## 2023-03-20 RX ORDER — DEXTROAMPHETAMINE SACCHARATE, AMPHETAMINE ASPARTATE MONOHYDRATE, DEXTROAMPHETAMINE SULFATE AND AMPHETAMINE SULFATE 5; 5; 5; 5 MG/1; MG/1; MG/1; MG/1
20 CAPSULE, EXTENDED RELEASE ORAL DAILY
Qty: 30 CAPSULE | Refills: 0 | Status: SHIPPED | OUTPATIENT
Start: 2023-03-20 | End: 2023-04-06

## 2023-03-20 NOTE — TELEPHONE ENCOUNTER
I have approximately 1 week left of my medication and my appointment is on 4/6/23. Please send refill this week if possible as I am traveling for work Friday 3/24. Thank you!

## 2023-04-06 ENCOUNTER — OFFICE VISIT (OUTPATIENT)
Dept: FAMILY MEDICINE | Facility: CLINIC | Age: 48
End: 2023-04-06
Payer: COMMERCIAL

## 2023-04-06 VITALS
OXYGEN SATURATION: 99 % | SYSTOLIC BLOOD PRESSURE: 138 MMHG | WEIGHT: 153.5 LBS | DIASTOLIC BLOOD PRESSURE: 80 MMHG | HEART RATE: 87 BPM | BODY MASS INDEX: 27.19 KG/M2

## 2023-04-06 DIAGNOSIS — F90.0 ATTENTION DEFICIT HYPERACTIVITY DISORDER (ADHD), PREDOMINANTLY INATTENTIVE TYPE: Primary | ICD-10-CM

## 2023-04-06 DIAGNOSIS — F40.10 SOCIAL ANXIETY DISORDER: ICD-10-CM

## 2023-04-06 DIAGNOSIS — F40.10 SOCIAL PHOBIA INVOLVING FEAR OF PUBLIC SPEAKING: ICD-10-CM

## 2023-04-06 PROCEDURE — 99213 OFFICE O/P EST LOW 20 MIN: CPT | Performed by: FAMILY MEDICINE

## 2023-04-06 RX ORDER — DEXTROAMPHETAMINE SACCHARATE, AMPHETAMINE ASPARTATE MONOHYDRATE, DEXTROAMPHETAMINE SULFATE AND AMPHETAMINE SULFATE 5; 5; 5; 5 MG/1; MG/1; MG/1; MG/1
20 CAPSULE, EXTENDED RELEASE ORAL DAILY
Qty: 30 CAPSULE | Refills: 0 | Status: SHIPPED | OUTPATIENT
Start: 2023-04-06 | End: 2023-05-06

## 2023-04-06 RX ORDER — PROPRANOLOL HYDROCHLORIDE 20 MG/1
20-40 TABLET ORAL 2 TIMES DAILY PRN
Qty: 30 TABLET | Refills: 1 | Status: SHIPPED | OUTPATIENT
Start: 2023-04-06 | End: 2023-11-20

## 2023-04-06 RX ORDER — DEXTROAMPHETAMINE SACCHARATE, AMPHETAMINE ASPARTATE MONOHYDRATE, DEXTROAMPHETAMINE SULFATE AND AMPHETAMINE SULFATE 5; 5; 5; 5 MG/1; MG/1; MG/1; MG/1
20 CAPSULE, EXTENDED RELEASE ORAL DAILY
Qty: 30 CAPSULE | Refills: 0 | Status: SHIPPED | OUTPATIENT
Start: 2023-06-07 | End: 2023-07-05

## 2023-04-06 RX ORDER — DEXTROAMPHETAMINE SACCHARATE, AMPHETAMINE ASPARTATE MONOHYDRATE, DEXTROAMPHETAMINE SULFATE AND AMPHETAMINE SULFATE 5; 5; 5; 5 MG/1; MG/1; MG/1; MG/1
20 CAPSULE, EXTENDED RELEASE ORAL DAILY
Qty: 30 CAPSULE | Refills: 0 | Status: SHIPPED | OUTPATIENT
Start: 2023-05-07 | End: 2023-06-06

## 2023-04-06 NOTE — PROGRESS NOTES
"  Assessment & Plan     Attention deficit hyperactivity disorder (ADHD), predominantly inattentive type  Improved with re initiation of her Adderall; see prior note for details; does need the 20mg IR changed to XR.   - CSA signed 8/2022  - PMDP reviewed no concerns- will send 3 month Rx for 20mg XR Adderall  - Ok for annual physical in 3-6 months to reassess  - amphetamine-dextroamphetamine (ADDERALL XR) 20 MG 24 hr capsule; Take 1 capsule (20 mg) by mouth daily for 30 days  - amphetamine-dextroamphetamine (ADDERALL XR) 20 MG 24 hr capsule; Take 1 capsule (20 mg) by mouth daily for 30 days  - amphetamine-dextroamphetamine (ADDERALL XR) 20 MG 24 hr capsule; Take 1 capsule (20 mg) by mouth daily for 30 days     Social anxiety disorder  Social phobia involving fear of public speaking  Substantial improvement with PRN use of propranolol   - propranolol (INDERAL) 20 MG tablet; Take 1-2 tablets (20-40 mg) by mouth 2 times daily as needed (for anxiety prior to work presentations or social events)       BMI:   Estimated body mass index is 27.19 kg/m  as calculated from the following:    Height as of 12/8/22: 1.6 m (5' 3\").    Weight as of this encounter: 69.6 kg (153 lb 8 oz).       Return in about 6 months (around 10/6/2023) for Annual physical.    Amanda Lin MD  [facility]    Courtney Dee is a 48 year old, presenting for the following health issues:  Recheck Medication        4/6/2023     2:25 PM   Additional Questions   Roomed by Area F, VF     History of Present Illness       Reason for visit:  Follow up for medication    She eats 2-3 servings of fruits and vegetables daily.She consumes 1 sweetened beverage(s) daily.She exercises with enough effort to increase her heart rate 20 to 29 minutes per day.  She exercises with enough effort to increase her heart rate 5 days per week.   She is taking medications regularly.       ADHD: doing very well with her Adderall 20mg ER " "daily- not noticing as much \"immediate high focus effect\" but still aishwarya to concentrate and complete work and home tasks on this   Unrelated to the medication:  She reports her son and a friend have reported some night terrors when  they hear her sleep, she is going to start more mindfulness activities at night and mointor    She tried the propranolol and LOVED the response to prevent her social anxiety/public speaking concerns. Very effective for her.      She did do physical therapy and doing home exercise program religiously. Working on strengthening the ankle and leg (left side). She notes with deep squat the knee on the left side tips medially and recreates her pains.  Too expensive to keep going to PT sessions.  She got inserts from her shoes.   She hasn't yet followed up with [facility] consult and will reconsider.             Review of Systems         Objective    /80 (BP Location: Left arm, Patient Position: Sitting, Cuff Size: Adult Regular)   Pulse 87   Wt 69.6 kg (153 lb 8 oz)   SpO2 99%   BMI 27.19 kg/m    Body mass index is 27.19 kg/m .  Physical Exam   General: Alert, no acute distress. Somewhat distracted during conversation.  HEENT: normocephalic conjunctivae are clear. EOMI  Neck: supple   Lungs: Normal effort  Heart: regular rate  Abdomen: soft   Skin: clear without rash or lesions  Neuro: alert, oriented  MSK:  normal muscle tone, previously known tenderness to palpation at the left fourth toe on the plantar aspect of her foot with palpation recreates the nervelike sensation which radiates to the lateral aspect of her left ankle and upwards to the left knee on the lateral side which has tenderness to palpation over the IT insertion area. With deep squat the left hip does rotate medially creating inward motion of the knee and strain on her lateral IT band area. With ambulation there are clicking sounds on the left knee.    Psych: mood good, affect appropriate, good eye contact, insight " and judgment intact, normal speech pattern.

## 2023-04-17 ENCOUNTER — MYC MEDICAL ADVICE (OUTPATIENT)
Dept: FAMILY MEDICINE | Facility: CLINIC | Age: 48
End: 2023-04-17
Payer: COMMERCIAL

## 2023-04-17 DIAGNOSIS — M79.18 MYOFASCIAL PAIN: ICD-10-CM

## 2023-04-18 NOTE — TELEPHONE ENCOUNTER
Routing refill request to provider for review/approval because:  Drug not on the [facility] refill protocol     Last Written Prescription Date:  12/22/2021  Last Fill Quantity: 30,  # refills: 3   Last office visit provider:  4/6/2023     Requested Prescriptions   Pending Prescriptions Disp Refills     cyclobenzaprine (FLEXERIL) 10 MG tablet 30 tablet 3     Sig: Take 1 tablet (10 mg) by mouth nightly as needed for muscle spasms       There is no refill protocol information for this order          Anne Yeager RN 04/18/23 9:13 AM

## 2023-04-19 RX ORDER — CYCLOBENZAPRINE HCL 10 MG
10 TABLET ORAL
Qty: 30 TABLET | Refills: 3 | Status: SHIPPED | OUTPATIENT
Start: 2023-04-19 | End: 2023-11-20

## 2023-05-15 ENCOUNTER — E-VISIT (OUTPATIENT)
Dept: FAMILY MEDICINE | Facility: CLINIC | Age: 48
End: 2023-05-15
Payer: COMMERCIAL

## 2023-05-15 DIAGNOSIS — Z71.84 TRAVEL ADVICE ENCOUNTER: Primary | ICD-10-CM

## 2023-05-15 PROCEDURE — 99421 OL DIG E/M SVC 5-10 MIN: CPT | Performed by: FAMILY MEDICINE

## 2023-05-17 NOTE — PATIENT INSTRUCTIONS
Hi Leila, I think the safest option is for you guys to schedule a Travel Visit to ensure all vaccines and everything is up to date for your children/you. I can order a Hep B antibody test to ensure you are immune to that (and if not we can help order the Hep B vaccine series), and I will place the COVID swab so you can schedule that 72 hr prior to leaving as needed. Please bring the form with you at that lab visit so we can have myself or a covering provider help fill in the information for you if the doctor is required to sign it.    The [facility] are in [place] in [facility].  You can call [phone] or [phone] to schedule appointment with them.      You can also review the CDC website for travel to [facility] to start that review on your own first if you prefer as you may find your kids are already up to date with the required vaccines. However depending on areas you travel you may need special vaccines that are provided at the Travel Clinic which they can review for you.     Please schedule an appointment with the lab right here in [place], or call [phone].  I will let you know when the results are back and next steps to take.    Thanks,   Dr. Lin

## 2023-05-23 ENCOUNTER — MYC MEDICAL ADVICE (OUTPATIENT)
Dept: FAMILY MEDICINE | Facility: CLINIC | Age: 48
End: 2023-05-23

## 2023-05-23 DIAGNOSIS — Z91.030 ALLERGY TO HONEY BEE VENOM: Primary | ICD-10-CM

## 2023-05-24 RX ORDER — EPINEPHRINE 0.3 MG/.3ML
0.3 INJECTION SUBCUTANEOUS PRN
Qty: 2 EACH | Refills: 1 | Status: SHIPPED | OUTPATIENT
Start: 2023-05-24 | End: 2024-06-18

## 2023-05-26 ENCOUNTER — LAB (OUTPATIENT)
Dept: LAB | Facility: CLINIC | Age: 48
End: 2023-05-26
Payer: COMMERCIAL

## 2023-05-26 DIAGNOSIS — Z71.84 TRAVEL ADVICE ENCOUNTER: ICD-10-CM

## 2023-05-26 PROCEDURE — 86706 HEP B SURFACE ANTIBODY: CPT

## 2023-05-26 PROCEDURE — 36415 COLL VENOUS BLD VENIPUNCTURE: CPT

## 2023-05-27 LAB
HBV SURFACE AB SERPL IA-ACNC: 547.69 M[IU]/ML
HBV SURFACE AB SERPL IA-ACNC: REACTIVE M[IU]/ML

## 2023-06-01 ENCOUNTER — HEALTH MAINTENANCE LETTER (OUTPATIENT)
Age: 48
End: 2023-06-01

## 2023-06-06 ENCOUNTER — HOSPITAL ENCOUNTER (OUTPATIENT)
Dept: MAMMOGRAPHY | Facility: CLINIC | Age: 48
Discharge: HOME | End: 2023-06-06
Attending: FAMILY MEDICINE | Admitting: FAMILY MEDICINE
Payer: COMMERCIAL

## 2023-06-06 DIAGNOSIS — Z12.31 VISIT FOR SCREENING MAMMOGRAM: ICD-10-CM

## 2023-06-06 PROCEDURE — 77067 SCR MAMMO BI INCL CAD: CPT

## 2023-06-07 ENCOUNTER — VIRTUAL VISIT (OUTPATIENT)
Dept: FAMILY MEDICINE | Facility: CLINIC | Age: 48
End: 2023-06-07
Payer: COMMERCIAL

## 2023-06-07 DIAGNOSIS — F41.8 SITUATIONAL ANXIETY: Primary | ICD-10-CM

## 2023-06-07 DIAGNOSIS — B00.89 HERPETIC WHITLOW: ICD-10-CM

## 2023-06-07 DIAGNOSIS — F40.243 FEAR OF FLYING: ICD-10-CM

## 2023-06-07 PROCEDURE — 99214 OFFICE O/P EST MOD 30 MIN: CPT | Mod: VID | Performed by: FAMILY MEDICINE

## 2023-06-07 RX ORDER — LORAZEPAM 0.5 MG/1
.5-1 TABLET ORAL EVERY 6 HOURS PRN
Qty: 15 TABLET | Refills: 0 | Status: SHIPPED | OUTPATIENT
Start: 2023-06-07 | End: 2023-09-26

## 2023-06-07 ASSESSMENT — ANXIETY QUESTIONNAIRES
GAD7 TOTAL SCORE: 2
1. FEELING NERVOUS, ANXIOUS, OR ON EDGE: NOT AT ALL
IF YOU CHECKED OFF ANY PROBLEMS ON THIS QUESTIONNAIRE, HOW DIFFICULT HAVE THESE PROBLEMS MADE IT FOR YOU TO DO YOUR WORK, TAKE CARE OF THINGS AT HOME, OR GET ALONG WITH OTHER PEOPLE: NOT DIFFICULT AT ALL
7. FEELING AFRAID AS IF SOMETHING AWFUL MIGHT HAPPEN: NOT AT ALL
2. NOT BEING ABLE TO STOP OR CONTROL WORRYING: NOT AT ALL
6. BECOMING EASILY ANNOYED OR IRRITABLE: SEVERAL DAYS
4. TROUBLE RELAXING: NOT AT ALL
GAD7 TOTAL SCORE: 2
8. IF YOU CHECKED OFF ANY PROBLEMS, HOW DIFFICULT HAVE THESE MADE IT FOR YOU TO DO YOUR WORK, TAKE CARE OF THINGS AT HOME, OR GET ALONG WITH OTHER PEOPLE?: NOT DIFFICULT AT ALL
GAD7 TOTAL SCORE: 2
7. FEELING AFRAID AS IF SOMETHING AWFUL MIGHT HAPPEN: NOT AT ALL
3. WORRYING TOO MUCH ABOUT DIFFERENT THINGS: SEVERAL DAYS
5. BEING SO RESTLESS THAT IT IS HARD TO SIT STILL: NOT AT ALL

## 2023-06-07 ASSESSMENT — ENCOUNTER SYMPTOMS: NERVOUS/ANXIOUS: 1

## 2023-06-07 ASSESSMENT — PATIENT HEALTH QUESTIONNAIRE - PHQ9
10. IF YOU CHECKED OFF ANY PROBLEMS, HOW DIFFICULT HAVE THESE PROBLEMS MADE IT FOR YOU TO DO YOUR WORK, TAKE CARE OF THINGS AT HOME, OR GET ALONG WITH OTHER PEOPLE: NOT DIFFICULT AT ALL
SUM OF ALL RESPONSES TO PHQ QUESTIONS 1-9: 3
SUM OF ALL RESPONSES TO PHQ QUESTIONS 1-9: 3

## 2023-06-07 NOTE — PROGRESS NOTES
"Leila is a 48 year old who is being evaluated via a billable video visit.      How would you like to obtain your AVS? MyChart  If the video visit is dropped, the invitation should be resent by: Text to cell phone: [phone]  Will anyone else be joining your video visit? No    Assessment & Plan     Situational anxiety  Fear of flying  Reviewed option for prn agent for her long flight coming up to [facility]; side effect profile reviewed. She has tolerated valium in the past for pre-procedure/imaging but would prefer the shorter acting option instead and will send ativan.   - LORazepam (ATIVAN) 0.5 MG tablet; Take 1-2 tablets (0.5-1 mg) by mouth every 6 hours as needed for anxiety    Herpetic dee  Most likely dx based on hx of cold sores, clear fluid blisters on fingers around the same time as her cold sore.  Discussed option to try valtrex next time the symptoms bother her.          BMI:   Estimated body mass index is 27.19 kg/m  as calculated from the following:    Height as of 12/8/22: 1.6 m (5' 3\").    Weight as of 4/6/23: 69.6 kg (153 lb 8 oz).       Amanda Lin MD  [facility]   Leila is a 48 year old, presenting for the following health issues:  Anxiety (Regarding upcoming trip)    She is anxious regarding her upcoming trip to [facility].   Flight concerns: leaves end of July for a 13hr flight.   She has chronic back and neck pain ans these worsen with seated position. She has flexeril on hand for that.     She is worried about the anxiety she gets on plane flights. Wondering about a medication for this.     She finds propranolol helps for her social anxiety.    COVID tests no longer needed for her trip to [facility].   Her Hep B vaccine is UTD    In summer months she gets some blisters on her finger and it looks possibly like herpetic dee on exam- she just got over a cold sore and treated with valtrex and then noticed the symptoms on her hands. Discussed option " to try valtrex next time the symptoms bother her.           6/7/2023     8:58 AM   Additional Questions   Roomed by Michelle RODARTE LPN     Anxiety    History of Present Illness       Reason for visit:  Neck pain and muscle spasms during long lengths of time sitting. Thyroid follow up.    She eats 2-3 servings of fruits and vegetables daily.She consumes 1 sweetened beverage(s) daily.She exercises with enough effort to increase her heart rate 20 to 29 minutes per day.  She exercises with enough effort to increase her heart rate 5 days per week.   She is taking medications regularly.    Today's PHQ-9         PHQ-9 Total Score: 3    PHQ-9 Q9 Thoughts of better off dead/self-harm past 2 weeks :   Not at all    How difficult have these problems made it for you to do your work, take care of things at home, or get along with other people: Not difficult at all  Today's SAUL-7 Score: 2         Review of Systems   Psychiatric/Behavioral: The patient is nervous/anxious.           Objective           Vitals:  No vitals were obtained today due to virtual visit.    Physical Exam   GENERAL: healthy, alert and no distress- mildly anxious  EYES: Eyes grossly normal to inspection.  No discharge or erythema, or obvious scleral/conjunctival abnormalities.  RESP: No audible wheeze, cough, or visible cyanosis.  No visible retractions or increased work of breathing.    SKIN: Visible skin clear. No significant rash, abnormal pigmentation or lesions.  NEURO: Cranial nerves grossly intact.  Mentation and speech appropriate for age.  PSYCH: Mentation appears normal, affect normal/bright, judgement and insight intact, normal speech and appearance well-groomed.                Video-Visit Details    Type of service:  Video Visit     Originating Location (pt. Location): Home  Distant Location (provider location):  On-site  Platform used for Video Visit: GenSpera

## 2023-06-16 ENCOUNTER — ANCILLARY PROCEDURE (OUTPATIENT)
Dept: MAMMOGRAPHY | Facility: CLINIC | Age: 48
End: 2023-06-16
Attending: FAMILY MEDICINE
Payer: COMMERCIAL

## 2023-06-16 DIAGNOSIS — N64.89 BREAST ASYMMETRY: ICD-10-CM

## 2023-06-16 PROCEDURE — 77061 BREAST TOMOSYNTHESIS UNI: CPT | Mod: RT

## 2023-07-05 ENCOUNTER — MYC REFILL (OUTPATIENT)
Dept: FAMILY MEDICINE | Facility: CLINIC | Age: 48
End: 2023-07-05
Payer: COMMERCIAL

## 2023-07-05 DIAGNOSIS — F90.0 ATTENTION DEFICIT HYPERACTIVITY DISORDER (ADHD), PREDOMINANTLY INATTENTIVE TYPE: ICD-10-CM

## 2023-07-06 RX ORDER — DEXTROAMPHETAMINE SACCHARATE, AMPHETAMINE ASPARTATE MONOHYDRATE, DEXTROAMPHETAMINE SULFATE AND AMPHETAMINE SULFATE 5; 5; 5; 5 MG/1; MG/1; MG/1; MG/1
20 CAPSULE, EXTENDED RELEASE ORAL DAILY
Qty: 30 CAPSULE | Refills: 0 | Status: SHIPPED | OUTPATIENT
Start: 2023-07-06 | End: 2023-07-24

## 2023-07-06 RX ORDER — DEXTROAMPHETAMINE SACCHARATE, AMPHETAMINE ASPARTATE MONOHYDRATE, DEXTROAMPHETAMINE SULFATE AND AMPHETAMINE SULFATE 7.5; 7.5; 7.5; 7.5 MG/1; MG/1; MG/1; MG/1
30 CAPSULE, EXTENDED RELEASE ORAL DAILY
Qty: 30 CAPSULE | Refills: 0 | Status: SHIPPED | OUTPATIENT
Start: 2023-07-28 | End: 2023-07-19

## 2023-07-17 ENCOUNTER — TELEPHONE (OUTPATIENT)
Dept: FAMILY MEDICINE | Facility: CLINIC | Age: 48
End: 2023-07-17

## 2023-07-17 NOTE — TELEPHONE ENCOUNTER
Hello,    Pharm called verifying the increase on her ADHD med from 20-30 mg. I did see the note refill note and you stated one was for now and one future but did not see a comment on a increase. Also in the refill note you said the earliest fill date was 7/28 but the pt states she needs it 7/21 for traveling. Please let me know.    Thank you,    AR Silva

## 2023-07-18 ENCOUNTER — MYC MEDICAL ADVICE (OUTPATIENT)
Dept: FAMILY MEDICINE | Facility: CLINIC | Age: 48
End: 2023-07-18
Payer: COMMERCIAL

## 2023-07-18 DIAGNOSIS — F90.0 ATTENTION DEFICIT HYPERACTIVITY DISORDER (ADHD), PREDOMINANTLY INATTENTIVE TYPE: ICD-10-CM

## 2023-07-18 NOTE — TELEPHONE ENCOUNTER
Please cancel the 30mg Rx; I'm sorry for that and thank you for catching- should have been 20mg.     She can  the 20mg Rx on 7/21 if pharmacy is ok to take a verbal order? I sent that Rx on 7/6           Then please let me know if I need to still send the other

## 2023-07-19 NOTE — TELEPHONE ENCOUNTER
Called [facility] - Spoke with Hammad, pharmacist.    canceled the Adderall XR 30mg Rx     Per verbal order from Dr. Lin pt can  the 20mg Rx on 7/21.    Pharmacy has canceled 30 mg and will note patient can  20 mg on 7/21/2023.

## 2023-07-25 RX ORDER — DEXTROAMPHETAMINE SACCHARATE, AMPHETAMINE ASPARTATE MONOHYDRATE, DEXTROAMPHETAMINE SULFATE AND AMPHETAMINE SULFATE 5; 5; 5; 5 MG/1; MG/1; MG/1; MG/1
20 CAPSULE, EXTENDED RELEASE ORAL DAILY
Qty: 30 CAPSULE | Refills: 0 | Status: SHIPPED | OUTPATIENT
Start: 2023-07-25 | End: 2023-08-31

## 2023-08-31 ENCOUNTER — MYC REFILL (OUTPATIENT)
Dept: FAMILY MEDICINE | Facility: CLINIC | Age: 48
End: 2023-08-31
Payer: COMMERCIAL

## 2023-08-31 DIAGNOSIS — F90.0 ATTENTION DEFICIT HYPERACTIVITY DISORDER (ADHD), PREDOMINANTLY INATTENTIVE TYPE: ICD-10-CM

## 2023-09-01 DIAGNOSIS — F90.0 ATTENTION DEFICIT HYPERACTIVITY DISORDER (ADHD), PREDOMINANTLY INATTENTIVE TYPE: ICD-10-CM

## 2023-09-01 NOTE — TELEPHONE ENCOUNTER
Medication Question or Refill    What medication are you calling about (include dose and sig)?: Adderall XR 20 MG 24 hr capsule    Preferred Pharmacy:  [facility] [address] [place] [address] [address] [facility]  [address] [facility] [address]  Phone: [phone] Fax: [phone] Alternate Fax: [phone]          Controlled Substance Agreement on file:   CSA -- Patient Level:    CSA: None found at the patient level.       Who prescribed the medication?: Dr. Lin    Do you need a refill? Yes    Do you have any questions or concerns?  No      Could we send this information to you in [place] or would you prefer to receive a phone call?:   Patient would prefer a phone call   Okay to leave a detailed message?: Yes at Cell number on file:    Telephone Information:   Mobile [phone]

## 2023-09-05 ENCOUNTER — MYC REFILL (OUTPATIENT)
Dept: FAMILY MEDICINE | Facility: CLINIC | Age: 48
End: 2023-09-05
Payer: COMMERCIAL

## 2023-09-05 DIAGNOSIS — F90.0 ATTENTION DEFICIT HYPERACTIVITY DISORDER (ADHD), PREDOMINANTLY INATTENTIVE TYPE: ICD-10-CM

## 2023-09-05 RX ORDER — DEXTROAMPHETAMINE SACCHARATE, AMPHETAMINE ASPARTATE MONOHYDRATE, DEXTROAMPHETAMINE SULFATE AND AMPHETAMINE SULFATE 5; 5; 5; 5 MG/1; MG/1; MG/1; MG/1
20 CAPSULE, EXTENDED RELEASE ORAL DAILY
Qty: 30 CAPSULE | Refills: 0 | Status: SHIPPED | OUTPATIENT
Start: 2023-09-05 | End: 2023-09-26

## 2023-09-05 RX ORDER — DEXTROAMPHETAMINE SACCHARATE, AMPHETAMINE ASPARTATE MONOHYDRATE, DEXTROAMPHETAMINE SULFATE AND AMPHETAMINE SULFATE 5; 5; 5; 5 MG/1; MG/1; MG/1; MG/1
20 CAPSULE, EXTENDED RELEASE ORAL DAILY
Qty: 30 CAPSULE | Refills: 0 | OUTPATIENT
Start: 2023-09-05

## 2023-09-09 RX ORDER — DEXTROAMPHETAMINE SACCHARATE, AMPHETAMINE ASPARTATE MONOHYDRATE, DEXTROAMPHETAMINE SULFATE AND AMPHETAMINE SULFATE 5; 5; 5; 5 MG/1; MG/1; MG/1; MG/1
20 CAPSULE, EXTENDED RELEASE ORAL DAILY
Qty: 30 CAPSULE | Refills: 0 | OUTPATIENT
Start: 2023-09-09

## 2023-09-21 ASSESSMENT — ENCOUNTER SYMPTOMS
CONSTIPATION: 0
HEMATOCHEZIA: 0
DYSURIA: 0
FREQUENCY: 0
MYALGIAS: 1
PARESTHESIAS: 0
HEMATURIA: 0
JOINT SWELLING: 1
ABDOMINAL PAIN: 0
DIZZINESS: 0
ARTHRALGIAS: 1
CHILLS: 0
HEARTBURN: 0
NAUSEA: 0
COUGH: 0
EYE PAIN: 0
FEVER: 0
HEADACHES: 0
WEAKNESS: 0
SHORTNESS OF BREATH: 0
BREAST MASS: 0
SORE THROAT: 0
NERVOUS/ANXIOUS: 0
PALPITATIONS: 0
DIARRHEA: 0

## 2023-09-26 ENCOUNTER — OFFICE VISIT (OUTPATIENT)
Dept: FAMILY MEDICINE | Facility: CLINIC | Age: 48
End: 2023-09-26
Payer: COMMERCIAL

## 2023-09-26 VITALS
HEART RATE: 98 BPM | BODY MASS INDEX: 28.63 KG/M2 | WEIGHT: 161.6 LBS | OXYGEN SATURATION: 98 % | SYSTOLIC BLOOD PRESSURE: 122 MMHG | HEIGHT: 63 IN | DIASTOLIC BLOOD PRESSURE: 72 MMHG

## 2023-09-26 DIAGNOSIS — R79.89 ABNORMAL TSH: ICD-10-CM

## 2023-09-26 DIAGNOSIS — D72.829 LEUKOCYTOSIS, UNSPECIFIED TYPE: ICD-10-CM

## 2023-09-26 DIAGNOSIS — F90.0 ATTENTION DEFICIT HYPERACTIVITY DISORDER (ADHD), PREDOMINANTLY INATTENTIVE TYPE: ICD-10-CM

## 2023-09-26 DIAGNOSIS — R53.82 CHRONIC FATIGUE: ICD-10-CM

## 2023-09-26 DIAGNOSIS — Z00.00 ROUTINE GENERAL MEDICAL EXAMINATION AT A HEALTH CARE FACILITY: Primary | ICD-10-CM

## 2023-09-26 DIAGNOSIS — F51.4 NIGHT TERRORS: ICD-10-CM

## 2023-09-26 DIAGNOSIS — G25.81 RESTLESS LEGS SYNDROME: ICD-10-CM

## 2023-09-26 PROBLEM — M54.12 CERVICAL RADICULOPATHY: Status: RESOLVED | Noted: 2021-02-10 | Resolved: 2023-09-26

## 2023-09-26 PROBLEM — M48.02 CERVICAL STENOSIS OF SPINE: Status: RESOLVED | Noted: 2021-02-15 | Resolved: 2023-09-26

## 2023-09-26 PROBLEM — M48.02 CERVICAL STENOSIS OF SPINAL CANAL: Status: RESOLVED | Noted: 2021-02-15 | Resolved: 2023-09-26

## 2023-09-26 LAB
CHOLEST SERPL-MCNC: 187 MG/DL
ERYTHROCYTE [DISTWIDTH] IN BLOOD BY AUTOMATED COUNT: 12.5 % (ref 10–15)
FERRITIN SERPL-MCNC: 148 NG/ML (ref 6–175)
HBA1C MFR BLD: 5.6 % (ref 0–5.6)
HCT VFR BLD AUTO: 41.7 % (ref 35–47)
HDLC SERPL-MCNC: 81 MG/DL
HGB BLD-MCNC: 13.9 G/DL (ref 11.7–15.7)
LDLC SERPL CALC-MCNC: 87 MG/DL
MCH RBC QN AUTO: 30.5 PG (ref 26.5–33)
MCHC RBC AUTO-ENTMCNC: 33.3 G/DL (ref 31.5–36.5)
MCV RBC AUTO: 91 FL (ref 78–100)
NONHDLC SERPL-MCNC: 106 MG/DL
PLATELET # BLD AUTO: 397 10E3/UL (ref 150–450)
RBC # BLD AUTO: 4.56 10E6/UL (ref 3.8–5.2)
T4 FREE SERPL-MCNC: 1.04 NG/DL (ref 0.9–1.7)
TRIGL SERPL-MCNC: 94 MG/DL
TSH SERPL DL<=0.005 MIU/L-ACNC: 0.26 UIU/ML (ref 0.3–4.2)
VIT D+METAB SERPL-MCNC: 26 NG/ML (ref 20–50)
WBC # BLD AUTO: 11.3 10E3/UL (ref 4–11)

## 2023-09-26 PROCEDURE — 82306 VITAMIN D 25 HYDROXY: CPT | Performed by: FAMILY MEDICINE

## 2023-09-26 PROCEDURE — 83036 HEMOGLOBIN GLYCOSYLATED A1C: CPT | Performed by: FAMILY MEDICINE

## 2023-09-26 PROCEDURE — 84439 ASSAY OF FREE THYROXINE: CPT | Performed by: FAMILY MEDICINE

## 2023-09-26 PROCEDURE — 84443 ASSAY THYROID STIM HORMONE: CPT | Performed by: FAMILY MEDICINE

## 2023-09-26 PROCEDURE — 36415 COLL VENOUS BLD VENIPUNCTURE: CPT | Performed by: FAMILY MEDICINE

## 2023-09-26 PROCEDURE — 99214 OFFICE O/P EST MOD 30 MIN: CPT | Mod: 25 | Performed by: FAMILY MEDICINE

## 2023-09-26 PROCEDURE — 90686 IIV4 VACC NO PRSV 0.5 ML IM: CPT | Performed by: FAMILY MEDICINE

## 2023-09-26 PROCEDURE — 85027 COMPLETE CBC AUTOMATED: CPT | Performed by: FAMILY MEDICINE

## 2023-09-26 PROCEDURE — 99396 PREV VISIT EST AGE 40-64: CPT | Mod: 25 | Performed by: FAMILY MEDICINE

## 2023-09-26 PROCEDURE — 80061 LIPID PANEL: CPT | Performed by: FAMILY MEDICINE

## 2023-09-26 PROCEDURE — 82728 ASSAY OF FERRITIN: CPT | Performed by: FAMILY MEDICINE

## 2023-09-26 PROCEDURE — 90471 IMMUNIZATION ADMIN: CPT | Performed by: FAMILY MEDICINE

## 2023-09-26 RX ORDER — DEXTROAMPHETAMINE SACCHARATE, AMPHETAMINE ASPARTATE MONOHYDRATE, DEXTROAMPHETAMINE SULFATE AND AMPHETAMINE SULFATE 5; 5; 5; 5 MG/1; MG/1; MG/1; MG/1
20 CAPSULE, EXTENDED RELEASE ORAL DAILY
Qty: 30 CAPSULE | Refills: 0 | Status: SHIPPED | OUTPATIENT
Start: 2023-09-26 | End: 2023-10-23

## 2023-09-26 RX ORDER — GABAPENTIN 100 MG/1
100-300 CAPSULE ORAL AT BEDTIME
Qty: 90 CAPSULE | Refills: 1 | Status: SHIPPED | OUTPATIENT
Start: 2023-09-26 | End: 2023-12-19

## 2023-09-26 ASSESSMENT — ENCOUNTER SYMPTOMS
PARESTHESIAS: 0
NERVOUS/ANXIOUS: 0
ABDOMINAL PAIN: 0
HEARTBURN: 0
FREQUENCY: 0
EYE PAIN: 0
NAUSEA: 0
COUGH: 0
HEMATURIA: 0
DIARRHEA: 0
CONSTIPATION: 0
DIZZINESS: 0
DYSURIA: 0
WEAKNESS: 0
HEADACHES: 0
ARTHRALGIAS: 1
MYALGIAS: 1
SHORTNESS OF BREATH: 0
HEMATOCHEZIA: 0
PALPITATIONS: 0
SORE THROAT: 0
FEVER: 0
CHILLS: 0
JOINT SWELLING: 1
BREAST MASS: 0

## 2023-09-26 ASSESSMENT — ANXIETY QUESTIONNAIRES
1. FEELING NERVOUS, ANXIOUS, OR ON EDGE: NOT AT ALL
IF YOU CHECKED OFF ANY PROBLEMS ON THIS QUESTIONNAIRE, HOW DIFFICULT HAVE THESE PROBLEMS MADE IT FOR YOU TO DO YOUR WORK, TAKE CARE OF THINGS AT HOME, OR GET ALONG WITH OTHER PEOPLE: NOT DIFFICULT AT ALL
6. BECOMING EASILY ANNOYED OR IRRITABLE: NOT AT ALL
4. TROUBLE RELAXING: NOT AT ALL
7. FEELING AFRAID AS IF SOMETHING AWFUL MIGHT HAPPEN: NOT AT ALL
2. NOT BEING ABLE TO STOP OR CONTROL WORRYING: NOT AT ALL
3. WORRYING TOO MUCH ABOUT DIFFERENT THINGS: NOT AT ALL
5. BEING SO RESTLESS THAT IT IS HARD TO SIT STILL: NOT AT ALL
GAD7 TOTAL SCORE: 0
GAD7 TOTAL SCORE: 0

## 2023-09-26 ASSESSMENT — PATIENT HEALTH QUESTIONNAIRE - PHQ9
SUM OF ALL RESPONSES TO PHQ QUESTIONS 1-9: 3
SUM OF ALL RESPONSES TO PHQ QUESTIONS 1-9: 3
10. IF YOU CHECKED OFF ANY PROBLEMS, HOW DIFFICULT HAVE THESE PROBLEMS MADE IT FOR YOU TO DO YOUR WORK, TAKE CARE OF THINGS AT HOME, OR GET ALONG WITH OTHER PEOPLE: NOT DIFFICULT AT ALL

## 2023-09-26 NOTE — PROGRESS NOTES
SUBJECTIVE:   CC: Leila is an 48 year old who presents for preventive health visit.       6/7/2023     8:58 AM   Additional Questions   Roomed by Michelle RODARTE LPN       Healthy Habits:     Getting at least 3 servings of Calcium per day:  Yes    Bi-annual eye exam:  Yes    Dental care twice a year:  Yes    Sleep apnea or symptoms of sleep apnea:  None    Diet:  Regular (no restrictions)    Frequency of exercise:  4-5 days/week    Duration of exercise:  30-45 minutes    Taking medications regularly:  Yes    Medication side effects:  None    Additional concerns today:  No    Chief Complaint   Patient presents with     Physical     Not fasting     Fatigue     Having trouble sleeping - kids tell her that she looks and sounds like she is having night terrors? and dealing with constant fatigue, wondering about vitamins       ADHD: has been doing well on Adderall 20mg daily.  reviewed, last filled 9/6/2023; and prior to that 7/28/23    She had issues with the early fill for her travel to [facility].   Ativan helped with her flight anxiety too.     Speech anxiety: propranolol really helpful prior to presentations/social events. Takes 20mg -40mg as needed for that.    Fatigue: She has been told that she tosses and turns at night. (Kids and coworkers on business trips together sharing sleeping spaces)  She is reported to be thrashing around, restless legs, ? Hx of longstanding Night terrors because she states she makes noises like someone might be harming her- she states her son even in his youth would report hearing her do these things. Not getting a sense of desire to move legs.  Has fallen asleep well. Doesn't fall asleep feeling anxious.   Tried brain dumps/journaling before bed.  She doesn't snore, no apnea    Using flexeril PRN for muscle spasms    Hx cold sores; valtrex on file for flares    Starting to notice more hot flashes lately in the past few months. Worse in [place] on her recent travels as baseline temps there were  also hot outside this summer.     She is s/p hysterectomy; does still have ovaries.       Bx 3/2022 showed Benign colloid follicle at mid lower left thyroid   Clinical follow up  No symptoms of concern at this time.    Social History     Social History Narrative    Lives with her dog. She is a single parent.   Daughter Khadra 22yo and her 19yo son just is off to college.   Works in cyber security.   Also coaches gymnastics in the evenings.   Walks 5lb [employer] dog for  exercise. Otherwise walks or runs 3-5 miles a couple times per week. Former smoker. No alcohol.   Amanda Lin MD         Today's PHQ-9 Score:       2023     3:05 PM   PHQ-9 SCORE   PHQ-9 Total Score MyChart 3 (Minimal depression)   PHQ-9 Total Score 3         Have you ever done Advance Care Planning? (For example, a Health Directive, POLST, or a discussion with a medical provider or your loved ones about your wishes): No, advance care planning information given to patient to review.  Patient plans to discuss their wishes with loved ones or provider.      Social History     Tobacco Use     Smoking status: Former     Types: Cigarettes     Quit date: 2018     Years since quittin.6     Smokeless tobacco: Never   Substance Use Topics     Alcohol use: No             2023    11:05 AM   Alcohol Use   Prescreen: >3 drinks/day or >7 drinks/week? No          No data to display              Reviewed orders with patient.  Reviewed health maintenance and updated orders accordingly - Yes      Breast Cancer Screening:    FHS-7:       2022     3:59 PM 2023     5:16 PM 2023     2:35 PM 2023    11:07 AM   Breast CA Risk Assessment (FHS-7)   Did any of your first-degree relatives have breast or ovarian cancer? No No No No   Did any of your relatives have bilateral breast cancer? No No No No   Did any man in your family have breast cancer? No No No No   Did any woman in your family have breast and ovarian cancer? No No No No  "  Did any woman in your family have breast cancer before age 50 y? No No No No   Do you have 2 or more relatives with breast and/or ovarian cancer? No No No No   Do you have 2 or more relatives with breast and/or bowel cancer? No No No No       Pertinent mammograms are reviewed under the imaging tab.    History of abnormal Pap smear: Status post benign hysterectomy. Health Maintenance and Surgical History updated.     Reviewed and updated as needed this visit by clinical staff   Tobacco  Allergies  Meds              Reviewed and updated as needed this visit by Provider                     Review of Systems   Constitutional:  Negative for chills and fever.   HENT:  Negative for congestion, ear pain, hearing loss and sore throat.    Eyes:  Negative for pain and visual disturbance.   Respiratory:  Negative for cough and shortness of breath.    Cardiovascular:  Positive for chest pain. Negative for palpitations and peripheral edema.   Gastrointestinal:  Negative for abdominal pain, constipation, diarrhea, heartburn, hematochezia and nausea.   Breasts:  Negative for tenderness, breast mass and discharge.   Genitourinary:  Negative for dysuria, frequency, genital sores, hematuria, pelvic pain, urgency, vaginal bleeding and vaginal discharge.   Musculoskeletal:  Positive for arthralgias, joint swelling and myalgias.   Skin:  Negative for rash.   Neurological:  Negative for dizziness, weakness, headaches and paresthesias.   Psychiatric/Behavioral:  Negative for mood changes. The patient is not nervous/anxious.         OBJECTIVE:   /72 (BP Location: Left arm, Patient Position: Sitting, Cuff Size: Adult Regular)   Pulse 98   Ht 1.596 m (5' 2.84\")   Wt 73.3 kg (161 lb 9.6 oz)   SpO2 98%   BMI 28.78 kg/m    Physical Exam  GENERAL: healthy, alert and no distress  EYES: Eyes grossly normal to inspection, PERRL and conjunctivae and sclerae normal  HENT: ear canals and TM's normal, nose and mouth without ulcers or " lesions  NECK: no adenopathy, no asymmetry, masses, or scars and thyroid normal to palpation  RESP: lungs clear to auscultation - no rales, rhonchi or wheezes  BREAST: declined  CV: regular rate and rhythm, normal S1 S2, no S3 or S4, no murmur, click or rub, no peripheral edema and peripheral pulses strong  ABDOMEN: soft, nontender, no hepatosplenomegaly, no masses and bowel sounds normal  MS: no gross musculoskeletal defects noted, no edema  SKIN: no suspicious lesions or rashes  NEURO: Normal strength and tone, mentation intact and speech normal  PSYCH: mentation appears normal, affect normal/bright    Diagnostic Test Results:  Labs reviewed in Epic    ASSESSMENT/PLAN:   Nora was seen today for physical and fatigue.    Diagnoses and all orders for this visit:    Routine general medical examination at a health care facility    -     Hemoglobin A1c  -     Lipid panel reflex to direct LDL Non-fasting  -     TSH with free T4 reflex    Chronic fatigue  Restless legs syndrome  Night terrors  Concern for chronic sleep disordered behaviors (ddx includes restless legs vs sleep terrors etc). She was encouraged to consider home sleep study (e consult to sleep medicine) which she declines at this time but would be open to it pending trial of low dose gabapentin. In the past she did not sit well with 300-900mg gabapentin range for neck pain indication during daytime (LH/dizziness) but we reviewed can be used for suspected restless legs at lower dose 100-300mg range and titrated accordingly if tolerating for bedtime dosing. We will check labs today as well.  Encouraged referral for cognitive behavioral therapy for insomnia (CBT-I). CBT-I addresses the underlying feelings and behaviors that contribute to sleep problems using one or more cognitive therapy strategies with sleep education. Most effective in patients motivated to make behavioral changes and when insomnia persists despite treatment of contributing psychological and  "medical conditions. May be completed with a trained professional (referral to sleep medicine or CBT-I) and/or at home by following an online program or self-help workbook. At-home CBT-I programs: \"Say Luis Miguel to Insomnia\" (by Dr. Elan Schmidt) or [facility]'s 6-week course, \"GO! To Sleep.\"     -     Adult Mental Health  Referral; Future  -     gabapentin (NEURONTIN) 100 MG capsule; Take 1-3 capsules (100-300 mg) by mouth At Bedtime  -     Ferritin  -     Vitamin D Deficiency  -     CBC with platelets    Attention deficit hyperactivity disorder (ADHD), predominantly inattentive type  Stable,  reviewed, no concerns. Ok to refill Rx at this tie. Follow up in 3 months  - CSA was signed about 1 year ago 8/15/2022 and I will have her update this next visit  -     amphetamine-dextroamphetamine (ADDERALL XR) 20 MG 24 hr capsule; Take 1 capsule (20 mg) by mouth daily    Other orders  -     INFLUENZA VACCINE IM > 6 MONTHS VALENT IIV4 (AFLURIA/FLUZONE)        Patient has been advised of split billing requirements and indicates understanding: Yes      COUNSELING:  Reviewed preventive health counseling, as reflected in patient instructions      BMI:   Estimated body mass index is 28.78 kg/m  as calculated from the following:    Height as of this encounter: 1.596 m (5' 2.84\").    Weight as of this encounter: 73.3 kg (161 lb 9.6 oz).       She reports that she quit smoking about 5 years ago. Her smoking use included cigarettes. She has never used smokeless tobacco.          Amanda Lin MD  [facility] CenterAnswers submitted by the patient for this visit:  Patient Health Questionnaire (Submitted on 9/26/2023)  If you checked off any problems, how difficult have these problems made it for you to do your work, take care of things at home, or get along with other people?: Not difficult at all  PHQ9 TOTAL SCORE: 3  SAUL-7 (Submitted on 9/26/2023)  SAUL 7 TOTAL SCORE: 0    "

## 2023-09-26 NOTE — PATIENT INSTRUCTIONS
"Encouraged referral for cognitive behavioral therapy for insomnia (CBT-I). CBT-I addresses the underlying feelings and behaviors that contribute to sleep problems using one or more cognitive therapy strategies with sleep education. Most effective in patients motivated to make behavioral changes and when insomnia persists despite treatment of contributing psychological and medical conditions. May be completed with a trained professional (referral to sleep medicine or CBT-I) and/or at home by following an online program or self-help workbook. At-home CBT-I programs: \"Say Luis Miguel to Insomnia\" (by Dr. Elan Schmidt) or [facility]'s 6-week course, \"GO! To Sleep.\"     "

## 2023-10-10 ENCOUNTER — MYC MEDICAL ADVICE (OUTPATIENT)
Dept: FAMILY MEDICINE | Facility: CLINIC | Age: 48
End: 2023-10-10
Payer: COMMERCIAL

## 2023-10-10 DIAGNOSIS — F90.0 ATTENTION DEFICIT HYPERACTIVITY DISORDER (ADHD), PREDOMINANTLY INATTENTIVE TYPE: ICD-10-CM

## 2023-10-23 RX ORDER — DEXTROAMPHETAMINE SACCHARATE, AMPHETAMINE ASPARTATE MONOHYDRATE, DEXTROAMPHETAMINE SULFATE AND AMPHETAMINE SULFATE 5; 5; 5; 5 MG/1; MG/1; MG/1; MG/1
20 CAPSULE, EXTENDED RELEASE ORAL DAILY
Qty: 30 CAPSULE | Refills: 0 | Status: SHIPPED | OUTPATIENT
Start: 2023-10-27 | End: 2023-11-20

## 2023-11-20 ENCOUNTER — MYC REFILL (OUTPATIENT)
Dept: FAMILY MEDICINE | Facility: CLINIC | Age: 48
End: 2023-11-20
Payer: COMMERCIAL

## 2023-11-20 ENCOUNTER — MYC REFILL (OUTPATIENT)
Dept: SURGERY | Facility: CLINIC | Age: 48
End: 2023-11-20
Payer: COMMERCIAL

## 2023-11-20 DIAGNOSIS — Z86.19 HISTORY OF COLD SORES: ICD-10-CM

## 2023-11-20 DIAGNOSIS — M79.18 MYOFASCIAL PAIN: ICD-10-CM

## 2023-11-20 DIAGNOSIS — F40.10 SOCIAL ANXIETY DISORDER: ICD-10-CM

## 2023-11-20 DIAGNOSIS — F40.10 SOCIAL PHOBIA INVOLVING FEAR OF PUBLIC SPEAKING: ICD-10-CM

## 2023-11-20 DIAGNOSIS — F90.0 ATTENTION DEFICIT HYPERACTIVITY DISORDER (ADHD), PREDOMINANTLY INATTENTIVE TYPE: ICD-10-CM

## 2023-11-20 RX ORDER — DEXTROAMPHETAMINE SACCHARATE, AMPHETAMINE ASPARTATE MONOHYDRATE, DEXTROAMPHETAMINE SULFATE AND AMPHETAMINE SULFATE 5; 5; 5; 5 MG/1; MG/1; MG/1; MG/1
20 CAPSULE, EXTENDED RELEASE ORAL DAILY
Qty: 30 CAPSULE | Refills: 0 | Status: SHIPPED | OUTPATIENT
Start: 2023-11-20 | End: 2023-12-19

## 2023-11-20 RX ORDER — VALACYCLOVIR HYDROCHLORIDE 500 MG/1
TABLET, FILM COATED ORAL
Qty: 60 TABLET | Refills: 11 | Status: SHIPPED | OUTPATIENT
Start: 2023-11-20 | End: 2024-06-18

## 2023-11-20 RX ORDER — PROPRANOLOL HYDROCHLORIDE 20 MG/1
20-40 TABLET ORAL 2 TIMES DAILY PRN
Qty: 30 TABLET | Refills: 1 | Status: SHIPPED | OUTPATIENT
Start: 2023-11-20 | End: 2024-06-18

## 2023-11-20 RX ORDER — CYCLOBENZAPRINE HCL 10 MG
10 TABLET ORAL
Qty: 30 TABLET | Refills: 3 | Status: SHIPPED | OUTPATIENT
Start: 2023-11-20 | End: 2024-06-18

## 2023-12-13 ENCOUNTER — LAB (OUTPATIENT)
Dept: LAB | Facility: CLINIC | Age: 48
End: 2023-12-13
Payer: COMMERCIAL

## 2023-12-13 DIAGNOSIS — R53.82 CHRONIC FATIGUE: ICD-10-CM

## 2023-12-13 DIAGNOSIS — R79.89 ABNORMAL TSH: ICD-10-CM

## 2023-12-13 DIAGNOSIS — Z00.00 ROUTINE GENERAL MEDICAL EXAMINATION AT A HEALTH CARE FACILITY: ICD-10-CM

## 2023-12-13 DIAGNOSIS — D72.829 LEUKOCYTOSIS, UNSPECIFIED TYPE: ICD-10-CM

## 2023-12-13 LAB
ERYTHROCYTE [DISTWIDTH] IN BLOOD BY AUTOMATED COUNT: 12.8 % (ref 10–15)
HCT VFR BLD AUTO: 41.4 % (ref 35–47)
HGB BLD-MCNC: 13.8 G/DL (ref 11.7–15.7)
MCH RBC QN AUTO: 30 PG (ref 26.5–33)
MCHC RBC AUTO-ENTMCNC: 33.3 G/DL (ref 31.5–36.5)
MCV RBC AUTO: 90 FL (ref 78–100)
PLATELET # BLD AUTO: 384 10E3/UL (ref 150–450)
RBC # BLD AUTO: 4.6 10E6/UL (ref 3.8–5.2)
TSH SERPL DL<=0.005 MIU/L-ACNC: 0.3 UIU/ML (ref 0.3–4.2)
WBC # BLD AUTO: 7 10E3/UL (ref 4–11)

## 2023-12-13 PROCEDURE — 84443 ASSAY THYROID STIM HORMONE: CPT

## 2023-12-13 PROCEDURE — 85027 COMPLETE CBC AUTOMATED: CPT

## 2023-12-13 PROCEDURE — 36415 COLL VENOUS BLD VENIPUNCTURE: CPT

## 2023-12-19 ENCOUNTER — VIRTUAL VISIT (OUTPATIENT)
Dept: FAMILY MEDICINE | Facility: CLINIC | Age: 48
End: 2023-12-19
Payer: COMMERCIAL

## 2023-12-19 DIAGNOSIS — R53.82 CHRONIC FATIGUE: ICD-10-CM

## 2023-12-19 DIAGNOSIS — L65.9 HAIR LOSS: ICD-10-CM

## 2023-12-19 DIAGNOSIS — Z82.49 FAMILY HISTORY OF ISCHEMIC HEART DISEASE: ICD-10-CM

## 2023-12-19 DIAGNOSIS — F90.0 ATTENTION DEFICIT HYPERACTIVITY DISORDER (ADHD), PREDOMINANTLY INATTENTIVE TYPE: Primary | ICD-10-CM

## 2023-12-19 DIAGNOSIS — F51.4 NIGHT TERRORS: ICD-10-CM

## 2023-12-19 DIAGNOSIS — G25.81 RESTLESS LEGS SYNDROME: ICD-10-CM

## 2023-12-19 PROCEDURE — 99214 OFFICE O/P EST MOD 30 MIN: CPT | Mod: VID | Performed by: FAMILY MEDICINE

## 2023-12-19 RX ORDER — GABAPENTIN 100 MG/1
100-300 CAPSULE ORAL AT BEDTIME
Qty: 90 CAPSULE | Refills: 1 | Status: SHIPPED | OUTPATIENT
Start: 2023-12-19 | End: 2024-03-12

## 2023-12-19 RX ORDER — DEXTROAMPHETAMINE SACCHARATE, AMPHETAMINE ASPARTATE MONOHYDRATE, DEXTROAMPHETAMINE SULFATE AND AMPHETAMINE SULFATE 5; 5; 5; 5 MG/1; MG/1; MG/1; MG/1
20 CAPSULE, EXTENDED RELEASE ORAL DAILY
Qty: 30 CAPSULE | Refills: 0 | Status: SHIPPED | OUTPATIENT
Start: 2024-01-19 | End: 2024-02-18

## 2023-12-19 RX ORDER — DEXTROAMPHETAMINE SACCHARATE, AMPHETAMINE ASPARTATE MONOHYDRATE, DEXTROAMPHETAMINE SULFATE AND AMPHETAMINE SULFATE 5; 5; 5; 5 MG/1; MG/1; MG/1; MG/1
20 CAPSULE, EXTENDED RELEASE ORAL DAILY
Qty: 30 CAPSULE | Refills: 0 | Status: SHIPPED | OUTPATIENT
Start: 2024-02-19 | End: 2024-03-12

## 2023-12-19 RX ORDER — DEXTROAMPHETAMINE SACCHARATE, AMPHETAMINE ASPARTATE MONOHYDRATE, DEXTROAMPHETAMINE SULFATE AND AMPHETAMINE SULFATE 5; 5; 5; 5 MG/1; MG/1; MG/1; MG/1
20 CAPSULE, EXTENDED RELEASE ORAL DAILY
Qty: 30 CAPSULE | Refills: 0 | Status: CANCELLED | OUTPATIENT
Start: 2023-12-19

## 2023-12-19 RX ORDER — DEXTROAMPHETAMINE SACCHARATE, AMPHETAMINE ASPARTATE MONOHYDRATE, DEXTROAMPHETAMINE SULFATE AND AMPHETAMINE SULFATE 5; 5; 5; 5 MG/1; MG/1; MG/1; MG/1
20 CAPSULE, EXTENDED RELEASE ORAL DAILY
Qty: 30 CAPSULE | Refills: 0 | Status: SHIPPED | OUTPATIENT
Start: 2023-12-19 | End: 2024-01-18

## 2023-12-19 NOTE — PROGRESS NOTES
"Leila is a 48 year old who is being evaluated via a billable video visit.      How would you like to obtain your AVS? [employer]  If the video visit is dropped, the invitation should be resent by: Text to cell phone: [phone]  Will anyone else be joining your video visit? No          Assessment & Plan     Attention deficit hyperactivity disorder (ADHD), predominantly Stable,  reviewed, no concerns. Last filled 11/25/23.   - Follow up in 3 months with evisit or vv  - CSA will be updated via [employer] for pt to sign.   - 3 month fills sent:   - amphetamine-dextroamphetamine (ADDERALL XR) 20 MG 24 hr capsule; Take 1 capsule (20 mg) by mouth daily for 30 days  - amphetamine-dextroamphetamine (ADDERALL XR) 20 MG 24 hr capsule; Take 1 capsule (20 mg) by mouth daily for 30 days  - amphetamine-dextroamphetamine (ADDERALL XR) 20 MG 24 hr capsule; Take 1 capsule (20 mg) by mouth daily for 30 days    Chronic fatigue  Restless legs syndrome  Night terrors  Stable, refilled  - gabapentin (NEURONTIN) 100 MG capsule; Take 1-3 capsules (100-300 mg) by mouth at bedtime    Hair loss  Family hx hair loss; would like to est with dermatologist; ferritin 148 in Sept; TSH 0.30 12/13/23  - Adult Dermatology  Referral; Future    Family history of ischemic heart disease  ASCVD 0.5%; open to considering a statin if needed though HDL 81 and LDL 87; well controlled.   - CT Coronary Calcium Scan; Future             BMI:   Estimated body mass index is 28.78 kg/m  as calculated from the following:    Height as of 9/26/23: 1.596 m (5' 2.84\").    Weight as of 9/26/23: 73.3 kg (161 lb 9.6 oz).           Amanda Lin MD  [facility]    Subjective   Leila is a 48 year old, presenting for the following health issues:  Recheck Medication (Follow up for adderall- labs have been redrawn to discuss. Would like to discuss getting CT calcium score due to Fhx of heart disease.) and Hair Loss (Would like " referral to dermatology)        12/19/2023    10:16 AM   Additional Questions   Roomed by Michelle RODARTE LPN       ADHD: has been doing well on Adderall 20mg daily.  reviewed   Busy season at work. Finds it very helpful for her work to be on this medication. Possibly could think about a dose increase but also understands goal to not increase further. She is going to think about taking drug holidays on the weekends.     Speech anxiety: propranolol really helpful prior to presentations/social events. Takes 20mg -40mg as needed for that.     Reviewed the recheck labs on 12/13- the WBC normalized from Sept labs; the TSH normalized from 0.26 to 0.30.    Her sister is 3yr younger than Leila and had a CT calcium score which showed some concerns for elevated score; her mom then got one and showed some calcium buildup as well.    Heart disease does run in the family; recent massive MI unexpected in a healthy cousin age 54yo male.   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    Hair loss; has tried different OTC products, vitamins.                 Objective           Vitals:  No vitals were obtained today due to virtual visit.    GENERAL: Healthy, fatigued and no distress  EYES: Eyes grossly normal to inspection.  No discharge or erythema, or obvious scleral/conjunctival abnormalities.  RESP: No audible wheeze, cough, or visible cyanosis.  No visible retractions or increased work of breathing.    SKIN: Visible skin clear. No significant rash, abnormal pigmentation or lesions.  NEURO: Cranial nerves grossly intact.  Mentation and speech appropriate for age.  PSYCH: Mentation appears normal, affect normal/bright, judgement and insight intact, normal speech and appearance well-groomed.                The 10-year ASCVD risk score (Herbert DK, et al., 2019) is: 0.5%    Values used to calculate the score:      Age: 48 years      Sex: Female      Is Non- : No      Diabetic: No      Tobacco  smoker: No      Systolic Blood Pressure: 122 mmHg      Is BP treated: No      HDL Cholesterol: 81 mg/dL      Total Cholesterol: 187 mg/dL      Video-Visit Details    Type of service:  Video Visit     Originating Location (pt. Location): Home    Distant Location (provider location):  On-site  Platform used for Video Visit: Michael

## 2023-12-19 NOTE — LETTER
[facility]  12/19/23  Patient: Nora Jay  YOB: 1975  Medical Record Number: [id number]                                                                                  Non-Opioid Controlled Substance Agreement  Adderall XR 20mg daily # 30 per month  [facility]  Follow up q 3 months with evisit or virtual/physical    This is an agreement between you and your provider regarding safe and appropriate use of controlled substances prescribed by your care team. Controlled substances are?medicines that can cause physical and mental dependence (abuse).     There are strict laws about having and using these medicines. We here at [facility] are  committed to working with you in your efforts to get better. To support you in this work, we'll help you schedule regular office appointments for medicine refills. If we must cancel or change your appointment for any reason, we'll make sure you have enough medicine to last until your next appointment.     As a Provider, I will:   Listen carefully to your concerns while treating you with respect.   Recommend a treatment plan that I believe is in your best interest and may involve therapies other than medicine.    Talk with you often about the possible benefits and the risk of harm of any medicine that we prescribe for you.  Assess the safety of this medicine and check how well it works.    Provide a plan on how to taper (discontinue or go off) using this medicine if the decision is made to stop its use.      ::  As a Patient, I understand controlled substances:     Are prescribed by my care provider to help me function or work and manage my condition(s).?  Are strong medicines and can cause serious side effects.     Need to be taken exactly as prescribed.?Combining controlled substances with certain medicines or chemicals (such as illegal drugs, alcohol, sedatives, sleeping pills, and benzodiazepines) can be dangerous or even  fatal.? If I stop taking my medicines suddenly, I may have severe withdrawal symptoms.     The risks, benefits, and side effects of these medicine(s) were explained to me. I agree that:    I will take part in other treatments as advised by my care team. This may be psychiatry or counseling, physical therapy, behavioral therapy, group treatment or a referral to specialist.    I will keep all my appointments and understand this is part of the monitoring of controlled substances.?My care team may require an office visit for EVERY controlled substance refill. If I miss appointments or don t follow instructions, my care team may stop my medicine    I will take my medicines as prescribed. I will not change the dose or schedule unless my care team tells me to. There will be no refills if I run out early.      I may be asked to come to the clinic and complete a urine drug test or complete a pill count. If I don t give a urine sample or participate in a pill count, the care team may stop my medicine.    I will only receive controlled substance prescriptions from this clinic. If I am treated by another provider, I will tell them that I am taking controlled substances and that I have a treatment agreement with this provider. I will inform my [facility] care team within one business day if I am given a prescription for any controlled substance by another healthcare provider. My [facility] care team can contact other providers and pharmacists about my use of any medicines.    It is up to me to make sure that I don't run out of my medicines on weekends or holidays.?If my care team is willing to refill my prescription without a visit, I must request refills only during office hours. Refills may take up to 3 business days to process. I will use one pharmacy to fill all my controlled substance prescriptions. I will notify the clinic about any changes to my insurance or medicine availability.    I am responsible for my  prescriptions. If the medicine/prescription is lost, stolen or destroyed, it will not be replaced.?I also agree not to share controlled substance medicines with anyone.     I am aware I should not use any illegal or recreational drugs. I agree not to drink alcohol unless my care team says I can.     If I enroll in the [place] Medical Cannabis program, I will tell my care team before my next refill.    I will tell my care team right away if I become pregnant, have a new medical problem treated outside of my regular clinic, or have a change in my medicines.     I understand that this medicine can affect my thinking, judgment and reaction time.? Alcohol and drugs affect the brain and body, which can affect the safety of my driving. Being under the influence of alcohol or drugs can affect my decision-making, behaviors, personal safety and the safety of others. Driving while impaired (DWI) can occur if a person is driving, operating or in physical control of a car, motorcycle, boat, snowmobile, ATV, motorbike, off-road vehicle or any other motor vehicle ([place] Statute 169A.20). I understand the risk if I choose to drive or operate any vehicle or machinery.    I understand that if I do not follow any of the conditions above, my prescriptions or treatment may be stopped or changed.   I agree that my provider, clinic care team and pharmacy may work with any city, state or federal law enforcement agency that investigates the misuse, sale or other diversion of my controlled medicine. I will allow my provider to discuss my care with, or share a copy of, this agreement with any other treating provider, pharmacy or emergency room where I receive care.     I have read this agreement and have asked questions about anything I did not understand.    ________________________________________________________  Patient Signature - Nora VELAZQUEZ Pierre     ___________________                   Date     Amanda Lin  MD_____________________________________  Provider Signature - Amanda Cynthia Mckinneyen, MD       ______12/19/23______                   Date     ________________________________________________________  Witness Signature (required if provider not present while patient signing)          ___________________                   Date

## 2023-12-19 NOTE — LETTER
[facility]  12/19/23  Patient: Nora Jay  YOB: 1975  Medical Record Number: [id number]                                                                                  Non-Opioid Controlled Substance Agreement  Adderall XR 20mg daily # 30 per month  [facility] (formerly [facility]3]  Follow up q 3 months with evisit or virtual/physical    This is an agreement between you and your provider regarding safe and appropriate use of controlled substances prescribed by your care team. Controlled substances are?medicines that can cause physical and mental dependence (abuse).     There are strict laws about having and using these medicines. We here at [facility] are  committed to working with you in your efforts to get better. To support you in this work, we'll help you schedule regular office appointments for medicine refills. If we must cancel or change your appointment for any reason, we'll make sure you have enough medicine to last until your next appointment.     As a Provider, I will:   Listen carefully to your concerns while treating you with respect.   Recommend a treatment plan that I believe is in your best interest and may involve therapies other than medicine.    Talk with you often about the possible benefits and the risk of harm of any medicine that we prescribe for you.  Assess the safety of this medicine and check how well it works.    Provide a plan on how to taper (discontinue or go off) using this medicine if the decision is made to stop its use.      ::  As a Patient, I understand controlled substances:     Are prescribed by my care provider to help me function or work and manage my condition(s).?  Are strong medicines and can cause serious side effects.     Need to be taken exactly as prescribed.?Combining controlled substances with certain medicines or chemicals (such as illegal drugs, alcohol, sedatives, sleeping pills, and benzodiazepines) can be dangerous or even  fatal.? If I stop taking my medicines suddenly, I may have severe withdrawal symptoms.     The risks, benefits, and side effects of these medicine(s) were explained to me. I agree that:    I will take part in other treatments as advised by my care team. This may be psychiatry or counseling, physical therapy, behavioral therapy, group treatment or a referral to specialist.    I will keep all my appointments and understand this is part of the monitoring of controlled substances.?My care team may require an office visit for EVERY controlled substance refill. If I miss appointments or don t follow instructions, my care team may stop my medicine    I will take my medicines as prescribed. I will not change the dose or schedule unless my care team tells me to. There will be no refills if I run out early.      I may be asked to come to the clinic and complete a urine drug test or complete a pill count. If I don t give a urine sample or participate in a pill count, the care team may stop my medicine.    I will only receive controlled substance prescriptions from this clinic. If I am treated by another provider, I will tell them that I am taking controlled substances and that I have a treatment agreement with this provider. I will inform my [facility] care team within one business day if I am given a prescription for any controlled substance by another healthcare provider. My [facility] care team can contact other providers and pharmacists about my use of any medicines.    It is up to me to make sure that I don't run out of my medicines on weekends or holidays.?If my care team is willing to refill my prescription without a visit, I must request refills only during office hours. Refills may take up to 3 business days to process. I will use one pharmacy to fill all my controlled substance prescriptions. I will notify the clinic about any changes to my insurance or medicine availability.    I am responsible for my  prescriptions. If the medicine/prescription is lost, stolen or destroyed, it will not be replaced.?I also agree not to share controlled substance medicines with anyone.     I am aware I should not use any illegal or recreational drugs. I agree not to drink alcohol unless my care team says I can.     If I enroll in the [place] Medical Cannabis program, I will tell my care team before my next refill.    I will tell my care team right away if I become pregnant, have a new medical problem treated outside of my regular clinic, or have a change in my medicines.     I understand that this medicine can affect my thinking, judgment and reaction time.? Alcohol and drugs affect the brain and body, which can affect the safety of my driving. Being under the influence of alcohol or drugs can affect my decision-making, behaviors, personal safety and the safety of others. Driving while impaired (DWI) can occur if a person is driving, operating or in physical control of a car, motorcycle, boat, snowmobile, ATV, motorbike, off-road vehicle or any other motor vehicle ([place] Statute 169A.20). I understand the risk if I choose to drive or operate any vehicle or machinery.    I understand that if I do not follow any of the conditions above, my prescriptions or treatment may be stopped or changed.   I agree that my provider, clinic care team and pharmacy may work with any city, state or federal law enforcement agency that investigates the misuse, sale or other diversion of my controlled medicine. I will allow my provider to discuss my care with, or share a copy of, this agreement with any other treating provider, pharmacy or emergency room where I receive care.     I have read this agreement and have asked questions about anything I did not understand.    ________________________________________________________  Patient Signature - Nora VELAZQUEZ Pierre     ___________________                   Date     Amanda Lin  MD_____________________________________  Provider Signature - Amanda Cynthia Mckinneyen, MD       ______12/19/23______                   Date     ________________________________________________________  Witness Signature (required if provider not present while patient signing)          ___________________                   Date

## 2023-12-23 ENCOUNTER — HOSPITAL ENCOUNTER (OUTPATIENT)
Dept: CT IMAGING | Facility: CLINIC | Age: 48
Discharge: HOME | End: 2023-12-23
Attending: FAMILY MEDICINE | Admitting: FAMILY MEDICINE
Payer: COMMERCIAL

## 2023-12-23 DIAGNOSIS — Z82.49 FAMILY HISTORY OF ISCHEMIC HEART DISEASE: ICD-10-CM

## 2023-12-23 LAB
CV CALCIUM SCORE AGATSTON LM: 0
CV CALCIUM SCORING AGATSON LAD: 0
CV CALCIUM SCORING AGATSTON CX: 0
CV CALCIUM SCORING AGATSTON RCA: 0
CV CALCIUM SCORING AGATSTON TOTAL: 0

## 2023-12-23 PROCEDURE — 75571 CT HRT W/O DYE W/CA TEST: CPT | Mod: 26 | Performed by: GENERAL ACUTE CARE HOSPITAL

## 2023-12-23 PROCEDURE — 75571 CT HRT W/O DYE W/CA TEST: CPT

## 2024-02-14 ENCOUNTER — TRANSFERRED RECORDS (OUTPATIENT)
Dept: HEALTH INFORMATION MANAGEMENT | Facility: CLINIC | Age: 49
End: 2024-02-14
Payer: COMMERCIAL

## 2024-03-12 ENCOUNTER — VIRTUAL VISIT (OUTPATIENT)
Dept: FAMILY MEDICINE | Facility: CLINIC | Age: 49
End: 2024-03-12
Payer: COMMERCIAL

## 2024-03-12 DIAGNOSIS — J01.00 ACUTE NON-RECURRENT MAXILLARY SINUSITIS: ICD-10-CM

## 2024-03-12 DIAGNOSIS — M25.572 CHRONIC PAIN OF LEFT ANKLE: ICD-10-CM

## 2024-03-12 DIAGNOSIS — G89.29 CHRONIC PAIN OF LEFT ANKLE: ICD-10-CM

## 2024-03-12 DIAGNOSIS — M25.472 LEFT ANKLE SWELLING: ICD-10-CM

## 2024-03-12 DIAGNOSIS — F90.0 ATTENTION DEFICIT HYPERACTIVITY DISORDER (ADHD), PREDOMINANTLY INATTENTIVE TYPE: Primary | ICD-10-CM

## 2024-03-12 PROCEDURE — 99214 OFFICE O/P EST MOD 30 MIN: CPT | Mod: 95 | Performed by: FAMILY MEDICINE

## 2024-03-12 RX ORDER — DEXTROAMPHETAMINE SACCHARATE, AMPHETAMINE ASPARTATE, DEXTROAMPHETAMINE SULFATE AND AMPHETAMINE SULFATE 2.5; 2.5; 2.5; 2.5 MG/1; MG/1; MG/1; MG/1
10 TABLET ORAL DAILY PRN
Qty: 30 TABLET | Refills: 0 | Status: SHIPPED | OUTPATIENT
Start: 2024-03-12 | End: 2024-04-11

## 2024-03-12 RX ORDER — DEXTROAMPHETAMINE SACCHARATE, AMPHETAMINE ASPARTATE MONOHYDRATE, DEXTROAMPHETAMINE SULFATE AND AMPHETAMINE SULFATE 5; 5; 5; 5 MG/1; MG/1; MG/1; MG/1
20 CAPSULE, EXTENDED RELEASE ORAL DAILY
Qty: 30 CAPSULE | Refills: 0 | Status: SHIPPED | OUTPATIENT
Start: 2024-03-12 | End: 2024-09-09

## 2024-03-12 RX ORDER — DEXTROAMPHETAMINE SACCHARATE, AMPHETAMINE ASPARTATE MONOHYDRATE, DEXTROAMPHETAMINE SULFATE AND AMPHETAMINE SULFATE 5; 5; 5; 5 MG/1; MG/1; MG/1; MG/1
20 CAPSULE, EXTENDED RELEASE ORAL DAILY
Qty: 30 CAPSULE | Refills: 0 | Status: SHIPPED | OUTPATIENT
Start: 2024-04-12 | End: 2024-05-12

## 2024-03-12 RX ORDER — DEXTROAMPHETAMINE SACCHARATE, AMPHETAMINE ASPARTATE MONOHYDRATE, DEXTROAMPHETAMINE SULFATE AND AMPHETAMINE SULFATE 5; 5; 5; 5 MG/1; MG/1; MG/1; MG/1
20 CAPSULE, EXTENDED RELEASE ORAL DAILY
Qty: 30 CAPSULE | Refills: 0 | Status: SHIPPED | OUTPATIENT
Start: 2024-05-13 | End: 2024-06-12

## 2024-03-12 NOTE — PATIENT INSTRUCTIONS
For your symptoms:     Vitamin C, zinc and echinacea help to improve immunity and fight infection.     Warm pack to face 4 times a day for 15 min at a time will help loosen phlegm     Saline nasal washing will help to drain mucus and clear sinus infection. - ocean spray and simply saline     NetiPot or NeilMed Sinus Rinse (use distilled water or water that has been boiled and then cooled)    Tylenol 325 mg extra strength:    - Take 1 to 2 tablets by mouth every 4 hours as needed (maximum 4000mg orally per day)     Ibuprofen 200 mg tablets:    - Take 2-4 tablets up to 3 times a day as needed. (always with food, call for stomach upset) avoid if pregnant.     Pseudophedrine containing medications (the kind you have to get behind the counter at the pharmacy)    Afrin.  Do not use for more than three days    Cepacol lozenges or chloraseptic spray may also help numb a sore throat.     Gargling with salt water or drinking hot water may also help.     Drink 2 liters of water today slowly and continue to hydrate aggressively     HUMIDIFIER MAY ALSO BE HELPFUL.      You may want to try warm salt water gargles or rinses to feel better or help prevent another bout in the future. Mix 1 teaspoon of salt in 8 ounces of water, gargle, and spit. Do this several times a day for several days. Do not swallow the mixture.

## 2024-03-13 ENCOUNTER — MYC MEDICAL ADVICE (OUTPATIENT)
Dept: FAMILY MEDICINE | Facility: CLINIC | Age: 49
End: 2024-03-13
Payer: COMMERCIAL

## 2024-03-13 DIAGNOSIS — F40.240 CLAUSTROPHOBIA: Primary | ICD-10-CM

## 2024-03-14 RX ORDER — DIAZEPAM 5 MG
5 TABLET ORAL EVERY 6 HOURS PRN
Qty: 4 TABLET | Refills: 0 | Status: SHIPPED | OUTPATIENT
Start: 2024-03-14 | End: 2024-06-18

## 2024-05-03 ENCOUNTER — MYC MEDICAL ADVICE (OUTPATIENT)
Dept: FAMILY MEDICINE | Facility: CLINIC | Age: 49
End: 2024-05-03
Payer: COMMERCIAL

## 2024-05-03 DIAGNOSIS — F90.0 ATTENTION DEFICIT HYPERACTIVITY DISORDER (ADHD), PREDOMINANTLY INATTENTIVE TYPE: Primary | ICD-10-CM

## 2024-05-03 RX ORDER — DEXTROAMPHETAMINE SACCHARATE, AMPHETAMINE ASPARTATE, DEXTROAMPHETAMINE SULFATE AND AMPHETAMINE SULFATE 2.5; 2.5; 2.5; 2.5 MG/1; MG/1; MG/1; MG/1
10 TABLET ORAL DAILY PRN
Qty: 30 TABLET | Refills: 0 | Status: SHIPPED | OUTPATIENT
Start: 2024-05-03 | End: 2024-06-10

## 2024-05-03 NOTE — TELEPHONE ENCOUNTER
PMDP reviewed;   10mg IR last sent 3/12. Will send refill today.     The 20mg ER has been filled 1/22, 2/23 and a slight delay with last fill at 4/8/2024. Should be able to get May 20mg dose soon.

## 2024-05-03 NOTE — TELEPHONE ENCOUNTER
Routing message to PCP for review and advise on request for Adderall refill    Adderall 10mg IR last written 3/12/24 qty 30   Last visit 3/12/24 ADHD med check

## 2024-06-10 ENCOUNTER — MYC REFILL (OUTPATIENT)
Dept: FAMILY MEDICINE | Facility: CLINIC | Age: 49
End: 2024-06-10
Payer: COMMERCIAL

## 2024-06-10 DIAGNOSIS — F90.0 ATTENTION DEFICIT HYPERACTIVITY DISORDER (ADHD), PREDOMINANTLY INATTENTIVE TYPE: ICD-10-CM

## 2024-06-11 RX ORDER — DEXTROAMPHETAMINE SACCHARATE, AMPHETAMINE ASPARTATE, DEXTROAMPHETAMINE SULFATE AND AMPHETAMINE SULFATE 2.5; 2.5; 2.5; 2.5 MG/1; MG/1; MG/1; MG/1
10 TABLET ORAL DAILY PRN
Qty: 30 TABLET | Refills: 0 | Status: SHIPPED | OUTPATIENT
Start: 2024-06-11

## 2024-06-18 ENCOUNTER — VIRTUAL VISIT (OUTPATIENT)
Dept: FAMILY MEDICINE | Facility: CLINIC | Age: 49
End: 2024-06-18
Payer: COMMERCIAL

## 2024-06-18 DIAGNOSIS — Z91.030 ALLERGY TO HONEY BEE VENOM: ICD-10-CM

## 2024-06-18 DIAGNOSIS — F51.4 NIGHT TERRORS: ICD-10-CM

## 2024-06-18 DIAGNOSIS — M25.572 CHRONIC PAIN OF LEFT ANKLE: ICD-10-CM

## 2024-06-18 DIAGNOSIS — G89.29 CHRONIC PAIN OF LEFT ANKLE: ICD-10-CM

## 2024-06-18 DIAGNOSIS — F40.10 SOCIAL ANXIETY DISORDER: ICD-10-CM

## 2024-06-18 DIAGNOSIS — F51.04 PSYCHOPHYSIOLOGICAL INSOMNIA: ICD-10-CM

## 2024-06-18 DIAGNOSIS — F40.10 SOCIAL PHOBIA INVOLVING FEAR OF PUBLIC SPEAKING: ICD-10-CM

## 2024-06-18 DIAGNOSIS — F90.0 ATTENTION DEFICIT HYPERACTIVITY DISORDER (ADHD), PREDOMINANTLY INATTENTIVE TYPE: Primary | ICD-10-CM

## 2024-06-18 DIAGNOSIS — M25.472 LEFT ANKLE SWELLING: ICD-10-CM

## 2024-06-18 DIAGNOSIS — Z86.19 HISTORY OF COLD SORES: ICD-10-CM

## 2024-06-18 DIAGNOSIS — M79.18 MYOFASCIAL PAIN: ICD-10-CM

## 2024-06-18 PROCEDURE — G2211 COMPLEX E/M VISIT ADD ON: HCPCS | Mod: 95 | Performed by: FAMILY MEDICINE

## 2024-06-18 PROCEDURE — 99214 OFFICE O/P EST MOD 30 MIN: CPT | Mod: 95 | Performed by: FAMILY MEDICINE

## 2024-06-18 RX ORDER — DEXTROAMPHETAMINE SACCHARATE, AMPHETAMINE ASPARTATE MONOHYDRATE, DEXTROAMPHETAMINE SULFATE AND AMPHETAMINE SULFATE 5; 5; 5; 5 MG/1; MG/1; MG/1; MG/1
20 CAPSULE, EXTENDED RELEASE ORAL DAILY
Qty: 30 CAPSULE | Refills: 0 | Status: SHIPPED | OUTPATIENT
Start: 2024-07-18 | End: 2024-08-17

## 2024-06-18 RX ORDER — CYCLOBENZAPRINE HCL 10 MG
10 TABLET ORAL
Qty: 30 TABLET | Refills: 3 | Status: SHIPPED | OUTPATIENT
Start: 2024-06-18

## 2024-06-18 RX ORDER — DEXTROAMPHETAMINE SACCHARATE, AMPHETAMINE ASPARTATE, DEXTROAMPHETAMINE SULFATE AND AMPHETAMINE SULFATE 2.5; 2.5; 2.5; 2.5 MG/1; MG/1; MG/1; MG/1
10 TABLET ORAL
Qty: 30 TABLET | Refills: 0 | Status: SHIPPED | OUTPATIENT
Start: 2024-08-17 | End: 2024-09-09

## 2024-06-18 RX ORDER — EPINEPHRINE 0.3 MG/.3ML
0.3 INJECTION SUBCUTANEOUS PRN
Qty: 2 EACH | Refills: 1 | Status: SHIPPED | OUTPATIENT
Start: 2024-06-18

## 2024-06-18 RX ORDER — DEXTROAMPHETAMINE SACCHARATE, AMPHETAMINE ASPARTATE MONOHYDRATE, DEXTROAMPHETAMINE SULFATE AND AMPHETAMINE SULFATE 5; 5; 5; 5 MG/1; MG/1; MG/1; MG/1
20 CAPSULE, EXTENDED RELEASE ORAL DAILY
Qty: 30 CAPSULE | Refills: 0 | Status: SHIPPED | OUTPATIENT
Start: 2024-08-17 | End: 2024-09-16

## 2024-06-18 RX ORDER — PROPRANOLOL HYDROCHLORIDE 20 MG/1
20-40 TABLET ORAL 2 TIMES DAILY PRN
Qty: 30 TABLET | Refills: 1 | Status: SHIPPED | OUTPATIENT
Start: 2024-06-18

## 2024-06-18 RX ORDER — DEXTROAMPHETAMINE SACCHARATE, AMPHETAMINE ASPARTATE, DEXTROAMPHETAMINE SULFATE AND AMPHETAMINE SULFATE 2.5; 2.5; 2.5; 2.5 MG/1; MG/1; MG/1; MG/1
10 TABLET ORAL
Qty: 30 TABLET | Refills: 0 | Status: SHIPPED | OUTPATIENT
Start: 2024-06-18 | End: 2024-07-18

## 2024-06-18 RX ORDER — DEXTROAMPHETAMINE SACCHARATE, AMPHETAMINE ASPARTATE, DEXTROAMPHETAMINE SULFATE AND AMPHETAMINE SULFATE 2.5; 2.5; 2.5; 2.5 MG/1; MG/1; MG/1; MG/1
10 TABLET ORAL
Qty: 30 TABLET | Refills: 0 | Status: SHIPPED | OUTPATIENT
Start: 2024-07-18 | End: 2024-08-17

## 2024-06-18 RX ORDER — MINOXIDIL 2.5 MG/1
2.5 TABLET ORAL DAILY
COMMUNITY

## 2024-06-18 RX ORDER — KETOCONAZOLE 20 MG/ML
SHAMPOO TOPICAL
COMMUNITY
Start: 2024-02-14

## 2024-06-18 RX ORDER — DEXTROAMPHETAMINE SACCHARATE, AMPHETAMINE ASPARTATE MONOHYDRATE, DEXTROAMPHETAMINE SULFATE AND AMPHETAMINE SULFATE 5; 5; 5; 5 MG/1; MG/1; MG/1; MG/1
20 CAPSULE, EXTENDED RELEASE ORAL DAILY
Qty: 30 CAPSULE | Refills: 0 | Status: SHIPPED | OUTPATIENT
Start: 2024-06-18 | End: 2024-07-18

## 2024-06-18 RX ORDER — VALACYCLOVIR HYDROCHLORIDE 500 MG/1
TABLET, FILM COATED ORAL
Qty: 60 TABLET | Refills: 3 | Status: SHIPPED | OUTPATIENT
Start: 2024-06-18 | End: 2024-09-09

## 2024-06-18 NOTE — PROGRESS NOTES
Leila is a 49 year old who is being evaluated via a billable video visit.    How would you like to obtain your AVS? MyChart  If the video visit is dropped, the invitation should be resent by: Text to cell phone: [phone]  Will anyone else be joining your video visit? No    Assessment & Plan     Attention deficit hyperactivity disorder (ADHD), predominantly inattentive type  Overall improvement with 20mg XR Adderall on board and 10mg IR in the afternoons.  - PMDP reviewed, no concerns.   - Due to have meds for early July; will send 3 month fills for 20mg XR and 10mg IR Adderall to have on file. #30 each.   - Sending to new pharmacy per pt request due to access/availability.   - Update CSA at Sept physical   - CSA was sent in Dec and she states bringing this back to our clinic - showed up in our media tab by March after going through [facility] records waiting to be scanned      Left ankle swelling  Chronic pain of left ankle  Hasn't proceeded with the plan we discussed back in March yet (MRI and [facility] referral) due to not getting called she thinks and cost concerns as well.     Ongoing pain  and swelling of left ankle since re-injury (rolled the ankle) in 2019. Has now done PT several times with no substantial improvements as well as cortisone injection. Would like to move forward with MRI and ortho referral in system. We have previously and again reviewed lymphatic tissue damage after surgery and injuries and suggestion to consider compression stockings to see if this reduces the pain/swelling as well.      - MR Ankle Left w/o Contrast; Future (this was ordered in March)  - Orthopedic  Referral; Future (this was reordered today; for [facility] team)       Myofascial pain  Stable, Refilled to new pharmacy  - cyclobenzaprine (FLEXERIL) 10 MG tablet; Take 1 tablet (10 mg) by mouth nightly as needed for muscle spasms    Social anxiety disorder  Social phobia involving fear of public speaking  Stable, Refilled to new  "pharmacy  - propranolol (INDERAL) 20 MG tablet; Take 1-2 tablets (20-40 mg) by mouth 2 times daily as needed (for anxiety prior to work presentations or social events)    History of cold sores  Refilled to new pharmacy  - valACYclovir (VALTREX) 500 MG tablet; [VALACYCLOVIR (VALTREX) 500 MG TABLET] TAKE 2 TABLETS BY MOUTH TWICE DAILY FOR 5 DAYS for flares; can take 1 tablet by mouth for prevention    Allergy to honey bee venom  Refilled to new pharmacy  - EPINEPHrine (ANY BX GENERIC EQUIV) 0.3 MG/0.3ML injection 2-pack; Inject 0.3 mLs (0.3 mg) into the muscle as needed for anaphylaxis May repeat one time in 5-15 minutes if response to initial dose is inadequate.        The longitudinal plan of care for the diagnosis(es)/condition(s) as documented were addressed during this visit. Due to the added complexity in care, I will continue to support Leila in the subsequent management and with ongoing continuity of care.    BMI  Estimated body mass index is 28.78 kg/m  as calculated from the following:    Height as of 9/26/23: 1.596 m (5' 2.84\").    Weight as of 9/26/23: 73.3 kg (161 lb 9.6 oz).   Weight management plan: Discussed healthy diet and exercise guidelines    Follow-up in 3 months      Subjective   Leila is a 49 year old, presenting for the following health issues:  Recheck Medication (MED CHECK)    History of Present Illness       Reason for visit:  Prescription refill and verify referral for left ankle issue    She eats 2-3 servings of fruits and vegetables daily.She consumes 1 sweetened beverage(s) daily.She exercises with enough effort to increase her heart rate 30 to 60 minutes per day.  She exercises with enough effort to increase her heart rate 5 days per week.   She is taking medications regularly.         ADHD: She states [facility] won't fill controlled substances anymore for patients who aren't getting in person visits once a year.   *due to a lawsuit.   She would like to transition " prescriptions  She hasn't been able to get the 10mg IR in the afternoons until this month.   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 (Prn propranolol also helpful for speech anxiety)    PMDP reviewed        Left ankle swelling:  She has held off on the left ankle MRI for now due to cost concern; hasn't heard from [facility] to schedule (referral was placed back in March).   She didn't have a great experience with [facility] orth[facility] so she wanted to be seen by another provider/system.       To review from 3/2024 note:   Hx of left ankle surgery years ago; but since about May 2019 had re-rolled the ankle and chronic recurring swelling in that area. In 2019 the MRI was normal of that ankle, but since then has had an ultrasound during a cortisone injection showed possibly bony changes and inflammation from the surgery area. She hasn't seen ortho back in a long time.      She has ankle swelling from prior surgery location; doing a lot of PT and ortho visits in the past. Has been to both [facility] and [facility] for PT.   She has been denied by insurance for a prior attempt at ankle MRI                 Objective           Vitals:  No vitals were obtained today due to virtual visit.    Physical Exam   GENERAL: alert and no distress  EYES: Eyes grossly normal to inspection.  No discharge or erythema, or obvious scleral/conjunctival abnormalities.  RESP: No audible wheeze, cough, or visible cyanosis.    SKIN: Visible skin clear. No significant rash, abnormal pigmentation or lesions.  NEURO: Cranial nerves grossly intact.  Mentation and speech appropriate for age.  PSYCH: Appropriate affect, tone, and pace of words          Video-Visit Details    Type of service:  Video Visit   Originating Location (pt. Location): Home    Distant Location (provider location):  On-site  Platform used for Video Visit: Michael  Signed Electronically by:  Amanda Lin MD

## 2024-08-14 ENCOUNTER — TRANSFERRED RECORDS (OUTPATIENT)
Dept: HEALTH INFORMATION MANAGEMENT | Facility: CLINIC | Age: 49
End: 2024-08-14
Payer: COMMERCIAL

## 2024-09-09 ENCOUNTER — OFFICE VISIT (OUTPATIENT)
Dept: FAMILY MEDICINE | Facility: CLINIC | Age: 49
End: 2024-09-09
Attending: FAMILY MEDICINE
Payer: COMMERCIAL

## 2024-09-09 VITALS
DIASTOLIC BLOOD PRESSURE: 84 MMHG | WEIGHT: 160.2 LBS | BODY MASS INDEX: 28.39 KG/M2 | HEART RATE: 92 BPM | SYSTOLIC BLOOD PRESSURE: 122 MMHG | HEIGHT: 63 IN | OXYGEN SATURATION: 98 % | RESPIRATION RATE: 14 BRPM

## 2024-09-09 DIAGNOSIS — R79.89 ABNORMAL TSH: ICD-10-CM

## 2024-09-09 DIAGNOSIS — F90.0 ATTENTION DEFICIT HYPERACTIVITY DISORDER (ADHD), PREDOMINANTLY INATTENTIVE TYPE: ICD-10-CM

## 2024-09-09 DIAGNOSIS — M79.18 MYOFASCIAL PAIN: ICD-10-CM

## 2024-09-09 DIAGNOSIS — G89.29 CHRONIC PAIN OF LEFT ANKLE: ICD-10-CM

## 2024-09-09 DIAGNOSIS — Z86.19 HISTORY OF COLD SORES: ICD-10-CM

## 2024-09-09 DIAGNOSIS — M25.472 LEFT ANKLE SWELLING: ICD-10-CM

## 2024-09-09 DIAGNOSIS — F40.10 SOCIAL PHOBIA INVOLVING FEAR OF PUBLIC SPEAKING: ICD-10-CM

## 2024-09-09 DIAGNOSIS — M25.572 CHRONIC PAIN OF LEFT ANKLE: ICD-10-CM

## 2024-09-09 DIAGNOSIS — Z00.00 ROUTINE GENERAL MEDICAL EXAMINATION AT A HEALTH CARE FACILITY: Primary | ICD-10-CM

## 2024-09-09 LAB
HBA1C MFR BLD: 5.6 % (ref 0–5.6)
HGB BLD-MCNC: 12.6 G/DL (ref 11.7–15.7)

## 2024-09-09 PROCEDURE — 84443 ASSAY THYROID STIM HORMONE: CPT | Performed by: FAMILY MEDICINE

## 2024-09-09 PROCEDURE — 90656 IIV3 VACC NO PRSV 0.5 ML IM: CPT | Performed by: FAMILY MEDICINE

## 2024-09-09 PROCEDURE — 90471 IMMUNIZATION ADMIN: CPT | Performed by: FAMILY MEDICINE

## 2024-09-09 PROCEDURE — 85018 HEMOGLOBIN: CPT | Performed by: FAMILY MEDICINE

## 2024-09-09 PROCEDURE — 99396 PREV VISIT EST AGE 40-64: CPT | Mod: 25 | Performed by: FAMILY MEDICINE

## 2024-09-09 PROCEDURE — 83036 HEMOGLOBIN GLYCOSYLATED A1C: CPT | Performed by: FAMILY MEDICINE

## 2024-09-09 PROCEDURE — 36415 COLL VENOUS BLD VENIPUNCTURE: CPT | Performed by: FAMILY MEDICINE

## 2024-09-09 PROCEDURE — 99214 OFFICE O/P EST MOD 30 MIN: CPT | Mod: 25 | Performed by: FAMILY MEDICINE

## 2024-09-09 PROCEDURE — 84439 ASSAY OF FREE THYROXINE: CPT | Performed by: FAMILY MEDICINE

## 2024-09-09 PROCEDURE — 80061 LIPID PANEL: CPT | Performed by: FAMILY MEDICINE

## 2024-09-09 PROCEDURE — 84480 ASSAY TRIIODOTHYRONINE (T3): CPT | Performed by: FAMILY MEDICINE

## 2024-09-09 RX ORDER — VALACYCLOVIR HYDROCHLORIDE 500 MG/1
TABLET, FILM COATED ORAL
Qty: 60 TABLET | Refills: 3 | Status: SHIPPED | OUTPATIENT
Start: 2024-09-09

## 2024-09-09 RX ORDER — DEXTROAMPHETAMINE SACCHARATE, AMPHETAMINE ASPARTATE MONOHYDRATE, DEXTROAMPHETAMINE SULFATE AND AMPHETAMINE SULFATE 5; 5; 5; 5 MG/1; MG/1; MG/1; MG/1
20 CAPSULE, EXTENDED RELEASE ORAL DAILY
Qty: 30 CAPSULE | Refills: 0 | Status: SHIPPED | OUTPATIENT
Start: 2024-09-13

## 2024-09-09 RX ORDER — DEXTROAMPHETAMINE SACCHARATE, AMPHETAMINE ASPARTATE, DEXTROAMPHETAMINE SULFATE AND AMPHETAMINE SULFATE 2.5; 2.5; 2.5; 2.5 MG/1; MG/1; MG/1; MG/1
10 TABLET ORAL
Qty: 30 TABLET | Refills: 0 | Status: SHIPPED | OUTPATIENT
Start: 2024-09-13

## 2024-09-09 RX ORDER — BETAMETHASONE DIPROPIONATE 0.5 MG/G
CREAM TOPICAL
COMMUNITY
Start: 2024-08-19

## 2024-09-09 NOTE — PROGRESS NOTES
Preventive Care Visit  [facility]  Amanda Lin MD, Family Medicine  Sep 9, 2024      Assessment & Plan     Routine general medical examination at a health care facility  Pap is UTD and now s/p hysterectomy (total) no longer needing  Mammograms encouraged annually  Colonoscopy UTD  - PRIMARY CARE FOLLOW-UP SCHEDULING; Future  - Hemoglobin A1c  - Hemoglobin  - Lipid panel reflex to direct LDL Non-fasting  - TSH with free T4 reflex    Attention deficit hyperactivity disorder (ADHD), predominantly inattentive type  Overall improvement with 20mg XR Adderall on board and 10mg IR in the afternoons.  - PMDP reviewed, no concerns.   - CSA signed March 2023 (media tab)     - evisit in 3 months, 6mo VV     Left ankle swelling  Chronic pain of left ankle  Hasn't proceeded with the plan we discussed back in March yet (MRI and [facility] referral) due to not getting called she thinks and cost concerns as well.      Ongoing pain  and swelling of left ankle since re-injury (rolled the ankle) in 2019. Has now done PT several times with no substantial improvements as well as cortisone injection. Would like to move forward with MRI and ortho referral in system. We have previously and again reviewed lymphatic tissue damage after surgery and injuries and suggestion to consider compression stockings to see if this reduces the pain/swelling as well.      - MR Ankle Left w/o Contrast; Future (this was ordered in March)  - Orthopedic  Referral; Future (this was reordered last visit; for [facility] team)        Myofascial pain  Stable,  - cyclobenzaprine (FLEXERIL) 10 MG tablet; Take 1 tablet (10 mg) by mouth nightly as needed for muscle spasms     Social anxiety disorder  Social phobia involving fear of public speaking  Stable, not using propranolol too often but effective when needed    History of cold sores  Refilled- offered daily prevention for 1 year which she declines fo rnow    - valACYclovir  "(VALTREX) 500 MG tablet; [VALACYCLOVIR (VALTREX) 500 MG TABLET] TAKE 2 TABLETS BY MOUTH TWICE DAILY FOR 5 DAYS for flares; can take 1 tablet by mouth for prevention            Counseling  Appropriate preventive services were addressed with this patient via screening, questionnaire, or discussion as appropriate for fall prevention, nutrition, physical activity, Tobacco-use cessation, social engagement, weight loss and cognition.  Checklist reviewing preventive services available has been given to the patient.  Reviewed patient's diet, addressing concerns and/or questions.           Subjective   Leila is a 49 year old, presenting for the following:  Physical        9/9/2024     4:37 PM   Additional Questions   Roomed by Sydnie MANUEL MA        Health Care Directive  Patient does not have a Health Care Directive or Living Will: Discussed advance care planning with patient; information given to patient to review.    HPI  Chief Complaint   Patient presents with    Physical       ADHD: Taking 20mg Adderall XR  daily and 10mg IR in afternoons and finding this helpful for her work and staying focused now that she is traveling more and doing more presentations. (Prn propranolol also helpful for speech anxiety)     Today she shares she would like to send these Rx back to [facility] (see prior note on that) since we are seeing more in person again.     She hasn't done a sleep study yet for the restlessness/sleep terrors. Sleeps very heavy    She didn't yet schedule with ortho about her left ankle issue    Hx of  hysterectomy    Needs refill of valtrex- takes daily preventative \"occasionally\" to prevent oncoming sx if known triggers and treatment dose 6 times a year for a flare              9/9/2024   General Health   How would you rate your overall physical health? Good   Feel stress (tense, anxious, or unable to sleep) To some extent      (!) STRESS CONCERN      9/9/2024   Nutrition   Three or more servings of calcium each day? Yes "   Diet: Regular (no restrictions)   How many servings of fruit and vegetables per day? (!) 2-3   How many sweetened beverages each day? 0-1            2024   Exercise   Days per week of moderate/strenous exercise 4 days   Average minutes spent exercising at this level 30 min            2024   Social Factors   Frequency of gathering with friends or relatives Once a week   Worry food won't last until get money to buy more No   Food not last or not have enough money for food? No   Do you have housing? (Housing is defined as stable permanent housing and does not include staying ouside in a car, in a tent, in an abandoned building, in an overnight shelter, or couch-surfing.) Yes   Are you worried about losing your housing? No   Lack of transportation? No   Unable to get utilities (heat,electricity)? No            2024   Dental   Dentist two times every year? Yes            2024   TB Screening   Were you born outside of the [place]? No      Today's PHQ-2 Score:       3/11/2024    10:43 AM   PHQ-2 (  [employer])   Q1: Little interest or pleasure in doing things 0   Q2: Feeling down, depressed or hopeless 0   PHQ-2 Score 0   Q1: Little interest or pleasure in doing things Not at all   Q2: Feeling down, depressed or hopeless Not at all   PHQ-2 Score 0         2024   Substance Use   Alcohol more than 3/day or more than 7/wk No   Do you use any other substances recreationally? No        Social History     Tobacco Use    Smoking status: Former     Current packs/day: 0.00     Types: Cigarettes     Quit date: 2018     Years since quittin.5     Passive exposure: Never    Smokeless tobacco: Never   Vaping Use    Vaping status: Never Used   Substance Use Topics    Alcohol use: No    Drug use: No           2023   LAST FHS-7 RESULTS   1st degree relative breast or ovarian cancer No   Any relative bilateral breast cancer No   Any male have breast cancer No   Any ONE woman have BOTH breast AND ovarian cancer  "No   Any woman with breast cancer before 50yrs No   2 or more relatives with breast AND/OR ovarian cancer No   2 or more relatives with breast AND/OR bowel cancer No                   9/9/2024   STI Screening   New sexual partner(s) since last STI/HIV test? No        History of abnormal Pap smear: No - age 30- 64 PAP with HPV every 5 years recommended       ASCVD Risk   The 10-year ASCVD risk score (Herbert CURRY, et al., 2019) is: 0.6%    Values used to calculate the score:      Age: 49 years      Sex: Female      Is Non- : No      Diabetic: No      Tobacco smoker: No      Systolic Blood Pressure: 122 mmHg      Is BP treated: No      HDL Cholesterol: 81 mg/dL      Total Cholesterol: 187 mg/dL      Reviewed and updated as needed this visit by Provider   Tobacco  Allergies  Meds  Problems  Med Hx  Surg Hx  Fam Hx                     Objective    Exam  /84 (BP Location: Left arm, Patient Position: Sitting, Cuff Size: Adult Regular)   Pulse 92   Resp 14   Ht 1.596 m (5' 2.84\")   Wt 72.7 kg (160 lb 3.2 oz)   SpO2 98%   BMI 28.52 kg/m     Estimated body mass index is 28.52 kg/m  as calculated from the following:    Height as of this encounter: 1.596 m (5' 2.84\").    Weight as of this encounter: 72.7 kg (160 lb 3.2 oz).    Physical Exam  GENERAL: alert and no distress  EYES: Eyes grossly normal to inspection, PERRL and conjunctivae and sclerae normal  HENT: ear canals and TM's normal, nose and mouth without ulcers or lesions  NECK: no adenopathy, no asymmetry, masses, or scars  RESP: lungs clear to auscultation - no rales, rhonchi or wheezes  CV: regular rate and rhythm, normal S1 S2, no S3 or S4, no murmur, click or rub, no peripheral edema  ABDOMEN: soft, nontender, no hepatosplenomegaly, no masses and bowel sounds normal  MS: no gross musculoskeletal defects noted, no edema  SKIN: no suspicious lesions or rashes  NEURO: Normal strength and tone, mentation intact and " speech normal  PSYCH: mentation appears normal, affect normal/bright        Signed Electronically by: Amanda Lin MD

## 2024-09-10 LAB
CHOLEST SERPL-MCNC: 169 MG/DL
FASTING STATUS PATIENT QL REPORTED: NORMAL
HDLC SERPL-MCNC: 60 MG/DL
LDLC SERPL CALC-MCNC: 82 MG/DL
NONHDLC SERPL-MCNC: 109 MG/DL
T4 FREE SERPL-MCNC: 1.07 NG/DL (ref 0.9–1.7)
TRIGL SERPL-MCNC: 136 MG/DL
TSH SERPL DL<=0.005 MIU/L-ACNC: 0.15 UIU/ML (ref 0.3–4.2)

## 2024-09-11 LAB — T3 SERPL-MCNC: 128 NG/DL (ref 85–202)

## 2024-09-11 NOTE — ADDENDUM NOTE
Addended by: ALFREDITO KIDD on: 9/11/2024 03:25 PM     Modules accepted: Orders     Bexarotene Counseling:  I discussed with the patient the risks of bexarotene including but not limited to hair loss, dry lips/skin/eyes, liver abnormalities, hyperlipidemia, pancreatitis, depression/suicidal ideation, photosensitivity, drug rash/allergic reactions, hypothyroidism, anemia, leukopenia, infection, cataracts, and teratogenicity.  Patient understands that they will need regular blood tests to check lipid profile, liver function tests, white blood cell count, thyroid function tests and pregnancy test if applicable.

## 2024-10-28 ENCOUNTER — MYC REFILL (OUTPATIENT)
Dept: FAMILY MEDICINE | Facility: CLINIC | Age: 49
End: 2024-10-28
Payer: COMMERCIAL

## 2024-10-28 DIAGNOSIS — F90.0 ATTENTION DEFICIT HYPERACTIVITY DISORDER (ADHD), PREDOMINANTLY INATTENTIVE TYPE: ICD-10-CM

## 2024-10-29 RX ORDER — DEXTROAMPHETAMINE SACCHARATE, AMPHETAMINE ASPARTATE MONOHYDRATE, DEXTROAMPHETAMINE SULFATE AND AMPHETAMINE SULFATE 5; 5; 5; 5 MG/1; MG/1; MG/1; MG/1
20 CAPSULE, EXTENDED RELEASE ORAL DAILY
Qty: 30 CAPSULE | Refills: 0 | Status: SHIPPED | OUTPATIENT
Start: 2024-10-29

## 2024-10-29 RX ORDER — DEXTROAMPHETAMINE SACCHARATE, AMPHETAMINE ASPARTATE, DEXTROAMPHETAMINE SULFATE AND AMPHETAMINE SULFATE 2.5; 2.5; 2.5; 2.5 MG/1; MG/1; MG/1; MG/1
10 TABLET ORAL
Qty: 30 TABLET | Refills: 0 | Status: SHIPPED | OUTPATIENT
Start: 2024-10-29

## 2024-12-04 ENCOUNTER — TELEPHONE (OUTPATIENT)
Dept: FAMILY MEDICINE | Facility: CLINIC | Age: 49
End: 2024-12-04

## 2024-12-04 ENCOUNTER — VIRTUAL VISIT (OUTPATIENT)
Dept: FAMILY MEDICINE | Facility: CLINIC | Age: 49
End: 2024-12-04
Payer: COMMERCIAL

## 2024-12-04 DIAGNOSIS — F43.21 GRIEF: ICD-10-CM

## 2024-12-04 DIAGNOSIS — F90.0 ATTENTION DEFICIT HYPERACTIVITY DISORDER (ADHD), PREDOMINANTLY INATTENTIVE TYPE: Primary | ICD-10-CM

## 2024-12-04 DIAGNOSIS — F41.8 SITUATIONAL ANXIETY: ICD-10-CM

## 2024-12-04 DIAGNOSIS — R07.89 ATYPICAL CHEST PAIN: ICD-10-CM

## 2024-12-04 DIAGNOSIS — R79.89 ABNORMAL TSH: ICD-10-CM

## 2024-12-04 PROCEDURE — G2211 COMPLEX E/M VISIT ADD ON: HCPCS | Mod: 95 | Performed by: FAMILY MEDICINE

## 2024-12-04 PROCEDURE — 99214 OFFICE O/P EST MOD 30 MIN: CPT | Mod: 95 | Performed by: FAMILY MEDICINE

## 2024-12-04 RX ORDER — DEXTROAMPHETAMINE SACCHARATE, AMPHETAMINE ASPARTATE MONOHYDRATE, DEXTROAMPHETAMINE SULFATE AND AMPHETAMINE SULFATE 5; 5; 5; 5 MG/1; MG/1; MG/1; MG/1
20 CAPSULE, EXTENDED RELEASE ORAL DAILY
Qty: 30 CAPSULE | Refills: 0 | Status: SHIPPED | OUTPATIENT
Start: 2025-02-02 | End: 2025-03-04

## 2024-12-04 RX ORDER — HYDROXYZINE HYDROCHLORIDE 10 MG/1
10-30 TABLET, FILM COATED ORAL 3 TIMES DAILY PRN
Qty: 90 TABLET | Refills: 2 | Status: SHIPPED | OUTPATIENT
Start: 2024-12-04

## 2024-12-04 RX ORDER — DEXTROAMPHETAMINE SACCHARATE, AMPHETAMINE ASPARTATE MONOHYDRATE, DEXTROAMPHETAMINE SULFATE AND AMPHETAMINE SULFATE 2.5; 2.5; 2.5; 2.5 MG/1; MG/1; MG/1; MG/1
10 CAPSULE, EXTENDED RELEASE ORAL DAILY
Qty: 30 CAPSULE | Refills: 0 | Status: SHIPPED | OUTPATIENT
Start: 2024-12-04 | End: 2024-12-04

## 2024-12-04 RX ORDER — DEXTROAMPHETAMINE SACCHARATE, AMPHETAMINE ASPARTATE MONOHYDRATE, DEXTROAMPHETAMINE SULFATE AND AMPHETAMINE SULFATE 5; 5; 5; 5 MG/1; MG/1; MG/1; MG/1
20 CAPSULE, EXTENDED RELEASE ORAL DAILY
Qty: 30 CAPSULE | Refills: 0 | Status: SHIPPED | OUTPATIENT
Start: 2025-01-03 | End: 2025-02-02

## 2024-12-04 RX ORDER — DEXTROAMPHETAMINE SACCHARATE, AMPHETAMINE ASPARTATE, DEXTROAMPHETAMINE SULFATE AND AMPHETAMINE SULFATE 2.5; 2.5; 2.5; 2.5 MG/1; MG/1; MG/1; MG/1
10 TABLET ORAL DAILY
Qty: 30 TABLET | Refills: 0 | Status: SHIPPED | OUTPATIENT
Start: 2025-02-02 | End: 2025-03-04

## 2024-12-04 RX ORDER — DEXTROAMPHETAMINE SACCHARATE, AMPHETAMINE ASPARTATE MONOHYDRATE, DEXTROAMPHETAMINE SULFATE AND AMPHETAMINE SULFATE 5; 5; 5; 5 MG/1; MG/1; MG/1; MG/1
20 CAPSULE, EXTENDED RELEASE ORAL DAILY
Qty: 30 CAPSULE | Refills: 0 | Status: SHIPPED | OUTPATIENT
Start: 2024-12-04 | End: 2025-01-03

## 2024-12-04 RX ORDER — DEXTROAMPHETAMINE SACCHARATE, AMPHETAMINE ASPARTATE, DEXTROAMPHETAMINE SULFATE AND AMPHETAMINE SULFATE 2.5; 2.5; 2.5; 2.5 MG/1; MG/1; MG/1; MG/1
10 TABLET ORAL DAILY
Qty: 30 TABLET | Refills: 0 | Status: SHIPPED | OUTPATIENT
Start: 2024-12-04 | End: 2025-01-03

## 2024-12-04 RX ORDER — DEXTROAMPHETAMINE SACCHARATE, AMPHETAMINE ASPARTATE MONOHYDRATE, DEXTROAMPHETAMINE SULFATE AND AMPHETAMINE SULFATE 5; 5; 5; 5 MG/1; MG/1; MG/1; MG/1
20 CAPSULE, EXTENDED RELEASE ORAL DAILY
Qty: 30 CAPSULE | Refills: 0 | Status: CANCELLED | OUTPATIENT
Start: 2024-12-04

## 2024-12-04 RX ORDER — DEXTROAMPHETAMINE SACCHARATE, AMPHETAMINE ASPARTATE, DEXTROAMPHETAMINE SULFATE AND AMPHETAMINE SULFATE 2.5; 2.5; 2.5; 2.5 MG/1; MG/1; MG/1; MG/1
10 TABLET ORAL DAILY
Qty: 30 TABLET | Refills: 0 | Status: SHIPPED | OUTPATIENT
Start: 2025-01-03 | End: 2025-02-02

## 2024-12-04 RX ORDER — DEXTROAMPHETAMINE SACCHARATE, AMPHETAMINE ASPARTATE MONOHYDRATE, DEXTROAMPHETAMINE SULFATE AND AMPHETAMINE SULFATE 2.5; 2.5; 2.5; 2.5 MG/1; MG/1; MG/1; MG/1
10 CAPSULE, EXTENDED RELEASE ORAL DAILY
Qty: 30 CAPSULE | Refills: 0 | Status: SHIPPED | OUTPATIENT
Start: 2025-02-02 | End: 2024-12-04

## 2024-12-04 RX ORDER — DEXTROAMPHETAMINE SACCHARATE, AMPHETAMINE ASPARTATE, DEXTROAMPHETAMINE SULFATE AND AMPHETAMINE SULFATE 2.5; 2.5; 2.5; 2.5 MG/1; MG/1; MG/1; MG/1
10 TABLET ORAL DAILY PRN
Qty: 30 TABLET | Refills: 0 | Status: CANCELLED | OUTPATIENT
Start: 2024-12-04

## 2024-12-04 RX ORDER — DEXTROAMPHETAMINE SACCHARATE, AMPHETAMINE ASPARTATE MONOHYDRATE, DEXTROAMPHETAMINE SULFATE AND AMPHETAMINE SULFATE 2.5; 2.5; 2.5; 2.5 MG/1; MG/1; MG/1; MG/1
10 CAPSULE, EXTENDED RELEASE ORAL DAILY
Qty: 30 CAPSULE | Refills: 0 | Status: SHIPPED | OUTPATIENT
Start: 2025-01-03 | End: 2024-12-04

## 2024-12-04 NOTE — TELEPHONE ENCOUNTER
I'm so sorry about that- please notify pt/pharmacy that I intended for the 10mg IR tablets (resent now) and cancel the 10mg XR capsules. She will still need the 20mg XR capsules on file though.

## 2024-12-04 NOTE — PATIENT INSTRUCTIONS
Schedule lab only visit for thyroid labs    [facility]: you can contact the  Representative at: [phone].       Counseling will call you to schedule

## 2024-12-04 NOTE — PROGRESS NOTES
Leila is a 49 year old who is being evaluated via a billable video visit.    How would you like to obtain your AVS? [employer]oomay  If the video visit is dropped, the invitation should be resent by: Text to cell phone: [phone]  Will anyone else be joining your video visit? No      Assessment & Plan     Attention deficit hyperactivity disorder (ADHD), predominantly inattentive type  Attention deficit hyperactivity disorder (ADHD), predominantly inattentive type  Overall improvement with 20mg XR Adderall on board and 10mg IR in the afternoons.  - PMDP reviewed, no concerns. Last filled 10/29 and 11/1.   - CSA signed March 2024 (see [employer] message 3/12/2024 with the attached signed company from 12/2023 letter )   - evisit or VV in 3 months   - 3 month fills sent for the 20mg XR and 10mIR Adderall    Situational anxiety  Grief  Chest pressure- atypical; suspected non cardiac  Reviewed risks of possible heart related causes causing her atypical chest pressure x 4 days in setting of substantial overwhelm/grief and anxiety. She denies exertional chest pain; she was holding hand to chest wall off and on during visit (very emotional visit due to nature of the stressors in her life right now that she was recounting with me). She understands the ER is where she can have appropriate evaluation to r/o cardiac cause with stat labs/EKG if sx worsen. CT calcium score was zero last Dec; lower risk profile in general for cardiac source.   - encouraged counseling; referral placed/accepted by pt  - hydrOXYzine HCl (ATARAX) 10 MG tablet; Take 1-3 tablets (10-30 mg) by mouth 3 times daily as needed for anxiety.  - can reach out if rx not effective for transition/trial of ativan 0.5mg next    Abnormal TSH  With current chest wall sx (suspected atypical/non cardiac) will have her update the thyroid labs sooner given subclinical findings previously in Sept.   - TSH; Future  - T4 free; Future  - T3 total; Future      Follow-up 3 months    The  "longitudinal plan of care for the diagnosis(es)/condition(s) as documented were addressed during this visit. Due to the added complexity in care, I will continue to support Leila in the subsequent management and with ongoing continuity of care.            Courtney Dee is a 49 year old, presenting for the following health issues:  Follow Up (meds)    History of Present Illness       Reason for visit:  Medication Refill    She eats 0-1 servings of fruits and vegetables daily.She consumes 1 sweetened beverage(s) daily.She exercises with enough effort to increase her heart rate 20 to 29 minutes per day.  She exercises with enough effort to increase her heart rate 4 days per week.   She is taking medications regularly.     ADHD: [facility] always out of her meds so takes some time between fills to get them but eventually does.    Taking 20mg Adderall XR  daily and 10mg IR in afternoons and finding this helpful for her work and staying focused now that she is traveling more and doing more presentations. 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    Physical Exam   GENERAL: alert and mild physical and emotional distress  EYES: Eyes grossly normal to inspection.  No discharge or erythema, or obvious scleral/conjunctival abnormalities.  RESP: No audible wheeze, cough, or visible cyanosis.    SKIN: Visible skin clear. No significant rash, abnormal pigmentation or lesions.  NEURO: Cranial nerves grossly intact.  Mentation and speech appropriate for age.  PSYCH: Appropriate affect, tone, and pace of words. Tearful, anxious. Talking quickly  she was holding hand to chest wall off and on during visit (very emotional visit due to nature of the stressors in her life right now that she was recounting with me)          Video-Visit Details    Type of service:  Video Visit   Originating Location (pt. Location): Home    Distant Location (provider location):  On-site  Platform used for Video Visit: Michael  Signed Electronically by: Amanda Lin MD

## 2024-12-04 NOTE — TELEPHONE ENCOUNTER
General Call    Contacts       Contact Date/Time Type Contact Phone/Fax    12/04/2024 10:30 AM CST Phone (Incoming) [facility] [address] [place] [address] [phone] [facility] (Pharmacy) [phone]          Reason for Call:  Adderall XR 10mg Capsule    What are your questions or concerns:  Pharmacy reporting that capsules were sent vs tablet (per previous prescriptions). Pharmacy wanting to confirm this was an intentional switch from tablets to capsules and not an error.     Routing to provider as OV note not complete yet.     Per pharmacist - can call back if this was intentional but would need new RX if this was supposed to be capsules.

## 2024-12-04 NOTE — TELEPHONE ENCOUNTER
RN called pharmacy and notified of new prescription sent. Pharmacy has received and already filled the correct prescription. Patient will  correct medication.

## 2024-12-11 ENCOUNTER — LAB (OUTPATIENT)
Dept: LAB | Facility: CLINIC | Age: 49
End: 2024-12-11
Payer: COMMERCIAL

## 2024-12-11 DIAGNOSIS — R79.89 ABNORMAL TSH: ICD-10-CM

## 2024-12-11 PROCEDURE — 84439 ASSAY OF FREE THYROXINE: CPT

## 2024-12-11 PROCEDURE — 84443 ASSAY THYROID STIM HORMONE: CPT

## 2024-12-11 PROCEDURE — 36415 COLL VENOUS BLD VENIPUNCTURE: CPT

## 2024-12-11 PROCEDURE — 84480 ASSAY TRIIODOTHYRONINE (T3): CPT

## 2024-12-12 LAB
T3 SERPL-MCNC: 117 NG/DL (ref 85–202)
T4 FREE SERPL-MCNC: 1.09 NG/DL (ref 0.9–1.7)
TSH SERPL DL<=0.005 MIU/L-ACNC: 0.56 UIU/ML (ref 0.3–4.2)

## 2025-01-14 ENCOUNTER — TELEPHONE (OUTPATIENT)
Dept: VASCULAR SURGERY | Facility: CLINIC | Age: 50
End: 2025-01-14

## 2025-01-14 NOTE — TELEPHONE ENCOUNTER
FYI - Status Update    Who is Calling: patient    Update: patient woke up not feeling well with cold like symptoms wanting to know if it is still ok to come in and she wears a mask or would she need to reschedule,     Does caller want a call/response back: Yes     Could we send this information to you in [facility] or would you prefer to receive a phone call?:   Patient would prefer a phone call   Okay to leave a detailed message?: Yes at Cell number on file:    Telephone Information:   Mobile [phone]

## 2025-01-14 NOTE — TELEPHONE ENCOUNTER
Spoke with pt and she is also has sore throat, body aches, and fever. Pt r/s to 1/23/25 in [place].

## 2025-01-28 ENCOUNTER — MYC REFILL (OUTPATIENT)
Dept: FAMILY MEDICINE | Facility: CLINIC | Age: 50
End: 2025-01-28
Payer: COMMERCIAL

## 2025-01-28 DIAGNOSIS — Z86.19 HISTORY OF COLD SORES: ICD-10-CM

## 2025-01-28 RX ORDER — VALACYCLOVIR HYDROCHLORIDE 500 MG/1
TABLET, FILM COATED ORAL
Qty: 60 TABLET | Refills: 2 | Status: SHIPPED | OUTPATIENT
Start: 2025-01-28

## 2025-02-11 ENCOUNTER — OFFICE VISIT (OUTPATIENT)
Dept: PODIATRY | Facility: CLINIC | Age: 50
End: 2025-02-11
Payer: COMMERCIAL

## 2025-02-11 ENCOUNTER — ANCILLARY PROCEDURE (OUTPATIENT)
Dept: GENERAL RADIOLOGY | Facility: CLINIC | Age: 50
End: 2025-02-11
Attending: PODIATRIST
Payer: COMMERCIAL

## 2025-02-11 VITALS — BODY MASS INDEX: 28.95 KG/M2 | HEIGHT: 63 IN | WEIGHT: 163.4 LBS

## 2025-02-11 DIAGNOSIS — S93.492A HIGH ANKLE SPRAIN, LEFT, INITIAL ENCOUNTER: ICD-10-CM

## 2025-02-11 DIAGNOSIS — S93.492A HIGH ANKLE SPRAIN, LEFT, INITIAL ENCOUNTER: Primary | ICD-10-CM

## 2025-02-11 PROCEDURE — 99204 OFFICE O/P NEW MOD 45 MIN: CPT | Performed by: PODIATRIST

## 2025-02-11 NOTE — PROGRESS NOTES
FOOT AND ANKLE SURGERY/PODIATRY CONSULT NOTE         ASSESSMENT: Ankle sprain left      TREATMENT:  -I discussed with the patient that on exam today she has diffuse pain about the left ankle including along the deltoid ligament, anterior ankle joint and along the lateral collateral ligaments including the peroneal tendons.    -Based on the above, we discussed that advanced imaging in the form of an MRI would be useful to further evaluate potential soft tissue injuries.  I referred the patient for MRI of the left ankle.  Also referred for x-ray left ankle.    -Discussed conservative treatment options for ongoing left ankle pain including anti-inflammatory medication, physical therapy, ankle brace.    -I will contact the patient with the MRI report when available and we will be guided by the results.     -Patient's questions invited and answered. She was encouraged to call my office with any further questions or concerns.     Jarod Arce DPM  [facility] Podiat[facility]/Foot & Ankle Surgery      HPI: I was asked to see Nora Jay today for left ankle pain.  Patient reports that she injured the ankle in 2014 and underwent surgery at [facility] with Dr. Jin again in 2014.  This repair did improve her symptoms but she reinjured the ankle in 2019.  Since 2019 she has had continued discomfort in the left ankle.  She did return to [facility] and had repeat MRI which did not show any type of pathology.  She was referred to physical therapy and has had a steroid injection which did not relieve her symptoms.  She now reports having pain along the left knee and left hip.    Past Medical History:   Diagnosis Date    Acute neck pain     radiating down right arm    Fibroids          Social History     Socioeconomic History    Marital status: Single     Spouse name: Not on file    Number of children: 2    Years of education: Not on file    Highest education level: Not on file   Occupational History  "   Not on file   Tobacco Use    Smoking status: Former     Current packs/day: 0.00     Types: Cigarettes     Quit date: 2018     Years since quittin.0     Passive exposure: Never    Smokeless tobacco: Never   Vaping Use    Vaping status: Never Used   Substance and Sexual Activity    Alcohol use: No    Drug use: No    Sexual activity: Yes     Partners: Male     Birth control/protection: Surgical     Comment: hysterectomy   Other Topics Concern    Not on file   Social History Narrative    Lives with her dog. She is a single parent.   Daughter Khadra 22yo and her 17yo son just is off to college.   Works in cyber security.   Also coaches gymnastics in the evenings.   Walks 6lb Yorkie dog \"Diesel\" for  exercise. Otherwise walks or runs 3-5 miles a couple times per week. Former smoker. No alcohol.   Amanda Lin MD       Social Drivers of Health     Financial Resource Strain: Low Risk  (2024)    Financial Resource Strain     Within the past 12 months, have you or your family members you live with been unable to get utilities (heat, electricity) when it was really needed?: No   Food Insecurity: Low Risk  (2024)    Food Insecurity     Within the past 12 months, did you worry that your food would run out before you got money to buy more?: No     Within the past 12 months, did the food you bought just not last and you didn t have money to get more?: No   Transportation Needs: Low Risk  (2024)    Transportation Needs     Within the past 12 months, has lack of transportation kept you from medical appointments, getting your medicines, non-medical meetings or appointments, work, or from getting things that you need?: No   Physical Activity: Insufficiently Active (2024)    Exercise Vital Sign     Days of Exercise per Week: 4 days     Minutes of Exercise per Session: 30 min   Stress: Stress Concern Present (2024)    [place] citizen [place] of Occupational [employer] - Occupational Stress Questionnaire     " Feeling of Stress : To some extent   Social Connections: Unknown (9/9/2024)    Social Connection and Isolation Panel [NHANES]     Frequency of Communication with Friends and Family: Not on file     Frequency of Social Gatherings with Friends and Family: Once a week     Attends Taoist Services: Not on file     Active Member of Clubs or Organizations: Not on file     Attends Club or Organization Meetings: Not on file     Marital Status: Not on file   Interpersonal Safety: Low Risk  (9/9/2024)    Interpersonal Safety     Do you feel physically and emotionally safe where you currently live?: Yes     Within the past 12 months, have you been hit, slapped, kicked or otherwise physically hurt by someone?: No     Within the past 12 months, have you been humiliated or emotionally abused in other ways by your partner or ex-partner?: No   Housing Stability: Low Risk  (9/9/2024)    Housing Stability     Do you have housing? : Yes     Are you worried about losing your housing?: No            Allergies   Allergen Reactions    Venom-Honey Bee [Bee Venom] Anaphylaxis    Gabapentin Dizziness     Lightheaded, dizzy    Penicillins Hives    Iohexol Rash     Post 10/29/15 CT IV contrast injection pt. Noted rash on stomach at home.  Unknown to what caused this/thought possible IV contrast reaction.         MEDICATIONS:   Current Outpatient Medications   Medication Sig Dispense Refill    amphetamine-dextroamphetamine (ADDERALL XR) 20 MG 24 hr capsule Take 1 capsule (20 mg) by mouth daily. 30 capsule 0    amphetamine-dextroamphetamine (ADDERALL XR) 20 MG 24 hr capsule Take 1 capsule (20 mg) by mouth daily. 30 capsule 0    amphetamine-dextroamphetamine (ADDERALL) 10 MG tablet Take 1 tablet (10 mg) by mouth daily. 30 tablet 0    amphetamine-dextroamphetamine (ADDERALL) 10 MG tablet Take 1 tablet (10 mg) by mouth daily at 2 pm. 30 tablet 0    amphetamine-dextroamphetamine (ADDERALL) 10 MG tablet Take 1 tablet (10 mg) by mouth daily as  "needed (for concentration) Take 1 tablet (10mg) by mouth daily in the afternoon as needed for concentration. 30 tablet 0    augmented betamethasone dipropionate (DIPROLENE AF) 0.05 % external cream APPLY CREAM TOPICALLY TO DERMATITIS ON HANDS AND KNEE TWICE DAILY AS NEEDED      cyclobenzaprine (FLEXERIL) 10 MG tablet Take 1 tablet (10 mg) by mouth nightly as needed for muscle spasms 30 tablet 3    EPINEPHrine (ANY BX GENERIC EQUIV) 0.3 MG/0.3ML injection 2-pack Inject 0.3 mLs (0.3 mg) into the muscle as needed for anaphylaxis May repeat one time in 5-15 minutes if response to initial dose is inadequate. 2 each 1    hydrOXYzine HCl (ATARAX) 10 MG tablet Take 1-3 tablets (10-30 mg) by mouth 3 times daily as needed for anxiety. 90 tablet 2    ketoconazole (NIZORAL) 2 % external shampoo APPLY 1 APPLICATION EVERY OTHER DAY. WASH SCALP 2 TO 3 TIMES A WEEK.      minoxidil (LONITEN) 2.5 MG tablet Take 2.5 mg by mouth daily 1/2 TAB DAILY      propranolol (INDERAL) 20 MG tablet Take 1-2 tablets (20-40 mg) by mouth 2 times daily as needed (for anxiety prior to work presentations or social events) 30 tablet 1    valACYclovir (VALTREX) 500 MG tablet [VALACYCLOVIR (VALTREX) 500 MG TABLET] TAKE 2 TABLETS BY MOUTH TWICE DAILY FOR 5 DAYS for flares; can take 1 tablet by mouth for prevention 60 tablet 2     No current facility-administered medications for this visit.        Family History   Problem Relation Age of Onset    Hypertension Father     Diabetes Father     Colon Cancer Maternal Grandmother     Heart Disease Other     Breast Cancer No family hx of     Ovarian Cancer No family hx of           Review of Systems - 10 point Review of Systems is negative except for left ankle pain which is noted in HPI.    OBJECTIVE:  Appearance: alert, well appearing, and in no distress.    VITAL SIGNS: Ht 1.6 m (5' 3\")   Wt 74.1 kg (163 lb 6.4 oz)   BMI 28.95 kg/m        General appearance: Patient is alert and fully cooperative with history " & exam.  No sign of distress is noted during the visit.     Psychiatric: Affect is pleasant & appropriate.  Patient appears motivated to improve health.     Respiratory: Breathing is regular & unlabored while sitting.     HEENT: Hearing is intact to spoken word.  Speech is clear.  No gross evidence of visual impairment that would impact ambulation.      Vascular: Dorsalis pedis and posterior tibial pulses are palpable. There is pedal hair growth left.  CFT < 3 sec from anterior tibial surface to distal digits left. There is no appreciable edema noted.  Dermatologic: Turgor and texture are within normal limits. No coloration or temperature changes. No primary or secondary lesions noted.  Neurologic: All epicritic and proprioceptive sensations are grossly intact left.  Musculoskeletal: Diffuse pain along the left ankle including medial/anterior and lateral collateral ligaments left ankle.  Full range of motion left ankle and subtalar joint without discomfort.  Pain along course of peroneal tendons distal to lateral malleolus left.  No crepitus identified.  No pain along Achilles tendon left.

## 2025-03-05 ENCOUNTER — VIRTUAL VISIT (OUTPATIENT)
Dept: FAMILY MEDICINE | Facility: CLINIC | Age: 50
End: 2025-03-05
Payer: COMMERCIAL

## 2025-03-05 DIAGNOSIS — G89.29 CHRONIC PAIN OF LEFT ANKLE: ICD-10-CM

## 2025-03-05 DIAGNOSIS — M25.572 CHRONIC PAIN OF LEFT ANKLE: ICD-10-CM

## 2025-03-05 DIAGNOSIS — F90.0 ATTENTION DEFICIT HYPERACTIVITY DISORDER (ADHD), PREDOMINANTLY INATTENTIVE TYPE: Primary | ICD-10-CM

## 2025-03-05 DIAGNOSIS — M25.472 LEFT ANKLE SWELLING: ICD-10-CM

## 2025-03-05 DIAGNOSIS — M79.18 MYOFASCIAL PAIN: ICD-10-CM

## 2025-03-05 DIAGNOSIS — F40.243 FEAR OF FLYING: ICD-10-CM

## 2025-03-05 DIAGNOSIS — F41.8 SITUATIONAL ANXIETY: ICD-10-CM

## 2025-03-05 DIAGNOSIS — F43.21 GRIEF: ICD-10-CM

## 2025-03-05 PROCEDURE — 98014 SYNCH AUDIO-ONLY EST MOD 30: CPT | Performed by: FAMILY MEDICINE

## 2025-03-05 RX ORDER — DEXTROAMPHETAMINE SACCHARATE, AMPHETAMINE ASPARTATE MONOHYDRATE, DEXTROAMPHETAMINE SULFATE AND AMPHETAMINE SULFATE 5; 5; 5; 5 MG/1; MG/1; MG/1; MG/1
20 CAPSULE, EXTENDED RELEASE ORAL DAILY
Qty: 30 CAPSULE | Refills: 0 | Status: SHIPPED | OUTPATIENT
Start: 2025-05-04 | End: 2025-06-03

## 2025-03-05 RX ORDER — DEXTROAMPHETAMINE SACCHARATE, AMPHETAMINE ASPARTATE, DEXTROAMPHETAMINE SULFATE AND AMPHETAMINE SULFATE 2.5; 2.5; 2.5; 2.5 MG/1; MG/1; MG/1; MG/1
10 TABLET ORAL DAILY
Qty: 30 TABLET | Refills: 0 | Status: SHIPPED | OUTPATIENT
Start: 2025-04-04 | End: 2025-05-04

## 2025-03-05 RX ORDER — DEXTROAMPHETAMINE SACCHARATE, AMPHETAMINE ASPARTATE, DEXTROAMPHETAMINE SULFATE AND AMPHETAMINE SULFATE 2.5; 2.5; 2.5; 2.5 MG/1; MG/1; MG/1; MG/1
10 TABLET ORAL
Qty: 30 TABLET | Refills: 0 | Status: CANCELLED | OUTPATIENT
Start: 2025-03-05

## 2025-03-05 RX ORDER — LORAZEPAM 0.5 MG/1
TABLET ORAL
Qty: 8 TABLET | Refills: 0 | Status: SHIPPED | OUTPATIENT
Start: 2025-03-05

## 2025-03-05 RX ORDER — CYCLOBENZAPRINE HCL 10 MG
10 TABLET ORAL 2 TIMES DAILY PRN
Qty: 30 TABLET | Refills: 3 | Status: SHIPPED | OUTPATIENT
Start: 2025-03-05

## 2025-03-05 RX ORDER — DEXTROAMPHETAMINE SACCHARATE, AMPHETAMINE ASPARTATE, DEXTROAMPHETAMINE SULFATE AND AMPHETAMINE SULFATE 2.5; 2.5; 2.5; 2.5 MG/1; MG/1; MG/1; MG/1
10 TABLET ORAL DAILY
Qty: 30 TABLET | Refills: 0 | Status: SHIPPED | OUTPATIENT
Start: 2025-03-05 | End: 2025-04-04

## 2025-03-05 RX ORDER — NAPROXEN 500 MG/1
500 TABLET ORAL 2 TIMES DAILY WITH MEALS
Qty: 45 TABLET | Refills: 1 | Status: SHIPPED | OUTPATIENT
Start: 2025-03-05

## 2025-03-05 RX ORDER — DEXTROAMPHETAMINE SACCHARATE, AMPHETAMINE ASPARTATE MONOHYDRATE, DEXTROAMPHETAMINE SULFATE AND AMPHETAMINE SULFATE 5; 5; 5; 5 MG/1; MG/1; MG/1; MG/1
20 CAPSULE, EXTENDED RELEASE ORAL DAILY
Qty: 30 CAPSULE | Refills: 0 | Status: SHIPPED | OUTPATIENT
Start: 2025-03-05 | End: 2025-04-04

## 2025-03-05 RX ORDER — DEXTROAMPHETAMINE SACCHARATE, AMPHETAMINE ASPARTATE, DEXTROAMPHETAMINE SULFATE AND AMPHETAMINE SULFATE 2.5; 2.5; 2.5; 2.5 MG/1; MG/1; MG/1; MG/1
10 TABLET ORAL DAILY
Qty: 30 TABLET | Refills: 0 | Status: SHIPPED | OUTPATIENT
Start: 2025-05-04 | End: 2025-06-03

## 2025-03-05 RX ORDER — DEXTROAMPHETAMINE SACCHARATE, AMPHETAMINE ASPARTATE MONOHYDRATE, DEXTROAMPHETAMINE SULFATE AND AMPHETAMINE SULFATE 5; 5; 5; 5 MG/1; MG/1; MG/1; MG/1
20 CAPSULE, EXTENDED RELEASE ORAL DAILY
Qty: 30 CAPSULE | Refills: 0 | Status: SHIPPED | OUTPATIENT
Start: 2025-04-04 | End: 2025-05-04

## 2025-03-05 NOTE — PROGRESS NOTES
To Whom it may concern:    Pauline is on a strict fluid restriction of 1500ml. She will be provided a water bottle from home. This water bottle can be refilled at school from the water provided by her parents. If it is refilled it must be to the top and her parent must be notified.   Additionally Pauline is not allowed to drink from the water fountain.  Pauline can not go outside when it is 85 degrees or above. She also can not go outside if the dew point is greater than 60.     Please feel free to contact our office with any questions at [phone].    Dr Hua Burnett RN     Signatures   Electronically signed by : Paige Burnett R.N.; Aug 17 2018  5:59PM CST     "Leila is a 49 year old who is being evaluated via a billable telephone visit.    What phone number would you like to be contacted at? Home phone  How would you like to obtain your AVS? [employer]  Originating Location (pt. Location): Home    Distant Location (provider location):  On-site  Telephone visit completed due to the patient did not have access to video, while the distant provider did.    Assessment & Plan     Attention deficit hyperactivity disorder (ADHD), predominantly inattentive type  Overall improvement with 20mg XR Adderall on board and 10mg IR in the afternoons.  - PMDP reviewed, no concerns. Last filled 10/29 and 11/1.   - CSA signed March 2024 (see [employer] message 3/12/2024 with the attached signed company from 12/2023 letter )   - evisit or VV in 3 months   - 3 month fills sent for the 20mg XR and 10mIR Adderall    Neck pain/spasm  Refill sent for the flexeril      Situational anxiety  Grief  Planning to see her family in [facility] and looking forward to this      Follow-up in June as a Virtual appt      BMI  Estimated body mass index is 28.95 kg/m  as calculated from the following:    Height as of 2/11/25: 1.6 m (5' 3\").    Weight as of 2/11/25: 74.1 kg (163 lb 6.4 oz).             Subjective   Leila is a 49 year old, presenting for the following health issues:    CC: Med check      History of Present Illness       Reason for visit:  Medication    She eats 0-1 servings of fruits and vegetables daily.She consumes 1 sweetened beverage(s) daily.She exercises with enough effort to increase her heart rate 20 to 29 minutes per day.  She exercises with enough effort to increase her heart rate 4 days per week.   She is taking medications regularly.        ADHD: [facility] always out of her meds so takes some time between fills to get them but eventually does.     Taking 20mg Adderall XR  daily and 10mg IR in afternoons and finding this helpful for her work and staying focused now that she is traveling more " and doing more presentations. (Prn propranolol also helpful for speech anxiety)        Adderall due for a refill today    She states that her family is going to have a ceremony for her grandmother in [place] and they are thinking about going now.     She is turning 50 this month and she is excited to see the cherry blossoms there in the spring!    Leaves on 3/19/25 for this trip    She is worried about how her neck will do on this vacation/travel as it's been flaring up again   She hasn't been taking advil ever since the surgery. Has been using tylenol    She does have some flight anxiety and ativan helped in the past and would like this filled.         She has a foot MRI scheduled in near future for her 5 years of foot pain s/p surgical repair  She tried an ankle brace and compression stockings    She is worried about the walking in [place]                 Objective           Vitals:  No vitals were obtained today due to virtual visit.    Physical Exam   General: Alert and no distress //Respiratory: No audible wheeze, cough, or shortness of breath // Psychiatric:  Appropriate affect, tone, and pace of words            Phone call duration: 25 minutes  Signed Electronically by: Amanda Lin MD

## 2025-03-08 ENCOUNTER — HOSPITAL ENCOUNTER (OUTPATIENT)
Dept: MRI IMAGING | Facility: CLINIC | Age: 50
Discharge: HOME | End: 2025-03-08
Attending: PODIATRIST | Admitting: PODIATRIST
Payer: COMMERCIAL

## 2025-03-08 DIAGNOSIS — S93.492A HIGH ANKLE SPRAIN, LEFT, INITIAL ENCOUNTER: ICD-10-CM

## 2025-03-08 PROCEDURE — 73721 MRI JNT OF LWR EXTRE W/O DYE: CPT | Mod: LT

## 2025-03-11 ENCOUNTER — VIRTUAL VISIT (OUTPATIENT)
Dept: PODIATRY | Facility: CLINIC | Age: 50
End: 2025-03-11
Payer: COMMERCIAL

## 2025-03-11 VITALS — BODY MASS INDEX: 28.35 KG/M2 | HEIGHT: 63 IN | WEIGHT: 160 LBS

## 2025-03-11 DIAGNOSIS — S93.492A HIGH ANKLE SPRAIN, LEFT, INITIAL ENCOUNTER: Primary | ICD-10-CM

## 2025-03-11 RX ORDER — PREDNISONE 10 MG/1
TABLET ORAL
Qty: 30 TABLET | Refills: 0 | Status: SHIPPED | OUTPATIENT
Start: 2025-03-11

## 2025-03-11 ASSESSMENT — PAIN SCALES - GENERAL: PAINLEVEL_OUTOF10: MODERATE PAIN (4)

## 2025-03-11 NOTE — PATIENT INSTRUCTIONS
Dr. Arce would like you to call Av Dotsno M.D. to schedule an appointment.    [phone]    You may get an ankle brace and CAM boot at any of the locations below  Office Locations    [facility]  [address] [place] [address]  Home Medical Equipment, [address]   Phone: [phone]   Orthotics and Prosthetics, [address]   Phone: [phone]    [facility]   Home Medical Equipment   [address], [place] [address]  Phone :[phone]  Orthotics and Prosthetics   [address], [address], [facility] [address]  Phone:[phone]          [facility]  [address]  [address]   [place] [address]   Phone: [phone]    [facility]ld[facility]  [address]  [place] [address]  Phone: [phone]    [facility]  [address]  [place] [address]  Phone: [phone]  Fax: [phone]    [facility]   [address]  [place] [address]  Phone: [phone]    [facility]  [address]  [place] [address]  Phone: [phone]    [facility]  [address]  [address]  [place] [address]  Phone: [phone]    [place]   [address] [place] [address]   Phone: [phone]        WHAT YOU NEED TO KNOW:  What is a walking boot?  A walking boot is a type of medical shoe used to protect the foot and ankle after an injury or surgery. The boot can be used for broken bones, tendon injuries, severe sprains, or shin splints. A walking boot helps keep the foot stable so it can heal. It can keep your weight off an area, such as your toe, as it heals. Most boots have between 2 and 5 adjustable straps and go mid-way up  your calf.              How do I put on the walking boot?  You may want to wear a large sock.  Sit down and place your heel all the way to the back of the boot.  Wrap the soft liner around your foot and leg.  Place the front piece over the liner.  Start to fasten the straps closest to your toes then move up your leg.  Tighten the straps so they are snug but not too tight. The boot should limit movement but not cut off your blood flow.  If your boot has one or more air chambers, pump them up until ankle feels a little pressure and is secure.  Stand up and take a few steps to practice walking.    What else do I need to know?  Check your foot and toes often. Check your foot and toes for redness and swelling. If your toes are red, swollen, numb, or tingly, loosen your straps or deflate the air chamber. Over time, the swelling from the injury or surgery will decrease. When this happens, you may need to tighten the straps.  Be careful when you walk on wet surfaces. The boot may be slippery.  Follow the instructions to wash the liner. Remove the liner and wash it by hand in cold water with a mild detergent. Do not use a washing machine or dryer. Place the liner flat to dry. Wash the plastic parts with a damp cloth and mild soap.  Ask about removing the boot to bathe or for motion exercises. You may need to leave the boot on when you bathe. Cover it with a plastic bag and tape the bag closed around your leg.   Prednisone tablets    Brand Names: Deltasone, Predone, Sterapred, Sterapred DS   What is this medicine?  PREDNISONE (PRED ni sone) is a corticosteroid. It is commonly used to treat inflammation of the skin, joints, lungs, and other organs. Common conditions treated include asthma, allergies, and arthritis. It is also used for other conditions, such as blood disorders and diseases of the adrenal glands.  How should I use this medicine?  Take this medicine by mouth with a glass of water. Follow the directions on the  prescription label. Take this medicine with food. If you are taking this medicine once a day, take it in the morning. Do not take more medicine than you are told to take. Do not suddenly stop taking your medicine because you may develop a severe reaction. Your doctor will tell you how much medicine to take. If your doctor wants you to stop the medicine, the dose may be slowly lowered over time to avoid any side effects.  Talk to your pediatrician regarding the use of this medicine in children. Special care may be needed.  What side effects may I notice from receiving this medicine?  Side effects that you should report to your doctor or health care professional as soon as possible:  allergic reactions like skin rash, itching or hives, swelling of the face, lips, or tongue  changes in emotions or moods  changes in vision  depressed mood  eye pain  fever or chills, cough, sore throat, pain or difficulty passing urine  increased thirst  swelling of ankles, feet  Side effects that usually do not require medical attention (report to your doctor or health care professional if they continue or are bothersome):  confusion, excitement, restlessness  headache  nausea, vomiting  skin problems, acne, thin and shiny skin  trouble sleeping  weight gain  What may interact with this medicine?  Do not take this medicine with any of the following medications:  metyrapone  mifepristone  This medicine may also interact with the following medications:  aminoglutethimide  amphotericin B  aspirin and aspirin-like medicines  barbiturates  certain medicines for diabetes, like glipizide or glyburide  cholestyramine  cholinesterase inhibitors  cyclosporine  digoxin  diuretics  ephedrine  female hormones, like estrogens and birth control pills  isoniazid  ketoconazole  NSAIDS, medicines for pain and inflammation, like ibuprofen or naproxen  phenytoin  rifampin  toxoids  vaccines  warfarin  What if I miss a dose?  If you miss a dose, take it as  soon as you can. If it is almost time for your next dose, talk to your doctor or health care professional. You may need to miss a dose or take an extra dose. Do not take double or extra doses without advice.  Where should I keep my medicine?  Keep out of the reach of children.  Store at room temperature between 15 and 30 degrees C (59 and 86 degrees F). Protect from light. Keep container tightly closed. Throw away any unused medicine after the expiration date.  What should I tell my health care provider before I take this medicine?  They need to know if you have any of these conditions:  Cushing's syndrome  diabetes  glaucoma  heart disease  high blood pressure  infection (especially a virus infection such as chickenpox, cold sores, or herpes)  kidney disease  liver disease  mental illness  myasthenia gravis  osteoporosis  seizures  stomach or intestine problems  thyroid disease  an unusual or allergic reaction to lactose, prednisone, other medicines, foods, dyes, or preservatives  pregnant or trying to get pregnant  breast-feeding  What should I watch for while using this medicine?  Visit your doctor or health care professional for regular checks on your progress. If you are taking this medicine over a prolonged period, carry an identification card with your name and address, the type and dose of your medicine, and your doctor's name and address.  This medicine may increase your risk of getting an infection. Tell your doctor or health care professional if you are around anyone with measles or chickenpox, or if you develop sores or blisters that do not heal properly.  If you are going to have surgery, tell your doctor or health care professional that you have taken this medicine within the last twelve months.  Ask your doctor or health care professional about your diet. You may need to lower the amount of salt you eat.  This medicine may affect blood sugar levels. If you have diabetes, check with your doctor or  health care professional before you change your diet or the dose of your diabetic medicine.

## 2025-03-11 NOTE — LETTER
3/11/2025      Nora Jay  [address] [address]      Dear Colleague,    Thank you for referring your patient, Nora Jay, to the [facility]. Please see a copy of my visit note below.    Virtual Visit Details    Type of service:  Video Visit     Start visit: 10:12  End visit: 10:33    Originating Location (pt. Location): Home    Distant Location (provider location):  On-site  Platform used for Video Visit: [facility]    MRI left ankle: 1.  Postoperative changes of anterior talofibular ligament reconstruction. Mild thickening of the reconstructed ligament with surrounding periligamentous edema suggesting low grade sprain.  2.  Sequelae of remote medial and lateral ankle sprains with chronic irregularity/partial tearing of the calcaneofibular ligament and deep deltoid ligament.  3.  Minimal tendinopathy and mild tenosynovitis of the tibialis posterior tendon.  4.  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    -I reviewed the above MRI report with the patient today which indicates mild thickening of the ATF and partial tearing of the CFL ligaments along with irregularity along the deltoid ligament.  Chondral fissuring also noted.  No OCD identified.    -Based on the above, we discussed conservative treatment options including physical therapy with steroid injection.-Patient declines at this time.      -I have referred her for a cam boot and ankle brace for stability for her upcoming trip.      -I will start her on a 12-day taper course of oral prednisone.    -Also referred to Dr. Michael Dotson at [facility] for second opinion.     -All questions bided answered.  Patient to follow-up with me with any problems questions or concerns they develop.      Again, thank you for allowing me to participate in the care of your patient.        Sincerely,        Jarod Arce,  DPM    Electronically signed

## 2025-03-11 NOTE — PROGRESS NOTES
Virtual Visit Details    Type of service:  Video Visit     Start visit: 10:12  End visit: 10:33    Originating Location (pt. Location): Home    Distant Location (provider location):  On-site  Platform used for Video Visit: Michael    MRI left ankle: 1.  Postoperative changes of anterior talofibular ligament reconstruction. Mild thickening of the reconstructed ligament with surrounding periligamentous edema suggesting low grade sprain.  2.  Sequelae of remote medial and lateral ankle sprains with chronic irregularity/partial tearing of the calcaneofibular ligament and deep deltoid ligament.  3.  Minimal tendinopathy and mild tenosynovitis of the tibialis posterior tendon.  4.  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    -I reviewed the above MRI report with the patient today which indicates mild thickening of the ATF and partial tearing of the CFL ligaments along with irregularity along the deltoid ligament.  Chondral fissuring also noted.  No OCD identified.    -Based on the above, we discussed conservative treatment options including physical therapy with steroid injection.-Patient declines at this time.      -I have referred her for a cam boot and ankle brace for stability for her upcoming trip.      -I will start her on a 12-day taper course of oral prednisone.    -Also referred to Dr. Michael Dotson at [facility] for second opinion.     -All questions bided answered.  Patient to follow-up with me with any problems questions or concerns they develop.

## 2025-03-11 NOTE — NURSING NOTE
Current patient location: [address] [place] [address]    Is the patient currently in the state of [place]? YES    Visit mode: VIDEO    If the visit is dropped, the patient can be reconnected by: serena    Will anyone else be joining the visit? NO  (If patient encounters technical issues they should call [phone]    Are changes needed to the allergy or medication list? No    Are refills needed on medications prescribed by this physician? NO    Rooming Documentation:  Questionnaire(s) completed    Reason for visit: RECHECK    Shama CABRERA

## 2025-04-16 DIAGNOSIS — F40.10 SOCIAL PHOBIA INVOLVING FEAR OF PUBLIC SPEAKING: ICD-10-CM

## 2025-04-16 DIAGNOSIS — F40.10 SOCIAL ANXIETY DISORDER: ICD-10-CM

## 2025-04-16 RX ORDER — PROPRANOLOL HCL 20 MG
20-40 TABLET ORAL 2 TIMES DAILY PRN
Qty: 30 TABLET | Refills: 1 | Status: SHIPPED | OUTPATIENT
Start: 2025-04-16

## 2025-04-16 NOTE — TELEPHONE ENCOUNTER
Prescription approved per [facility] refill protocol.  Liza MORALEZ RN

## 2025-04-16 NOTE — TELEPHONE ENCOUNTER
Pending Prescriptions:                       Disp   Refills    propranolol (INDERAL) 20 MG tablet        30 tab*1            Sig: Take 1-2 tablets (20-40 mg) by mouth 2 times           daily as needed (for anxiety prior to work           presentations or social events).    Pharmacy:    [facility] [address] [place] [address] [address]

## 2025-05-19 ENCOUNTER — PATIENT OUTREACH (OUTPATIENT)
Dept: CARE COORDINATION | Facility: CLINIC | Age: 50
End: 2025-05-19
Payer: COMMERCIAL

## 2025-06-03 ENCOUNTER — VIRTUAL VISIT (OUTPATIENT)
Dept: FAMILY MEDICINE | Facility: CLINIC | Age: 50
End: 2025-06-03
Payer: COMMERCIAL

## 2025-06-03 ENCOUNTER — LAB (OUTPATIENT)
Dept: LAB | Facility: CLINIC | Age: 50
End: 2025-06-03
Payer: COMMERCIAL

## 2025-06-03 ENCOUNTER — ANCILLARY PROCEDURE (OUTPATIENT)
Dept: GENERAL RADIOLOGY | Facility: CLINIC | Age: 50
End: 2025-06-03
Attending: FAMILY MEDICINE
Payer: COMMERCIAL

## 2025-06-03 DIAGNOSIS — R11.2 NAUSEA AND VOMITING, UNSPECIFIED VOMITING TYPE: ICD-10-CM

## 2025-06-03 DIAGNOSIS — F90.0 ATTENTION DEFICIT HYPERACTIVITY DISORDER (ADHD), PREDOMINANTLY INATTENTIVE TYPE: ICD-10-CM

## 2025-06-03 DIAGNOSIS — R10.13 EPIGASTRIC PAIN: ICD-10-CM

## 2025-06-03 DIAGNOSIS — Z12.31 VISIT FOR SCREENING MAMMOGRAM: ICD-10-CM

## 2025-06-03 DIAGNOSIS — R10.13 EPIGASTRIC PAIN: Primary | ICD-10-CM

## 2025-06-03 LAB
ALBUMIN SERPL BCG-MCNC: 4.1 G/DL (ref 3.5–5.2)
ALP SERPL-CCNC: 100 U/L (ref 40–150)
ALT SERPL W P-5'-P-CCNC: 15 U/L (ref 0–50)
ANION GAP SERPL CALCULATED.3IONS-SCNC: 11 MMOL/L (ref 7–15)
AST SERPL W P-5'-P-CCNC: 20 U/L (ref 0–45)
BILIRUB SERPL-MCNC: 0.3 MG/DL
BUN SERPL-MCNC: 8.7 MG/DL (ref 6–20)
CALCIUM SERPL-MCNC: 9.1 MG/DL (ref 8.8–10.4)
CHLORIDE SERPL-SCNC: 106 MMOL/L (ref 98–107)
CREAT SERPL-MCNC: 0.81 MG/DL (ref 0.51–0.95)
EGFRCR SERPLBLD CKD-EPI 2021: 88 ML/MIN/1.73M2
ERYTHROCYTE [DISTWIDTH] IN BLOOD BY AUTOMATED COUNT: 12.1 % (ref 10–15)
GLUCOSE SERPL-MCNC: 114 MG/DL (ref 70–99)
HCO3 SERPL-SCNC: 22 MMOL/L (ref 22–29)
HCT VFR BLD AUTO: 41 % (ref 35–47)
HGB BLD-MCNC: 13.7 G/DL (ref 11.7–15.7)
LIPASE SERPL-CCNC: 33 U/L (ref 13–60)
MCH RBC QN AUTO: 29.3 PG (ref 26.5–33)
MCHC RBC AUTO-ENTMCNC: 33.4 G/DL (ref 31.5–36.5)
MCV RBC AUTO: 88 FL (ref 78–100)
PLATELET # BLD AUTO: 298 10E3/UL (ref 150–450)
POTASSIUM SERPL-SCNC: 4.1 MMOL/L (ref 3.4–5.3)
PROT SERPL-MCNC: 6.3 G/DL (ref 6.4–8.3)
RBC # BLD AUTO: 4.68 10E6/UL (ref 3.8–5.2)
SODIUM SERPL-SCNC: 139 MMOL/L (ref 135–145)
WBC # BLD AUTO: 8.3 10E3/UL (ref 4–11)

## 2025-06-03 PROCEDURE — 36415 COLL VENOUS BLD VENIPUNCTURE: CPT

## 2025-06-03 PROCEDURE — 98014 SYNCH AUDIO-ONLY EST MOD 30: CPT | Performed by: FAMILY MEDICINE

## 2025-06-03 PROCEDURE — 83690 ASSAY OF LIPASE: CPT

## 2025-06-03 PROCEDURE — 74019 RADEX ABDOMEN 2 VIEWS: CPT | Mod: TC | Performed by: RADIOLOGY

## 2025-06-03 PROCEDURE — 80053 COMPREHEN METABOLIC PANEL: CPT

## 2025-06-03 PROCEDURE — 85027 COMPLETE CBC AUTOMATED: CPT

## 2025-06-03 RX ORDER — DEXTROAMPHETAMINE SACCHARATE, AMPHETAMINE ASPARTATE, DEXTROAMPHETAMINE SULFATE AND AMPHETAMINE SULFATE 2.5; 2.5; 2.5; 2.5 MG/1; MG/1; MG/1; MG/1
10 TABLET ORAL DAILY
Qty: 30 TABLET | Refills: 0 | Status: SHIPPED | OUTPATIENT
Start: 2025-08-02 | End: 2025-09-01

## 2025-06-03 RX ORDER — DEXTROAMPHETAMINE SACCHARATE, AMPHETAMINE ASPARTATE MONOHYDRATE, DEXTROAMPHETAMINE SULFATE AND AMPHETAMINE SULFATE 5; 5; 5; 5 MG/1; MG/1; MG/1; MG/1
20 CAPSULE, EXTENDED RELEASE ORAL DAILY
Qty: 30 CAPSULE | Refills: 0 | Status: SHIPPED | OUTPATIENT
Start: 2025-07-03 | End: 2025-08-02

## 2025-06-03 RX ORDER — ONDANSETRON 4 MG/1
4 TABLET, ORALLY DISINTEGRATING ORAL EVERY 8 HOURS PRN
Qty: 8 TABLET | Refills: 1 | Status: SHIPPED | OUTPATIENT
Start: 2025-06-03

## 2025-06-03 RX ORDER — DEXTROAMPHETAMINE SACCHARATE, AMPHETAMINE ASPARTATE, DEXTROAMPHETAMINE SULFATE AND AMPHETAMINE SULFATE 2.5; 2.5; 2.5; 2.5 MG/1; MG/1; MG/1; MG/1
10 TABLET ORAL DAILY
Qty: 30 TABLET | Refills: 0 | Status: SHIPPED | OUTPATIENT
Start: 2025-06-03 | End: 2025-07-03

## 2025-06-03 RX ORDER — DEXTROAMPHETAMINE SACCHARATE, AMPHETAMINE ASPARTATE, DEXTROAMPHETAMINE SULFATE AND AMPHETAMINE SULFATE 2.5; 2.5; 2.5; 2.5 MG/1; MG/1; MG/1; MG/1
10 TABLET ORAL DAILY
Qty: 30 TABLET | Refills: 0 | Status: SHIPPED | OUTPATIENT
Start: 2025-07-03 | End: 2025-08-02

## 2025-06-03 RX ORDER — DEXTROAMPHETAMINE SACCHARATE, AMPHETAMINE ASPARTATE MONOHYDRATE, DEXTROAMPHETAMINE SULFATE AND AMPHETAMINE SULFATE 5; 5; 5; 5 MG/1; MG/1; MG/1; MG/1
20 CAPSULE, EXTENDED RELEASE ORAL DAILY
Qty: 30 CAPSULE | Refills: 0 | Status: SHIPPED | OUTPATIENT
Start: 2025-08-02 | End: 2025-09-01

## 2025-06-03 RX ORDER — DEXTROAMPHETAMINE SACCHARATE, AMPHETAMINE ASPARTATE MONOHYDRATE, DEXTROAMPHETAMINE SULFATE AND AMPHETAMINE SULFATE 5; 5; 5; 5 MG/1; MG/1; MG/1; MG/1
20 CAPSULE, EXTENDED RELEASE ORAL DAILY
Qty: 30 CAPSULE | Refills: 0 | Status: SHIPPED | OUTPATIENT
Start: 2025-06-03 | End: 2025-07-03

## 2025-06-03 NOTE — PROGRESS NOTES
"Leila is a 50 year old who is being evaluated via a billable telephone visit.    What phone number would you like to be contacted at? [phone]   How would you like to obtain your AVS? Gissel  Originating Location (pt. Location): Home    Distant Location (provider location):  On-site  Telephone visit completed due to the patient did not consent to a video visit.    Assessment & Plan     Attention deficit hyperactivity disorder (ADHD), predominantly inattentive type  ADHD is stable with current medication regimen.  - Continue Adderall extended release 20 mg once daily and 10 mg immediate release as needed.   - 3 month refills sent  - PDMP reviewed, no concerns  - Sign updated CSA at routine physical in September. (Last CSA signed 3/2024)  Follow ups every 3-6 months   For example:   Annual exam (scheduled in Sept)  Evisit @ 3 months  Video visit @ 6 months  Evisit @ 9 months    Epigastric pain with N/V   Possible causes include viral gastroenteritis, hiatal hernia, bowel obstruction, pancreatitis; vs less likely cardiac etiology  - Monitor symptoms and consider an X-ray and lab work to rule out bowel obstruction or pancreatitis.  - Ordered X-ray and labs placed on file.  - Consider ER visit if symptoms worsen as this was a phone visit and unable to perform exam  - Offer Zofran for nausea if needed, with caution due to potential constipation.    Visit for screening mammogram  - Ordered mammogram as it is due.      Consent was obtained from the patient to use an AI documentation tool in the creation of this note.          BMI  Estimated body mass index is 28.34 kg/m  as calculated from the following:    Height as of 3/11/25: 1.6 m (5' 3\").    Weight as of 3/11/25: 72.6 kg (160 lb).     The longitudinal plan of care for the diagnosis(es)/condition(s) as documented were addressed during this visit. Due to the added complexity in care, I will continue to support Leila in the subsequent management and with ongoing " continuity of care.    Follow-up IN Sept for annual exam/ sign updated CSA    Subjective   Leila is a 50 year old, presenting for the following health issues:  RECHECK (Sharp pains under breast bones X1 days, not able to keep food down)        6/3/2025     9:59 AM   Additional Questions   Roomed by Sydnie MANUEL MA     History of Present Illness       Reason for visit:  Medication    She eats 2-3 servings of fruits and vegetables daily.She consumes 1 sweetened beverage(s) daily.She exercises with enough effort to increase her heart rate 20 to 29 minutes per day.  She exercises with enough effort to increase her heart rate 4 days per week.   She is taking medications regularly.   Leila Jay, 50 years    Epigastric Pain and Vomiting  - Experienced severe, wave-like pain in the upper epigastric region, starting around 2:30 PM the previous day.  - Pain was intense enough to cause shortness of breath and required leaving a meeting to lie down.  - Experienced multiple episodes of vomiting, with difficulty keeping anything down, including water and popsicles (only food she really ate yesterday was a donut ).   - Consumed a mimosa at [employer] on Sunday, which is not typical for her. (Usually no alcohol intake)  - Noted an acidic burp when the pain first started, but no further acid reflux symptoms.  - No diarrhea reported, but had a normal bowel movement the previous morning. (Tends to have hx of constipation though)  - Described abdomen as feeling bloated but not hard.  - Experienced a low, dull headache and cold sweats, but no fever.  - Blood pressure was checked and found to be in the normal range (131/85).  - Son considered taking her to the hospital due to the severity of symptoms.  - Symptoms have persisted for less than 24 hours, with some improvement noted.      ADHD  - No recent changes in Adderall medication, which has been stable and effective. Medication details: Adderall 20 mg extended release once daily and 10 mg  "immediate release as needed.              Objective    Vitals - Patient Reported  Systolic (Patient Reported): 131  Diastolic (Patient Reported): 85  Weight (Patient Reported): 72.6 kg (160 lb)  Height (Patient Reported): 160 cm (5' 3\")  BMI (Based on Pt Reported Ht/Wt): 28.34        Physical Exam   General: Alert and mild distress from discomfort of vomiting earlier today, epigastric discomfort but pt indicated belly is soft//Respiratory: No audible wheeze, cough, or shortness of breath // Psychiatric:  Appropriate affect, tone, and pace of words            Phone call duration: 30 minutes  Signed Electronically by: Amadna Lin MD    "

## 2025-06-04 ENCOUNTER — RESULTS FOLLOW-UP (OUTPATIENT)
Dept: FAMILY MEDICINE | Facility: CLINIC | Age: 50
End: 2025-06-04

## 2025-06-04 ENCOUNTER — PATIENT OUTREACH (OUTPATIENT)
Dept: CARE COORDINATION | Facility: CLINIC | Age: 50
End: 2025-06-04
Payer: COMMERCIAL

## 2025-06-04 ENCOUNTER — MYC MEDICAL ADVICE (OUTPATIENT)
Dept: FAMILY MEDICINE | Facility: CLINIC | Age: 50
End: 2025-06-04
Payer: COMMERCIAL

## 2025-06-17 ENCOUNTER — VIRTUAL VISIT (OUTPATIENT)
Dept: FAMILY MEDICINE | Facility: CLINIC | Age: 50
End: 2025-06-17
Payer: COMMERCIAL

## 2025-06-17 DIAGNOSIS — F40.240 CLAUSTROPHOBIA: ICD-10-CM

## 2025-06-17 DIAGNOSIS — F40.243 FEAR OF FLYING: ICD-10-CM

## 2025-06-17 DIAGNOSIS — R10.13 EPIGASTRIC PAIN: Primary | ICD-10-CM

## 2025-06-17 DIAGNOSIS — M25.472 LEFT ANKLE SWELLING: ICD-10-CM

## 2025-06-17 DIAGNOSIS — Z91.041 CONTRAST MEDIA ALLERGY: ICD-10-CM

## 2025-06-17 DIAGNOSIS — R11.2 NAUSEA AND VOMITING, UNSPECIFIED VOMITING TYPE: ICD-10-CM

## 2025-06-17 PROCEDURE — 98014 SYNCH AUDIO-ONLY EST MOD 30: CPT | Performed by: FAMILY MEDICINE

## 2025-06-17 RX ORDER — METHYLPREDNISOLONE 32 MG/1
32 TABLET ORAL DAILY
Qty: 1 TABLET | Refills: 0 | Status: SHIPPED | OUTPATIENT
Start: 2025-06-17

## 2025-06-17 RX ORDER — LORAZEPAM 0.5 MG/1
TABLET ORAL
Qty: 8 TABLET | Refills: 0 | Status: SHIPPED | OUTPATIENT
Start: 2025-06-17

## 2025-06-17 NOTE — PROGRESS NOTES
Leila is a 50 year old who is being evaluated via a billable video visit.   (Pt converted to phone visit due to lack of access to video currently)  What phone number would you like to be contacted at? [phone]   How would you like to obtain your AVS? MyChart      Assessment & Plan     Epigastric pain:  See prior notes for initial evaluation of acute onset stomach pain and some bloating/gas. Prominent stool throughout the colon noted on AXR from 10 days ago, with normal CMP, CBC, lipase and hx of constipation. Possible additional causes include stomach ulcer, PUD, or hiatal hernia. Family history of stomach cancer noted. She asked about possible blood work for screening for stomach cancer  including CEA or ; at this point will defer until further conversation with GI or if CT abdomen is abnormal given high risk for false positives; reviewed sometimes these labs are followed after dx for post surgical monitoring though.   - Referral to GI for further evaluation, including potential upper endoscopy.   - Orders placed for  abdominal CT scan.    Contrast Allergy  - History of allergic reaction to contrast after a CT scan following hysterectomy- had a minor rash she thinks  - Rx for methylprednisolone 32mg 12hr prior to procedure    Left ankle swelling:  Previous MRI showed damage. Referral to Dr. Michael Dotson at [facility] for a second opinion per Dr Arce's last podiatry note  - Ortho referral to Dr. Michael Dotson.    Fear of flying:  - Ativan prescribed for flight anxiety. #8 tabs; very effective on recent international trip    Claustrophobia:  - Ativan prescribed for claustrophobia during CT scan.    Constipation:  - Chronic constipation with episodes of severe pain.  - Constipation management regimen reviewed per AVS including increased fluids, fiber intake, and use of colace and miralax daily. Referral to GI for further evaluation.    Weight gain and menopause-related symptoms:  - Weight gain and increased  "cravings possibly related to menopause.  - Recommendations for dietary changes including increased protein intake and strength training. Medications not recommended due to GI issues at this time; can reassess in a couple months          BMI  Estimated body mass index is 28.34 kg/m  as calculated from the following:    Height as of 3/11/25: 1.6 m (5' 3\").    Weight as of 3/11/25: 72.6 kg (160 lb).   Weight management plan: Discussed healthy diet and exercise guidelines    34 minutes spent on the date of the encounter doing chart review, patient visit and documentation.          Courtney Dee is a 50 year old, presenting for the following health issues:  RECHECK        6/17/2025    10:51 AM   Additional Questions   Roomed by Sydnie MANUEL MA     History of Present Illness       Reason for visit:  Follow up to labs    She eats 2-3 servings of fruits and vegetables daily.She consumes 1 sweetened beverage(s) daily.She exercises with enough effort to increase her heart rate 10 to 19 minutes per day.  She exercises with enough effort to increase her heart rate 3 or less days per week.   She is taking medications regularly.   Leila Jay, age: 50 years    Epigastric Pain and Gastrointestinal Symptoms  - Experienced epigastric pain approximately 10 days ago  - History of irregular bowel movements, typically every 3 days  - Occasional constipation, but not always  - Episodes of excruciating pain followed by frequent bowel movements, occurring weekly for 2-3 weeks  - Reports of waking up with long, unusual burps without consumption of food or drink  - Difficulty eating large meals, prefers snacking due to stomach discomfort    Weight Gain and Menopausal Symptoms  - Recent weight gain without significant dietary changes  - Increased cravings for sweets  - Experiencing hot flashes    Ankle Issues  - Previous MRI showed definite damage to the ankle  - Wore a walking boot and ankle brace for support during travel and " "hiking        Family History Concerns  - Aunt passed away from stomach cancer in her early 50s                      Objective    Vitals - Patient Reported  Weight (Patient Reported): 72.6 kg (160 lb)  Height (Patient Reported): 160 cm (5' 3\")  BMI (Based on Pt Reported Ht/Wt): 28.34        Physical Exam   Not done due to pt changing to phone visit     Labs from 6/3/2025  CMP normal   Lipase normal   CBC normal      EXAM: XR ABDOMEN 2 VIEWS  LOCATION: [facility]  DATE: 6/3/2025     INDICATION: acute epigastric lower chest pain x 1 day; with emesis  COMPARISON: None.                                                                      IMPRESSION: Supine and upright KUB. Nonspecific bowel gas pattern. Scattered stool in the colon. Nothing for obstruction or free air. No evidence for renal stones.        Video-Visit Details    Type of service:  Video Visit   Originating Location (pt. Location): Home  Distant Location (provider location):  On-site  Platform used for Video Visit: Telephone  Signed Electronically by: Amanda Lin MD    "

## 2025-06-17 NOTE — PATIENT INSTRUCTIONS
You can call the radiology department at [phone] to schedule your imaging test that was ordered.        Options for constipation:    Increase fluids (60-80oz fluids/24hr)  Increase dietary fiber (25-35 grams/day). Grape Nuts or Raisin Bran cereals; Prunes, raisins or 4oz of prune/apple  juice daily are great options.  Increase movement (exercising/walking helps digestion)  Try Medication options available over the counter at your pharmacy:  Start medication version of fiber: metamucil/psyllium once daily in a large 8oz glass of water  Colace (stool softener): Take 1 or 2 times daily to keep stools soft  Miralax (laxative): Take 1 capful daily to maintain soft regular stools, and take up to 2-3 times daily for a few days,if constipation is worsening  Ольга-colace (Colace softener + Senna stimulant) once daily- this would be used INSTEAD of the plain colace if you need stimulant    Milk of Magnesium or magnesium citrate as needed every few days  Glycerin suppository as needed      Bowel Cleanse option if severe:  Mix 64 ounces of Gatorade with 8.3 ounces of MiraLAX.  Drink 1 cup every 15 minutes until finished.  After the cleanse, please start MiraLAX 1 capful daily.

## 2025-06-18 ENCOUNTER — PATIENT OUTREACH (OUTPATIENT)
Dept: CARE COORDINATION | Facility: CLINIC | Age: 50
End: 2025-06-18
Payer: COMMERCIAL

## 2025-07-02 ENCOUNTER — HOSPITAL ENCOUNTER (OUTPATIENT)
Dept: MAMMOGRAPHY | Facility: CLINIC | Age: 50
Discharge: HOME | End: 2025-07-02
Attending: FAMILY MEDICINE
Payer: COMMERCIAL

## 2025-07-02 DIAGNOSIS — Z12.31 VISIT FOR SCREENING MAMMOGRAM: ICD-10-CM

## 2025-07-02 PROCEDURE — 77063 BREAST TOMOSYNTHESIS BI: CPT

## 2025-07-21 ENCOUNTER — MYC REFILL (OUTPATIENT)
Dept: FAMILY MEDICINE | Facility: CLINIC | Age: 50
End: 2025-07-21
Payer: COMMERCIAL

## 2025-07-21 DIAGNOSIS — Z86.19 HISTORY OF COLD SORES: ICD-10-CM

## 2025-07-21 RX ORDER — VALACYCLOVIR HYDROCHLORIDE 500 MG/1
TABLET, FILM COATED ORAL
Qty: 60 TABLET | Refills: 0 | Status: SHIPPED | OUTPATIENT
Start: 2025-07-21

## 2025-07-25 ENCOUNTER — TRANSFERRED RECORDS (OUTPATIENT)
Dept: HEALTH INFORMATION MANAGEMENT | Facility: CLINIC | Age: 50
End: 2025-07-25
Payer: COMMERCIAL